# Patient Record
Sex: FEMALE | Race: WHITE | NOT HISPANIC OR LATINO | Employment: FULL TIME | URBAN - METROPOLITAN AREA
[De-identification: names, ages, dates, MRNs, and addresses within clinical notes are randomized per-mention and may not be internally consistent; named-entity substitution may affect disease eponyms.]

---

## 2017-02-12 ENCOUNTER — MYC REFILL (OUTPATIENT)
Dept: FAMILY MEDICINE | Facility: CLINIC | Age: 52
End: 2017-02-12

## 2017-02-12 ENCOUNTER — MYC REFILL (OUTPATIENT)
Dept: URGENT CARE | Facility: URGENT CARE | Age: 52
End: 2017-02-12

## 2017-02-12 DIAGNOSIS — F51.01 PRIMARY INSOMNIA: ICD-10-CM

## 2017-02-12 DIAGNOSIS — R45.89 PREDOMINANT DISTURBANCE OF EMOTIONS: ICD-10-CM

## 2017-02-13 ENCOUNTER — OFFICE VISIT (OUTPATIENT)
Dept: PODIATRY | Facility: CLINIC | Age: 52
End: 2017-02-13
Payer: COMMERCIAL

## 2017-02-13 VITALS — HEIGHT: 65 IN | WEIGHT: 154 LBS | BODY MASS INDEX: 25.66 KG/M2

## 2017-02-13 DIAGNOSIS — M72.2 PLANTAR FASCIITIS, LEFT: Primary | ICD-10-CM

## 2017-02-13 PROCEDURE — 20550 NJX 1 TENDON SHEATH/LIGAMENT: CPT | Mod: LT | Performed by: PODIATRIST

## 2017-02-13 NOTE — PATIENT INSTRUCTIONS
Initial musculoskeletal treatment recommendation:    1.  Stretch the calf muscles as instructed once an hour.  2.  Ice the injured area in the evening; 20 min on/off.  3.  Take antiinflammatory medication as directed.  4.  Massage the soft tissues around the injured area in the morning to loosen the tissue  5.  Wear supportive foot wear         If no improvement in symptoms within four to six weeks, return to clinic for reevaluation..    After the Injection     After the injection, strenuous and repetitive activity should be minimized for approximately 48 hours.   Ice should be applied to the injected area at least for the next 48 hours.   Apply ice to the injected area at least 3 - 4 times a day for 20 minutes each time for the next 48 hours. This can reduce the painful  flare  reaction that can follow an injection the next day. This reaction can cause the area that was injected to hurt more the next day just from the injection. This will resolve within a day if it does occur.     Use over-the-counter pain medications such as Tylenol to help with the pain if necessary.     After 48 hours, icing the area may be continued if you find it beneficial.     The lidocaine or marcaine (commonly called Novocain) is an anesthetic agent that is injected with the steroid will typically relieve your pain for a few hours following the injection. If the  Novocain  and steroid are injected near a nerve, you may experience local numbness or weakness from the nerve block until it wears off. After this wears off your pain may return until the steroid takes effect.   The steroid may be effective immediately after the injection. Do not be concerned if the injection is not effective in relieving your symptoms immediately. In some cases, it may take up to two weeks for the steroid to work.   If you are diabetic, the corticosteroid may cause your blood sugar to become elevated for several days following the injection. This response usually  lasts about 2-4 days before it returns to your normal level.   You should report any adverse reaction to you doctor. Call if there are any questions.

## 2017-02-13 NOTE — PROGRESS NOTES
PATIENT HISTORY:  Margot Kendrick is a 51 year old female who presents to clinic for a painful left foot .  The patient describes the pain as sharp stabbing.  The patient relates pain is located on the bottom of the left heel.  The patient relates the pain has been present for the past several weeks.  The patient relates pain with ambulation.  The patient has tried icing with little relief.       REVIEW OF SYSTEMS:  Constitutional, HEENT, cardiovascular, pulmonary, GI, , musculoskeletal, neuro, skin, endocrine and psych systems are negative, except as otherwise noted.     PAST MEDICAL HISTORY:   Past Medical History   Diagnosis Date     Arthritis      Esophageal reflux      GERD     Migraine, unspecified, without mention of intractable migraine without mention of status migrainosus      Mitral valve disorders      thought possible MVP, not documented     Myalgia and myositis, unspecified      Fibromyalgia        PAST SURGICAL HISTORY:   Past Surgical History   Procedure Laterality Date     Surgical history of -   ,,          Surgical history of -   1993     Excision (L) ganglion cyst     Surgical history of -   1993     Tubal ligation     Surgical history of -   1998     Septoplasty/sinus surgery     Surgical history of -        TVH     Surgical history of -   2004/ /15/2005     Colonoscopy     Surgical history of -   2001     Gastroscopy     Surgical history of -        Septoplasty     Surgical history of -        rt craig colectomy     Surgical history of -        Open distal clavicle resection with subacromial decompression     Surgical history of -   2008     rt wrist TFCC     Surgical history of -   2008     rt wrist removal of scar tissue     Surgical history of -   2009     Arthroscopic subacromial decompression with introduction of On-Q pain pump.      Ent surgery       Colonoscopy       Orthopedic surgery        Colonoscopy  6/11/2012     Procedure: COLONOSCOPY;  Colonoscopy;  Surgeon: Jun Reddy MD;  Location: [facility]     Hysterectomy, vaginal  2002     Abdomen surgery  2005     Right Sanjiv-Collectomy     Appendectomy  2005     With collectomy     Ent surgery  2000     Sinus     Orthopedic surgery  2006/2009/2010     Left shoulder/Right Shoulder/Right Wrist x 3     Esophagoscopy, gastroscopy, duodenoscopy (egd), combined N/A 10/8/2015     Procedure: COMBINED ESOPHAGOSCOPY, GASTROSCOPY, DUODENOSCOPY (EGD), BIOPSY SINGLE OR MULTIPLE;  Surgeon: Anson Sethi MD;  Location: [facility]     Laparoscopic cholecystectomy N/A 10/27/2015     Procedure: LAPAROSCOPIC CHOLECYSTECTOMY;  Surgeon: Anson Sethi MD;  Location: [facility]     Eye surgery  2015     Recurrent corneal erosion     Colonoscopy N/A 2/15/2016     Procedure: COLONOSCOPY;  Surgeon: Anson Sethi MD;  Location: [facility]        MEDICATIONS:   Current Outpatient Prescriptions:      temazepam (RESTORIL) 15 MG capsule, Take 1 capsule (15 mg) by mouth nightly as needed for sleep, Disp: 30 capsule, Rfl: 5     estradiol (ESTRACE VAGINAL) 0.1 MG/GM vaginal cream, Place 2 g vaginally twice a week, Disp: 42.5 g, Rfl: 3     aluminum chloride (DRYSOL) 20 % external solution, Apply topically At Bedtime To improve effect, cover area of application with plastic wrap,  hold in place with tight shirt, and wash area in morning. As sweating improves, decrease use to 1-2 times weekly., Disp: 35 mL, Rfl: 5     LORazepam (ATIVAN) 0.5 MG tablet, Take 1 tablet (0.5 mg) by mouth every 8 hours as needed for anxiety, Disp: 50 tablet, Rfl: 0     cyclobenzaprine (FLEXERIL) 10 MG tablet, Take 1 tablet (10 mg) by mouth nightly as needed for muscle spasms, Disp: 30 tablet, Rfl: 1     SUMAtriptan (IMITREX) 50 MG tablet, Take 1 tablet (50 mg) by mouth at onset of headache for migraine May repeat dose in 2 hours.  Do not exceed 200 mg in 24 hours, Disp: 18 tablet, Rfl: 3     naproxen sodium (ALEVE) 220  MG capsule, Take 220 mg by mouth 2 times daily (with meals), Disp: , Rfl:      traMADol (ULTRAM) 50 MG tablet, Take 1-2 at bedtime as needed for pain., Disp: 60 tablet, Rfl: 0     ADVIL 200 MG OR TABS, 2 tabs prn, Disp: , Rfl:      ALLERGIES:    Allergies   Allergen Reactions     Asa [Aspirin] Nausea     TINGLING  FINGERS, HEAD,         SOCIAL HISTORY:   Social History     Social History     Marital status:      Spouse name: N/A     Number of children: N/A     Years of education: N/A     Occupational History      [employer]     Social History Main Topics     Smoking status: Former Smoker     Packs/day: 0.50     Types: Cigarettes     Smokeless tobacco: Never Used      Comment: quit plan offered     Alcohol use No     Drug use: No     Sexual activity: Yes     Partners: Male     Birth control/ protection: Female Surgical      Comment: leeann 1998     Other Topics Concern     Parent/Sibling W/ Cabg, Mi Or Angioplasty Before 65f 55m? No     Social History Narrative        FAMILY HISTORY:   Family History   Problem Relation Age of Onset     C.A.D. Maternal Grandmother      Arthritis Maternal Grandmother      rheumatoid arthritis     Cardiovascular Maternal Grandfather      Cardiovascular Paternal Grandmother      Cardiovascular Paternal Grandfather      Respiratory Daughter      ASTHMA     Neurologic Disorder Father      FIBROMYALGIA     HEART DISEASE Father 68     C.A.D. Father 68     MI     Neurologic Disorder Brother      FIBROMYALGIA     Neurologic Disorder Sister      FIBROMYALGIA     Arthritis Maternal Aunt      psoriatic     Melanoma No family hx of      OSTEOPOROSIS Father      Thyroid Disease Mother      DIABETES Maternal Grandmother      DIABETES Other      Maternal Aunt     Coronary Artery Disease Maternal Grandfather      Coronary Artery Disease Other      Maternal Aunt     Hypertension Mother      Hypertension Other      Maternal Aunt     Hyperlipidemia Mother      Hyperlipidemia Other       Maternal Aunt     Anxiety Disorder Sister         EXAM:Vitals: There were no vitals taken for this visit.  BMI= There is no height or weight on file to calculate BMI.       General appearance: Patient is alert and fully cooperative with history & exam.  No sign of distress is noted during the visit.     Psychiatric: Affect is pleasant & appropriate.  Patient appears motivated to improve health.     Respiratory: Breathing is regular & unlabored while sitting.     HEENT: Hearing is intact to spoken word.  Speech is clear.  No gross evidence of visual impairment that would impact ambulation.     Dermatologic: Skin is intact to both lower extremities without significant lesions, rash or abrasion.  No paronychia or evidence of soft tissue infection is noted.     Vascular: DP & PT pulses are intact & regular bilaterally.  No significant edema or varicosities noted.  CFT and skin temperature is normal to both lower extremities.     Neurologic: Lower extremity sensation is intact to light touch.  No evidence of weakness or contracture in the lower extremities.  No evidence of neuropathy.     Musculoskeletal: Patient is ambulatory without assistive device or brace.  No gross ankle deformity noted.  No foot or ankle joint effusion is noted.  One notes pain on palpation on the plantar aspect of the left heel.  No surrounding erythema noted.       ASSESSMENT / PLAN:     ICD-10-CM    1. Plantar fasciitis, left M72.2        I have explained to Margot  about the conditions.  We discussed the nature of the condition as well as the treatment plan and expected length of recovery.  At this time, the patient will need a steroid injection.  The medial aspect of the left heel was swabbed with alcohol.  Next, a mixture of 5mg of Kenalog 10, and 2 cc of 1% lidocaine plain was injected around the medial tubercle of the calcaneal in and around the insertion of the plantar fascia to the left foot.  The patient tolerated the procedure well.  A  Band-Aid was applied to the injection site.  The patient will follow up in four weeks.        Disclaimer: This note consists of symbols derived from keyboarding, dictation and/or voice recognition software. As a result, there may be errors in the script that have gone undetected. Please consider this when interpreting information found in this chart.       SUZANNE Morris D.P.M., [email or web address].

## 2017-02-13 NOTE — MR AVS SNAPSHOT
After Visit Summary   2/13/2017    Margot Kendrick    MRN: [id number]           Patient Information     Date Of Birth          1965        Visit Information        Provider Department      2/13/2017 2:45 PM Panchito Morris DPM [facility]        Today's Diagnoses     Plantar fasciitis, left    -  1      Care Instructions    Initial musculoskeletal treatment recommendation:    1.  Stretch the calf muscles as instructed once an hour.  2.  Ice the injured area in the evening; 20 min on/off.  3.  Take antiinflammatory medication as directed.  4.  Massage the soft tissues around the injured area in the morning to loosen the tissue  5.  Wear supportive foot wear         If no improvement in symptoms within four to six weeks, return to clinic for reevaluation..    After the Injection     After the injection, strenuous and repetitive activity should be minimized for approximately 48 hours.   Ice should be applied to the injected area at least for the next 48 hours.   Apply ice to the injected area at least 3 - 4 times a day for 20 minutes each time for the next 48 hours. This can reduce the painful  flare  reaction that can follow an injection the next day. This reaction can cause the area that was injected to hurt more the next day just from the injection. This will resolve within a day if it does occur.     Use over-the-counter pain medications such as Tylenol to help with the pain if necessary.     After 48 hours, icing the area may be continued if you find it beneficial.     The lidocaine or marcaine (commonly called Novocain) is an anesthetic agent that is injected with the steroid will typically relieve your pain for a few hours following the injection. If the  Novocain  and steroid are injected near a nerve, you may experience local numbness or weakness from the nerve block until it wears off. After this wears off your pain may return until the  steroid takes effect.   The steroid may be effective immediately after the injection. Do not be concerned if the injection is not effective in relieving your symptoms immediately. In some cases, it may take up to two weeks for the steroid to work.   If you are diabetic, the corticosteroid may cause your blood sugar to become elevated for several days following the injection. This response usually lasts about 2-4 days before it returns to your normal level.   You should report any adverse reaction to you doctor. Call if there are any questions.           Follow-ups after your visit        Follow-up notes from your care team     Return in about 4 weeks (around 3/13/2017), or if symptoms worsen or fail to improve.      Who to contact     If you have questions or need follow up information about today's clinic visit or your schedule please contact [facility] directly at [phone].  Normal or non-critical lab and imaging results will be communicated to you by [employer], letter or phone within 4 business days after the clinic has received the results. If you do not hear from us within 7 days, please contact the clinic through [employer] or phone. If you have a critical or abnormal lab result, we will notify you by phone as soon as possible.  Submit refill requests through [employer] or call your pharmacy and they will forward the refill request to us. Please allow 3 business days for your refill to be completed.          Additional Information About Your Visit        [employer] Information     [employer] gives you secure access to your electronic health record. If you see a primary care provider, you can also send messages to your care team and make appointments. If you have questions, please call your primary care clinic.  If you do not have a primary care provider, please call [phone] and they will assist you.        Care EveryWhere ID     This is your Care EveryWhere ID. This could be used by  "other organizations to access your [facility] medical records  [id number]        Your Vitals Were     Height BMI (Body Mass Index)                1.651 m (5' 5\") 25.63 kg/m2           Blood Pressure from Last 3 Encounters:   12/12/16 103/70   11/28/16 128/78   07/14/16 119/67    Weight from Last 3 Encounters:   02/13/17 69.9 kg (154 lb)   12/12/16 69.9 kg (154 lb)   11/28/16 70 kg (154 lb 6.4 oz)              Today, you had the following     No orders found for display       Primary Care Provider Office Phone # Fax #    Lakeisha Haley [phone]       [facility] [address] [place] [id number]        Thank you!     Thank you for choosing [facility]  for your care. Our goal is always to provide you with excellent care. Hearing back from our patients is one way we can continue to improve our services. Please take a few minutes to complete the written survey that you may receive in the mail after your visit with us. Thank you!             Your Updated Medication List - Protect others around you: Learn how to safely use, store and throw away your medicines at www.disposemymeds.org.          This list is accurate as of: 2/13/17  3:42 PM.  Always use your most recent med list.                   Brand Name Dispense Instructions for use    ADVIL 200 MG tablet   Generic drug:  ibuprofen      2 tabs prn       ALEVE 220 MG capsule   Generic drug:  naproxen sodium      Take 220 mg by mouth 2 times daily (with meals)       aluminum chloride 20 % external solution    DRYSOL    35 mL    Apply topically At Bedtime To improve effect, cover area of application with plastic wrap,  hold in place with tight shirt, and wash area in morning. As sweating improves, decrease use to 1-2 times weekly.       cyclobenzaprine 10 MG tablet    FLEXERIL    30 tablet    Take 1 tablet (10 mg) by mouth nightly as needed for muscle spasms       estradiol 0.1 MG/GM cream    " ESTRACE VAGINAL    42.5 g    Place 2 g vaginally twice a week       LORazepam 0.5 MG tablet    ATIVAN    50 tablet    Take 1 tablet (0.5 mg) by mouth every 8 hours as needed for anxiety       SUMAtriptan 50 MG tablet    IMITREX    18 tablet    Take 1 tablet (50 mg) by mouth at onset of headache for migraine May repeat dose in 2 hours.  Do not exceed 200 mg in 24 hours       temazepam 15 MG capsule    RESTORIL    30 capsule    Take 1 capsule (15 mg) by mouth nightly as needed for sleep       traMADol 50 MG tablet    ULTRAM    60 tablet    Take 1-2 at bedtime as needed for pain.

## 2017-02-14 ENCOUNTER — MYC MEDICAL ADVICE (OUTPATIENT)
Dept: FAMILY MEDICINE | Facility: CLINIC | Age: 52
End: 2017-02-14

## 2017-02-14 NOTE — TELEPHONE ENCOUNTER
Message from [employer]:  Original authorizing provider: LIZBETH Quiñonez would like a refill of the following medications:  temazepam (RESTORIL) 15 MG capsule [Michael Katz PA-C]    Preferred pharmacy: [id number] [address] [facility]    Comment:      Medication renewals requested in this message routed to other providers:  LORazepam (ATIVAN) 0.5 MG tablet [Lakeisha Haley MD]

## 2017-02-14 NOTE — TELEPHONE ENCOUNTER
[facility]  Original authorizing provider: MD Margot Kellogg would like a refill of the following medications:  LORazepam (ATIVAN) 0.5 MG tablet [Lakeisha Haley MD]    Preferred pharmacy: [facility] #[id number] [facility] [address]    Comment:      Medication renewals requested in this message routed to other providers:  temazepam (RESTORIL) 15 MG capsule [FELIX QuiñonezC]

## 2017-02-14 NOTE — TELEPHONE ENCOUNTER
Message from [facility]:  Original authorizing provider: LIZBETH Quiñonez would like a refill of the following medications:  temazepam (RESTORIL) 15 MG capsule [Michael Katz PA-C]    Preferred pharmacy: [facility] #[id number] [address]    Comment:

## 2017-02-14 NOTE — TELEPHONE ENCOUNTER
Routing refill request to provider for review/approval because:  Drug not on the [facility] refill protocol     Thank you  Alison HEIN RN

## 2017-02-15 RX ORDER — LORAZEPAM 0.5 MG/1
0.5 TABLET ORAL EVERY 8 HOURS PRN
Qty: 50 TABLET | Refills: 0 | OUTPATIENT
Start: 2017-02-15

## 2017-02-15 RX ORDER — TEMAZEPAM 15 MG/1
15 CAPSULE ORAL
Qty: 30 CAPSULE | Refills: 5 | Status: SHIPPED | OUTPATIENT
Start: 2017-02-15 | End: 2017-09-01

## 2017-02-15 RX ORDER — TEMAZEPAM 15 MG/1
15 CAPSULE ORAL
Qty: 30 CAPSULE | Refills: 5 | OUTPATIENT
Start: 2017-02-15

## 2017-03-01 ENCOUNTER — MYC MEDICAL ADVICE (OUTPATIENT)
Dept: FAMILY MEDICINE | Facility: CLINIC | Age: 52
End: 2017-03-01

## 2017-03-01 DIAGNOSIS — R45.89 PREDOMINANT DISTURBANCE OF EMOTIONS: ICD-10-CM

## 2017-03-02 RX ORDER — LORAZEPAM 0.5 MG/1
0.5 TABLET ORAL EVERY 8 HOURS PRN
Qty: 30 TABLET | Refills: 0 | Status: SHIPPED | OUTPATIENT
Start: 2017-03-02 | End: 2017-09-01

## 2017-08-08 ENCOUNTER — OFFICE VISIT (OUTPATIENT)
Dept: FAMILY MEDICINE | Facility: CLINIC | Age: 52
End: 2017-08-08
Payer: COMMERCIAL

## 2017-08-08 VITALS
HEIGHT: 65 IN | BODY MASS INDEX: 26.16 KG/M2 | SYSTOLIC BLOOD PRESSURE: 110 MMHG | WEIGHT: 157 LBS | TEMPERATURE: 99.1 F | HEART RATE: 84 BPM | DIASTOLIC BLOOD PRESSURE: 64 MMHG

## 2017-08-08 DIAGNOSIS — J01.90 ACUTE SINUSITIS WITH SYMPTOMS > 10 DAYS: Primary | ICD-10-CM

## 2017-08-08 PROCEDURE — 99213 OFFICE O/P EST LOW 20 MIN: CPT | Performed by: NURSE PRACTITIONER

## 2017-08-08 RX ORDER — DOXYCYCLINE 100 MG/1
100 CAPSULE ORAL 2 TIMES DAILY
Qty: 14 CAPSULE | Refills: 0 | Status: SHIPPED | OUTPATIENT
Start: 2017-08-08 | End: 2017-08-15

## 2017-08-08 NOTE — PROGRESS NOTES
SUBJECTIVE:                                                    Margot Kendrick is a 51 year old female who presents to clinic today for the following health issues:      ENT Symptoms             Symptoms: cc Present Absent Comment   Fever/Chills   x    Fatigue  x     Muscle Aches   x    Eye Irritation  x  Right side    Sneezing   x    Nasal Chadd/Drg   x    Sinus Pressure/Pain x   Right side    Loss of smell   x    Dental pain  x  Right side    Sore Throat   x Poor taste in AM in throat    Swollen Glands   x    Ear Pain/Fullness  x  Right side    Cough   x    Wheeze   x    Chest Pain   x    Shortness of breath   x    Rash   x    Other  x  HA      Symptom duration:  6 days    Symptom severity:  moderate    Treatments tried:  ibu, musinex    Contacts:   has cold      Tried mucinex x 2    Problem list and histories reviewed & adjusted, as indicated.  Additional history: as documented    Patient Active Problem List   Diagnosis     Abdominal pain, generalized     Irritable bowel syndrome     Esophageal reflux     Sensorineural hearing loss     Tobacco abuse     Chiari malformation type I (H)     FAMILY HISTORY OF BLOOD DISORDER-NONSPEC     Migraine headache     Vitamin D deficiency     CARDIOVASCULAR SCREENING; LDL GOAL LESS THAN 160     Insomnia     Fibromyalgia     Past Surgical History:   Procedure Laterality Date     ABDOMEN SURGERY  2005    Right Sanjiv-Collectomy     APPENDECTOMY  2005    With collectomy     COLONOSCOPY       COLONOSCOPY  6/11/2012    Procedure: COLONOSCOPY;  Colonoscopy;  Surgeon: Jun Reddy MD;  Location: [facility]     COLONOSCOPY N/A 2/15/2016    Procedure: COLONOSCOPY;  Surgeon: Anson Sethi MD;  Location: [facility]     ENT SURGERY       ENT SURGERY  2000    Sinus     ESOPHAGOSCOPY, GASTROSCOPY, DUODENOSCOPY (EGD), COMBINED N/A 10/8/2015    Procedure: COMBINED ESOPHAGOSCOPY, GASTROSCOPY, DUODENOSCOPY (EGD), BIOPSY SINGLE OR MULTIPLE;  Surgeon: Anson Sethi MD;   Location: [facility]     EYE SURGERY  2015    Recurrent corneal erosion     HYSTERECTOMY, VAGINAL  2002     LAPAROSCOPIC CHOLECYSTECTOMY N/A 10/27/2015    Procedure: LAPAROSCOPIC CHOLECYSTECTOMY;  Surgeon: Anson Sethi MD;  Location: [facility]     ORTHOPEDIC SURGERY       ORTHOPEDIC SURGERY      Left shoulder/Right Shoulder/Right Wrist x 3     SURGICAL HISTORY OF -   ,,         SURGICAL HISTORY OF -   1993    Excision (L) ganglion cyst     SURGICAL HISTORY OF 1993    Tubal ligation     SURGICAL HISTORY OF -   1998    Septoplasty/sinus surgery     SURGICAL HISTORY OF -       TVH     SURGICAL HISTORY OF -   2004/ /15/2005    Colonoscopy     SURGICAL HISTORY OF -   2001    Gastroscopy     SURGICAL HISTORY OF -       Septoplasty     SURGICAL HISTORY OF -       rt craig colectomy     SURGICAL HISTORY OF -       Open distal clavicle resection with subacromial decompression     SURGICAL HISTORY OF -   2008    rt wrist TFCC     SURGICAL HISTORY OF -   2008    rt wrist removal of scar tissue     SURGICAL HISTORY OF -   2009    Arthroscopic subacromial decompression with introduction of On-Q pain pump.        Social History   Substance Use Topics     Smoking status: Former Smoker     Packs/day: 0.50     Types: Cigarettes     Smokeless tobacco: Never Used      Comment: quit plan offered     Alcohol use No     Family History   Problem Relation Age of Onset     C.A.D. Maternal Grandmother      Arthritis Maternal Grandmother      rheumatoid arthritis     DIABETES Maternal Grandmother      Cardiovascular Maternal Grandfather      Coronary Artery Disease Maternal Grandfather      Cardiovascular Paternal Grandmother      Cardiovascular Paternal Grandfather      Respiratory Daughter      ASTHMA     Neurologic Disorder Father      FIBROMYALGIA     HEART DISEASE Father 68     C.A.D. Father 68     MI     OSTEOPOROSIS Father      Neurologic  Disorder Brother      FIBROMYALGIA     Neurologic Disorder Sister      FIBROMYALGIA     Anxiety Disorder Sister      Thyroid Disease Mother      Hypertension Mother      Hyperlipidemia Mother      Arthritis Maternal Aunt      psoriatic     DIABETES Other      Maternal Aunt     Coronary Artery Disease Other      Maternal Aunt     Hypertension Other      Maternal Aunt     Hyperlipidemia Other      Maternal Aunt     Melanoma No family hx of          Current Outpatient Prescriptions   Medication Sig Dispense Refill     doxycycline Monohydrate 100 MG CAPS Take 1 capsule (100 mg) by mouth 2 times daily for 7 days 14 capsule 0     LORazepam (ATIVAN) 0.5 MG tablet Take 1 tablet (0.5 mg) by mouth every 8 hours as needed for anxiety Or panic 30 tablet 0     temazepam (RESTORIL) 15 MG capsule Take 1 capsule (15 mg) by mouth nightly as needed for sleep 30 capsule 5     estradiol (ESTRACE VAGINAL) 0.1 MG/GM vaginal cream Place 2 g vaginally twice a week (Patient taking differently: Place 2 g vaginally twice a week Pt doing this once weekly) 42.5 g 3     aluminum chloride (DRYSOL) 20 % external solution Apply topically At Bedtime To improve effect, cover area of application with plastic wrap,  hold in place with tight shirt, and wash area in morning. As sweating improves, decrease use to 1-2 times weekly. 35 mL 5     cyclobenzaprine (FLEXERIL) 10 MG tablet Take 1 tablet (10 mg) by mouth nightly as needed for muscle spasms 30 tablet 1     SUMAtriptan (IMITREX) 50 MG tablet Take 1 tablet (50 mg) by mouth at onset of headache for migraine May repeat dose in 2 hours.  Do not exceed 200 mg in 24 hours 18 tablet 3     naproxen sodium (ALEVE) 220 MG capsule Take 220 mg by mouth 2 times daily (with meals)       traMADol (ULTRAM) 50 MG tablet Take 1-2 at bedtime as needed for pain. 60 tablet 0     ADVIL 200 MG OR TABS 2 tabs prn       Allergies   Allergen Reactions     Asa [Aspirin] Nausea     TINGLING  FINGERS, HEAD,      Labs reviewed  "in EPIC      Reviewed and updated as needed this visit by clinical staff     Reviewed and updated as needed this visit by Provider       ROS:  Constitutional, HEENT, cardiovascular, pulmonary, gi and gu systems are negative, except as otherwise noted.      OBJECTIVE:   /64 (Cuff Size: Adult Regular)  Pulse 84  Temp 99.1  F (37.3  C) (Tympanic)  Ht 5' 5\" (1.651 m)  Wt 157 lb (71.2 kg)  BMI 26.13 kg/m2  Body mass index is 26.13 kg/(m^2).  GENERAL: healthy, alert and no distress  HENT: normal cephalic/atraumatic, both ears: clear effusion, nose and mouth without ulcers or lesions, nasal mucosa edematous , oropharynx clear, oral mucous membranes moist and sinuses: maxillary tenderness on right  NECK: no adenopathy  RESP: lungs clear to auscultation - no rales, rhonchi or wheezes  CV: regular rate and rhythm, normal S1 S2, no S3 or S4, no murmur, click or rub  ABDOMEN: soft, nontender, and bowel sounds normal  PSYCH: mentation appears normal, affect normal/bright    Diagnostic Test Results:  none     ASSESSMENT/PLAN:     1. Acute sinusitis with symptoms > 10 days  Try symptomatic care, if symptoms not improving in the next 3-5 days start the antibiotics.  - doxycycline Monohydrate 100 MG CAPS; Take 1 capsule (100 mg) by mouth 2 times daily for 7 days  Dispense: 14 capsule; Refill: 0    Home care instructions were reviewed with the patient. The risks, benefits and treatment options of prescribed medications or other treatments have been discussed with the patient. The patient verbalized their understanding and should call or follow up if no improvement or if they develop further problems.    Patient Instructions   Try the Sudafed for sinus symptoms    Start the doxycycline if symptoms not improving in the next 3-5 days    Follow up if symptoms do not improve or worsen.    Sinusitis (No Antibiotics)    The sinuses are air-filled spaces within the bones of the face. They connect to the inside of the " nose. Sinusitis is an inflammation of the tissue lining the sinus cavity. Sinus inflammation can occur during a cold. It can also be due to allergies to pollens and other particles in the air. It can cause symptoms such as sinus congestion, headache, sore throat, facial swelling and fullness. It may also cause a low-grade fever. No infection is present, and no antibiotic treatment is needed.  Home care    Drink plenty of water, hot tea, and other liquids. This may help thin mucus. It also may promote sinus drainage.    Heat may help soothe painful areas of the face. Use a towel soaked in hot water. Or,  the shower and direct the hot spray onto your face. Using a vaporizer along with a menthol rub at night may also help.     An expectorant containing guaifenesin may help thin the mucus and promote drainage from the sinuses.    Over-the-counter decongestants may be used unless a similar medicine was prescribed. Nasal sprays work the fastest. Use one that contains phenylephrine or oxymetazoline. First blow the nose gently. Then use the spray. Do not use these medicines more often than directed on the label or symptoms may get worse. You may also use tablets containing pseudoephedrine. Avoid products that combine ingredients, because side effects may be increased. Read labels. You can also ask the pharmacist for help. (NOTE: Persons with high blood pressure should not use decongestants. They can raise blood pressure.)    Over-the-counter antihistamines may help if allergies contributed to your sinusitis.      Use acetaminophen or ibuprofen to control pain, unless another pain medicine was prescribed. (If you have chronic liver or kidney disease or ever had a stomach ulcer, talk with your doctor before using these medicines. Aspirin should never be used in anyone under 18 years of age who is ill with a fever. It may cause severe liver damage.)    Use nasal rinses or irrigation as instructed by your health care  provider.    Don't smoke. This can worsen symptoms.  Follow-up care  Follow up with your healthcare provider or our staff if you are not improving within the next week.  When to seek medical advice  Call your healthcare provider if any of these occur:    Green or yellow discharge from the nose or into the throat    Facial pain or headache becoming more severe    Stiff neck    Unusual drowsiness or confusion    Swelling of the forehead or eyelids    Vision problems, including blurred or double vision    Fever of 100.4 F (38 C) or higher, or as directed by your healthcare provider    Seizure    Breathing problems    Symptoms not resolving within 10 days  Date Last Reviewed: 4/13/2015 2000-2017 [employer]. [address] [place] [address], [place] [address]. All rights reserved. This information is not intended as a substitute for professional medical care. Always follow your healthcare professional's instructions.            CONSTANTIN Self [facility]

## 2017-08-08 NOTE — MR AVS SNAPSHOT
After Visit Summary   8/8/2017    Margot Kendrick    MRN: [id number]           Patient Information     Date Of Birth          1965        Visit Information        Provider Department      8/8/2017 2:00 PM Acacia Salgado APRN [facility]        Today's Diagnoses     Acute sinusitis with symptoms > 10 days    -  1      Care Instructions    Try the Sudafed for sinus symptoms    Start the doxycycline if symptoms not improving in the next 3-5 days    Follow up if symptoms do not improve or worsen.    Sinusitis (No Antibiotics)    The sinuses are air-filled spaces within the bones of the face. They connect to the inside of the nose. Sinusitis is an inflammation of the tissue lining the sinus cavity. Sinus inflammation can occur during a cold. It can also be due to allergies to pollens and other particles in the air. It can cause symptoms such as sinus congestion, headache, sore throat, facial swelling and fullness. It may also cause a low-grade fever. No infection is present, and no antibiotic treatment is needed.  Home care    Drink plenty of water, hot tea, and other liquids. This may help thin mucus. It also may promote sinus drainage.    Heat may help soothe painful areas of the face. Use a towel soaked in hot water. Or,  the shower and direct the hot spray onto your face. Using a vaporizer along with a menthol rub at night may also help.     An expectorant containing guaifenesin may help thin the mucus and promote drainage from the sinuses.    Over-the-counter decongestants may be used unless a similar medicine was prescribed. Nasal sprays work the fastest. Use one that contains phenylephrine or oxymetazoline. First blow the nose gently. Then use the spray. Do not use these medicines more often than directed on the label or symptoms may get worse. You may also use tablets containing pseudoephedrine. Avoid products that combine ingredients, because  side effects may be increased. Read labels. You can also ask the pharmacist for help. (NOTE: Persons with high blood pressure should not use decongestants. They can raise blood pressure.)    Over-the-counter antihistamines may help if allergies contributed to your sinusitis.      Use acetaminophen or ibuprofen to control pain, unless another pain medicine was prescribed. (If you have chronic liver or kidney disease or ever had a stomach ulcer, talk with your doctor before using these medicines. Aspirin should never be used in anyone under 18 years of age who is ill with a fever. It may cause severe liver damage.)    Use nasal rinses or irrigation as instructed by your health care provider.    Don't smoke. This can worsen symptoms.  Follow-up care  Follow up with your healthcare provider or our staff if you are not improving within the next week.  When to seek medical advice  Call your healthcare provider if any of these occur:    Green or yellow discharge from the nose or into the throat    Facial pain or headache becoming more severe    Stiff neck    Unusual drowsiness or confusion    Swelling of the forehead or eyelids    Vision problems, including blurred or double vision    Fever of 100.4 F (38 C) or higher, or as directed by your healthcare provider    Seizure    Breathing problems    Symptoms not resolving within 10 days  Date Last Reviewed: 4/13/2015 2000-2017 [employer]. [address] [place] [address], [place] [address]. All rights reserved. This information is not intended as a substitute for professional medical care. Always follow your healthcare professional's instructions.                Follow-ups after your visit        Who to contact     If you have questions or need follow up information about today's clinic visit or your schedule please contact [facility] directly at [phone].  Normal or non-critical lab and imaging results will be communicated to you by Fish  "letter or phone within 4 business days after the clinic has received the results. If you do not hear from us within 7 days, please contact the clinic through [employer] or phone. If you have a critical or abnormal lab result, we will notify you by phone as soon as possible.  Submit refill requests through [employer] or call your pharmacy and they will forward the refill request to us. Please allow 3 business days for your refill to be completed.          Additional Information About Your Visit        [employer] Information     [employer] gives you secure access to your electronic health record. If you see a primary care provider, you can also send messages to your care team and make appointments. If you have questions, please call your primary care clinic.  If you do not have a primary care provider, please call [phone] and they will assist you.        Care EveryWhere ID     This is your Care EveryWhere ID. This could be used by other organizations to access your [facility] medical records  [id number]        Your Vitals Were     Pulse Temperature Height BMI (Body Mass Index)          84 99.1  F (37.3  C) (Tympanic) 5' 5\" (1.651 m) 26.13 kg/m2         Blood Pressure from Last 3 Encounters:   08/08/17 110/64   12/12/16 103/70   11/28/16 128/78    Weight from Last 3 Encounters:   08/08/17 157 lb (71.2 kg)   02/13/17 154 lb (69.9 kg)   12/12/16 154 lb (69.9 kg)              Today, you had the following     No orders found for display         Today's Medication Changes          These changes are accurate as of: 8/8/17  2:24 PM.  If you have any questions, ask your nurse or doctor.               Start taking these medicines.        Dose/Directions    doxycycline Monohydrate 100 MG Caps   Used for:  Acute sinusitis with symptoms > 10 days   Started by:  Acacia Salgado APRN CNP        Dose:  100 mg   Take 1 capsule (100 mg) by mouth 2 times daily for 7 days   Quantity:  14 capsule   Refills:  0         These medicines have " changed or have updated prescriptions.        Dose/Directions    estradiol 0.1 MG/GM cream   Commonly known as:  ESTRACE VAGINAL   This may have changed:  additional instructions   Used for:  Vaginal atrophy, Urethral hypermobility        Dose:  2 g   Place 2 g vaginally twice a week   Quantity:  42.5 g   Refills:  3            Where to get your medicines      Some of these will need a paper prescription and others can be bought over the counter.  Ask your nurse if you have questions.     Bring a paper prescription for each of these medications     doxycycline Monohydrate 100 MG Caps                Primary Care Provider Office Phone # Fax #    Lakeisha Haley [phone]       [facility] [id number] [facility] [address]        Equal Access to Services     MARIA LUISA MACIAS : Tahmina lo Soyasmeen, waaxda briaadaha, qaybta kaalmada siomara, bboby díaz. So [facility] [phone].    ATENCIÓN: Si habla español, tiene a middleton disposición servicios gratuitos de asistencia lingüística. Llame al [phone].    We comply with applicable federal civil rights laws and [place] laws. We do not discriminate on the basis of race, color, national origin, age, disability sex, sexual orientation or gender identity.            Thank you!     Thank you for choosing [facility]  for your care. Our goal is always to provide you with excellent care. Hearing back from our patients is one way we can continue to improve our services. Please take a few minutes to complete the written survey that you may receive in the mail after your visit with us. Thank you!             Your Updated Medication List - Protect others around you: Learn how to safely use, store and throw away your medicines at www.disposemymeds.org.          This list is accurate as of: 8/8/17  2:24 PM.  Always use your most recent med list.                   Brand Name Dispense  Instructions for use Diagnosis    ADVIL 200 MG tablet   Generic drug:  ibuprofen      2 tabs prn        ALEVE 220 MG capsule   Generic drug:  naproxen sodium      Take 220 mg by mouth 2 times daily (with meals)        aluminum chloride 20 % external solution    DRYSOL    35 mL    Apply topically At Bedtime To improve effect, cover area of application with plastic wrap,  hold in place with tight shirt, and wash area in morning. As sweating improves, decrease use to 1-2 times weekly.    Hyperhydrosis disorder       cyclobenzaprine 10 MG tablet    FLEXERIL    30 tablet    Take 1 tablet (10 mg) by mouth nightly as needed for muscle spasms    Episodic tension-type headache, not intractable       doxycycline Monohydrate 100 MG Caps     14 capsule    Take 1 capsule (100 mg) by mouth 2 times daily for 7 days    Acute sinusitis with symptoms > 10 days       estradiol 0.1 MG/GM cream    ESTRACE VAGINAL    42.5 g    Place 2 g vaginally twice a week    Vaginal atrophy, Urethral hypermobility       LORazepam 0.5 MG tablet    ATIVAN    30 tablet    Take 1 tablet (0.5 mg) by mouth every 8 hours as needed for anxiety Or panic    Predominant disturbance of emotions       SUMAtriptan 50 MG tablet    IMITREX    18 tablet    Take 1 tablet (50 mg) by mouth at onset of headache for migraine May repeat dose in 2 hours.  Do not exceed 200 mg in 24 hours    Intractable migraine with aura without status migrainosus       temazepam 15 MG capsule    RESTORIL    30 capsule    Take 1 capsule (15 mg) by mouth nightly as needed for sleep    Primary insomnia       traMADol 50 MG tablet    ULTRAM    60 tablet    Take 1-2 at bedtime as needed for pain.    Myalgia and myositis, unspecified

## 2017-08-08 NOTE — PATIENT INSTRUCTIONS
Try the Sudafed for sinus symptoms    Start the doxycycline if symptoms not improving in the next 3-5 days    Follow up if symptoms do not improve or worsen.    Sinusitis (No Antibiotics)    The sinuses are air-filled spaces within the bones of the face. They connect to the inside of the nose. Sinusitis is an inflammation of the tissue lining the sinus cavity. Sinus inflammation can occur during a cold. It can also be due to allergies to pollens and other particles in the air. It can cause symptoms such as sinus congestion, headache, sore throat, facial swelling and fullness. It may also cause a low-grade fever. No infection is present, and no antibiotic treatment is needed.  Home care    Drink plenty of water, hot tea, and other liquids. This may help thin mucus. It also may promote sinus drainage.    Heat may help soothe painful areas of the face. Use a towel soaked in hot water. Or,  the shower and direct the hot spray onto your face. Using a vaporizer along with a menthol rub at night may also help.     An expectorant containing guaifenesin may help thin the mucus and promote drainage from the sinuses.    Over-the-counter decongestants may be used unless a similar medicine was prescribed. Nasal sprays work the fastest. Use one that contains phenylephrine or oxymetazoline. First blow the nose gently. Then use the spray. Do not use these medicines more often than directed on the label or symptoms may get worse. You may also use tablets containing pseudoephedrine. Avoid products that combine ingredients, because side effects may be increased. Read labels. You can also ask the pharmacist for help. (NOTE: Persons with high blood pressure should not use decongestants. They can raise blood pressure.)    Over-the-counter antihistamines may help if allergies contributed to your sinusitis.      Use acetaminophen or ibuprofen to control pain, unless another pain medicine was prescribed. (If you have chronic liver  or kidney disease or ever had a stomach ulcer, talk with your doctor before using these medicines. Aspirin should never be used in anyone under 18 years of age who is ill with a fever. It may cause severe liver damage.)    Use nasal rinses or irrigation as instructed by your health care provider.    Don't smoke. This can worsen symptoms.  Follow-up care  Follow up with your healthcare provider or our staff if you are not improving within the next week.  When to seek medical advice  Call your healthcare provider if any of these occur:    Green or yellow discharge from the nose or into the throat    Facial pain or headache becoming more severe    Stiff neck    Unusual drowsiness or confusion    Swelling of the forehead or eyelids    Vision problems, including blurred or double vision    Fever of 100.4 F (38 C) or higher, or as directed by your healthcare provider    Seizure    Breathing problems    Symptoms not resolving within 10 days  Date Last Reviewed: 4/13/2015 2000-2017 [employer]. [address] [place] [address], [place] [address]. All rights reserved. This information is not intended as a substitute for professional medical care. Always follow your healthcare professional's instructions.

## 2017-09-01 ENCOUNTER — MYC REFILL (OUTPATIENT)
Dept: FAMILY MEDICINE | Facility: CLINIC | Age: 52
End: 2017-09-01

## 2017-09-01 DIAGNOSIS — F51.01 PRIMARY INSOMNIA: ICD-10-CM

## 2017-09-01 DIAGNOSIS — R45.89 PREDOMINANT DISTURBANCE OF EMOTIONS: ICD-10-CM

## 2017-09-01 RX ORDER — TEMAZEPAM 15 MG/1
15 CAPSULE ORAL
Qty: 30 CAPSULE | Refills: 5 | Status: SHIPPED | OUTPATIENT
Start: 2017-09-01 | End: 2018-03-01

## 2017-09-01 RX ORDER — LORAZEPAM 0.5 MG/1
0.5 TABLET ORAL DAILY
Qty: 30 TABLET | Refills: 0 | COMMUNITY
Start: 2017-09-01 | End: 2018-03-01

## 2017-09-01 RX ORDER — LORAZEPAM 0.5 MG/1
0.5 TABLET ORAL DAILY
Qty: 30 TABLET | Refills: 0 | Status: SHIPPED | OUTPATIENT
Start: 2017-09-01 | End: 2017-09-01

## 2017-09-01 RX ORDER — TEMAZEPAM 15 MG/1
15 CAPSULE ORAL
Qty: 30 CAPSULE | Refills: 5 | Status: CANCELLED | OUTPATIENT
Start: 2017-09-01

## 2017-09-01 NOTE — TELEPHONE ENCOUNTER
Pt called, states she accidentally placed refill for lorazepam instead of Temazepam. States she doesn't needs Lorazepam. Refill cancelled at pharmacy. Pt would like

## 2017-09-01 NOTE — TELEPHONE ENCOUNTER
LORazepam (ATIVAN) 0.5 MG tablet      Last Written Prescription Date:  3/2/2017  Last Fill Quantity: 30,   # refills: 0  Last Office Visit with [facility], [facility] or [facility] prescribing provider: 8/8/2017  Future Office visit:       Routing refill request to provider for review/approval because:  Drug not on the [facility], [facility] or [facility] refill protocol or controlled substance

## 2017-09-01 NOTE — TELEPHONE ENCOUNTER
Message from Avere Systemshart:  Original authorizing provider: MD Margot Kellogg would like a refill of the following medications:  temazepam (RESTORIL) 15 MG capsule [Lakeisha Haley MD]    Preferred pharmacy: [id number] [place] [id number] [place]    Comment:  I submitted for the wrong prescription yesterday. Whoops - Sorry! I do not need the Lorazepam, but I do need the Temazepam. Thanks!

## 2017-09-06 ENCOUNTER — MYC MEDICAL ADVICE (OUTPATIENT)
Dept: FAMILY MEDICINE | Facility: CLINIC | Age: 52
End: 2017-09-06

## 2017-09-07 ENCOUNTER — APPOINTMENT (OUTPATIENT)
Dept: GENERAL RADIOLOGY | Facility: CLINIC | Age: 52
End: 2017-09-07
Attending: FAMILY MEDICINE
Payer: COMMERCIAL

## 2017-09-07 ENCOUNTER — HOSPITAL ENCOUNTER (EMERGENCY)
Facility: CLINIC | Age: 52
Discharge: HOME | End: 2017-09-07
Attending: FAMILY MEDICINE | Admitting: FAMILY MEDICINE
Payer: COMMERCIAL

## 2017-09-07 VITALS
RESPIRATION RATE: 14 BRPM | TEMPERATURE: 98 F | SYSTOLIC BLOOD PRESSURE: 113 MMHG | DIASTOLIC BLOOD PRESSURE: 54 MMHG | OXYGEN SATURATION: 97 %

## 2017-09-07 DIAGNOSIS — S29.019A THORACIC MYOFASCIAL STRAIN, INITIAL ENCOUNTER: ICD-10-CM

## 2017-09-07 PROCEDURE — 99284 EMERGENCY DEPT VISIT MOD MDM: CPT | Performed by: FAMILY MEDICINE

## 2017-09-07 PROCEDURE — 99283 EMERGENCY DEPT VISIT LOW MDM: CPT

## 2017-09-07 PROCEDURE — 72072 X-RAY EXAM THORAC SPINE 3VWS: CPT
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
## 2017-09-07 NOTE — ED AVS SNAPSHOT
[facility] Emergency Department    [address] [address]    Phone:  [phone]    Fax:  [phone]                                       Margot Kendrick   MRN: [id number]    Department:  [facility] Emergency Department   Date of Visit:  9/7/2017           After Visit Summary Signature Page     I have received my discharge instructions, and my questions have been answered. I have discussed any challenges I see with this plan with the nurse or doctor.    ..........................................................................................................................................  Patient/Patient Representative Signature      ..........................................................................................................................................  Patient Representative Print Name and Relationship to Patient    ..................................................               ................................................  Date                                            Time    ..........................................................................................................................................  Reviewed by Signature/Title    ...................................................              ..............................................  Date                                                            Time
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
## 2017-09-07 NOTE — ED AVS SNAPSHOT
[facility] Emergency Department    [address] [address]    Phone:  [phone]    Fax:  [phone]                                       Margot Kendrick   MRN: [id number]    Department:  [facility] Emergency Department   Date of Visit:  9/7/2017           Patient Information     Date Of Birth          1965        Your diagnoses for this visit were:     Thoracic myofascial strain, initial encounter        You were seen by Amador Carter MD.        Discharge Instructions       Return to the Emergency Room if the following occurs:     Worsened pain, fever >101, worsened breathing, vomiting/dehydration, or for any concern at anytime.    Or, follow-up with the following provider as we discussed:     Return to your primary doctor as needed, or if not improved over the next 7 days.    Medications discussed:    Ibuprofen 600 mg every six hours for pain (7 days duration).  Tylenol 1000 mg every six hours for pain (7 days duration).  Therefore, you can alternate these every three hours and do it safely.    If you received pain-relieving or sedating medication during your time in the ER, avoid alcohol, driving automobiles, or working with machinery.  Also, a responsible adult must stay with you.      If you had X-rays or labs done we will attempt to contact you if there is a change needed in your care.      Call the Nurse Advice Line at [phone] or [phone] for any concern at anytime.      24 Hour Appointment Hotline       To make an appointment at any [facility], call [phone] ([phone]). If you don't have a family doctor or clinic, we will help you find one. [facility] clinics are conveniently located to serve the needs of you and your family.             Review of your medicines      Our records show that you are taking the medicines listed below. If these are incorrect, please call your family doctor or clinic.        Dose / Directions Last  dose taken    ADVIL 200 MG tablet   Generic drug:  ibuprofen        2 tablets by mouth as needed   Refills:  0        ALEVE 220 MG capsule   Dose:  220 mg   Generic drug:  naproxen sodium        Take 220 mg by mouth 2 times daily (with meals)   Refills:  0        aluminum chloride 20 % external solution   Commonly known as:  DRYSOL   Quantity:  35 mL        Apply topically At Bedtime To improve effect, cover area of application with plastic wrap,  hold in place with tight shirt, and wash area in morning. As sweating improves, decrease use to 1-2 times weekly.   Refills:  5        cyclobenzaprine 10 MG tablet   Commonly known as:  FLEXERIL   Dose:  10 mg   Quantity:  30 tablet        Take 1 tablet (10 mg) by mouth nightly as needed for muscle spasms   Refills:  1        LORazepam 0.5 MG tablet   Commonly known as:  ATIVAN   Dose:  0.5 mg   Quantity:  30 tablet        Take 1 tablet (0.5 mg) by mouth daily For panic or for flying THIS SCRIPT CANCELLED PT DID NOT NEED   Refills:  0        MOBIC PO   Dose:  15 mg        Take 15 mg by mouth every 3 days   Refills:  0        SUMAtriptan 50 MG tablet   Commonly known as:  IMITREX   Dose:  50 mg   Quantity:  18 tablet        Take 1 tablet (50 mg) by mouth at onset of headache for migraine May repeat dose in 2 hours.  Do not exceed 200 mg in 24 hours   Refills:  3        temazepam 15 MG capsule   Commonly known as:  RESTORIL   Dose:  15 mg   Quantity:  30 capsule        Take 1 capsule (15 mg) by mouth nightly as needed for sleep   Refills:  5        traMADol 50 MG tablet   Commonly known as:  ULTRAM   Quantity:  60 tablet        Take 1-2 at bedtime as needed for pain.   Refills:  0                Procedures and tests performed during your visit     XR Thoracic Spine 3 Views      Orders Needing Specimen Collection     None      Pending Results     Date and Time Order Name Status Description    9/7/2017 1858 XR Thoracic Spine 3 Views Preliminary             Pending Culture  Results     No orders found from 9/5/2017 to 9/8/2017.            Pending Results Instructions     If you had any lab results that were not finalized at the time of your Discharge, you can call the ED Lab Result RN at [phone]. You will be contacted by this team for any positive Lab results or changes in treatment. The nurses are available 7 days a week from 10A to 6:30P.  You can leave a message 24 hours per day and they will return your call.        Test Results From Your Hospital Stay        9/7/2017  8:41 PM      Narrative     THORACIC SPINE THREE VIEWS  9/7/2017 8:35 PM     COMPARISON: None    HISTORY: Pain        Impression     IMPRESSION: There is normal alignment of the thoracic vertebrae.  Vertebral body heights of the thoracic spine are normal. There is  degenerative endplate spurring at the T7-T8 and T8-T9 levels. There is  no evidence for fracture of the thoracic spine.                Thank you for choosing [facility]       Thank you for choosing [facility] for your care. Our goal is always to provide you with excellent care. Hearing back from our patients is one way we can continue to improve our services. Please take a few minutes to complete the written survey that you may receive in the mail after you visit with us. Thank you!        [facility] Information     [employer] gives you secure access to your electronic health record. If you see a primary care provider, you can also send messages to your care team and make appointments. If you have questions, please call your primary care clinic.  If you do not have a primary care provider, please call [phone] and they will assist you.        Care EveryWhere ID     This is your Care EveryWhere ID. This could be used by other organizations to access your [facility] medical records  [id number]        Equal Access to Services     MARIA LUISA MACIAS : Tahmina Young, jamaal skelton, bobby rodriguez.  So [facility] [phone].    ATENCIÓN: Si habla español, tiene a middleton disposición servicios gratuitos de asistencia lingüística. Llame al [phone].    We comply with applicable federal civil rights laws and [place] laws. We do not discriminate on the basis of race, color, national origin, age, disability sex, sexual orientation or gender identity.            After Visit Summary       This is your record. Keep this with you and show to your community pharmacist(s) and doctor(s) at your next visit.

## 2017-09-08 NOTE — DISCHARGE INSTRUCTIONS
Return to the Emergency Room if the following occurs:     Worsened pain, fever >101, worsened breathing, vomiting/dehydration, or for any concern at anytime.    Or, follow-up with the following provider as we discussed:     Return to your primary doctor as needed, or if not improved over the next 7 days.    Medications discussed:    Ibuprofen 600 mg every six hours for pain (7 days duration).  Tylenol 1000 mg every six hours for pain (7 days duration).  Therefore, you can alternate these every three hours and do it safely.    If you received pain-relieving or sedating medication during your time in the ER, avoid alcohol, driving automobiles, or working with machinery.  Also, a responsible adult must stay with you.      If you had X-rays or labs done we will attempt to contact you if there is a change needed in your care.      Call the Nurse Advice Line at [phone] or [phone] for any concern at anytime.

## 2017-09-13 ENCOUNTER — MYC MEDICAL ADVICE (OUTPATIENT)
Dept: FAMILY MEDICINE | Facility: CLINIC | Age: 52
End: 2017-09-13

## 2017-09-13 DIAGNOSIS — M79.7 FIBROMYALGIA: Primary | ICD-10-CM

## 2017-09-15 RX ORDER — MELOXICAM 7.5 MG/1
7.5 TABLET ORAL DAILY
Qty: 30 TABLET | Refills: 5 | Status: SHIPPED | OUTPATIENT
Start: 2017-09-15 | End: 2018-04-06

## 2018-03-01 ENCOUNTER — MYC REFILL (OUTPATIENT)
Dept: FAMILY MEDICINE | Facility: CLINIC | Age: 53
End: 2018-03-01

## 2018-03-01 ENCOUNTER — TRANSFERRED RECORDS (OUTPATIENT)
Dept: HEALTH INFORMATION MANAGEMENT | Facility: CLINIC | Age: 53
End: 2018-03-01

## 2018-03-01 DIAGNOSIS — F51.01 PRIMARY INSOMNIA: ICD-10-CM

## 2018-03-01 DIAGNOSIS — R45.89 PREDOMINANT DISTURBANCE OF EMOTIONS: ICD-10-CM

## 2018-03-02 RX ORDER — LORAZEPAM 0.5 MG/1
0.5 TABLET ORAL DAILY
Qty: 30 TABLET | Refills: 0 | Status: SHIPPED | OUTPATIENT
Start: 2018-03-02 | End: 2018-04-06

## 2018-03-02 RX ORDER — TEMAZEPAM 15 MG/1
15 CAPSULE ORAL
Qty: 30 CAPSULE | Refills: 0 | Status: SHIPPED | OUTPATIENT
Start: 2018-03-02 | End: 2018-04-06

## 2018-03-02 NOTE — TELEPHONE ENCOUNTER
Message from Fish:  Shreya Freire RN Fri Mar 2, 2018 8:13 AM        ----- Message -----   From: Margot Kendrick   Sent: 3/1/2018 3:26 PM   To: Kellee Haley Care Team  Subject: Medication Renewal Request     Original authorizing provider: MD Margot Kellogg would like a refill of the following medications:  temazepam (RESTORIL) 15 MG capsule [Lakeisha Haley MD]  LORazepam (ATIVAN) 0.5 MG tablet [Lakeisha Haley MD]    Preferred pharmacy: [id number] [address]    Comment:  I will need both temazepam and the ativan refilled this time around. I only get the Ativan once a year in the beginning of the year. Thanks! Margot [place]

## 2018-03-06 ENCOUNTER — TRANSFERRED RECORDS (OUTPATIENT)
Dept: HEALTH INFORMATION MANAGEMENT | Facility: CLINIC | Age: 53
End: 2018-03-06

## 2018-04-06 ENCOUNTER — OFFICE VISIT (OUTPATIENT)
Dept: FAMILY MEDICINE | Facility: CLINIC | Age: 53
End: 2018-04-06
Payer: COMMERCIAL

## 2018-04-06 VITALS
DIASTOLIC BLOOD PRESSURE: 70 MMHG | BODY MASS INDEX: 26.82 KG/M2 | SYSTOLIC BLOOD PRESSURE: 114 MMHG | HEART RATE: 72 BPM | TEMPERATURE: 99.1 F | HEIGHT: 65 IN | WEIGHT: 161 LBS

## 2018-04-06 DIAGNOSIS — G44.219 EPISODIC TENSION-TYPE HEADACHE, NOT INTRACTABLE: Primary | ICD-10-CM

## 2018-04-06 DIAGNOSIS — M79.7 FIBROMYALGIA: ICD-10-CM

## 2018-04-06 DIAGNOSIS — F51.01 PRIMARY INSOMNIA: ICD-10-CM

## 2018-04-06 DIAGNOSIS — M79.89 RIGHT LEG SWELLING: ICD-10-CM

## 2018-04-06 DIAGNOSIS — G43.119 INTRACTABLE MIGRAINE WITH AURA WITHOUT STATUS MIGRAINOSUS: ICD-10-CM

## 2018-04-06 LAB
ALBUMIN SERPL-MCNC: 4.1 G/DL (ref 3.4–5)
ALP SERPL-CCNC: 42 U/L (ref 40–150)
ALT SERPL W P-5'-P-CCNC: 25 U/L (ref 0–50)
ANION GAP SERPL CALCULATED.3IONS-SCNC: 5 MMOL/L (ref 3–14)
AST SERPL W P-5'-P-CCNC: 18 U/L (ref 0–45)
BASOPHILS # BLD AUTO: 0 10E9/L (ref 0–0.2)
BASOPHILS NFR BLD AUTO: 0.6 %
BILIRUB DIRECT SERPL-MCNC: 0.1 MG/DL (ref 0–0.2)
BILIRUB SERPL-MCNC: 0.9 MG/DL (ref 0.2–1.3)
BUN SERPL-MCNC: 19 MG/DL (ref 7–30)
CALCIUM SERPL-MCNC: 9.3 MG/DL (ref 8.5–10.1)
CHLORIDE SERPL-SCNC: 104 MMOL/L (ref 94–109)
CO2 SERPL-SCNC: 27 MMOL/L (ref 20–32)
CREAT SERPL-MCNC: 0.76 MG/DL (ref 0.52–1.04)
DIFFERENTIAL METHOD BLD: NORMAL
EOSINOPHIL # BLD AUTO: 0.1 10E9/L (ref 0–0.7)
EOSINOPHIL NFR BLD AUTO: 1.5 %
ERYTHROCYTE [DISTWIDTH] IN BLOOD BY AUTOMATED COUNT: 12.7 % (ref 10–15)
GFR SERPL CREATININE-BSD FRML MDRD: 81 ML/MIN/1.7M2
GLUCOSE SERPL-MCNC: 89 MG/DL (ref 70–99)
HCT VFR BLD AUTO: 42.1 % (ref 35–47)
HGB BLD-MCNC: 13.8 G/DL (ref 11.7–15.7)
LYMPHOCYTES # BLD AUTO: 1.7 10E9/L (ref 0.8–5.3)
LYMPHOCYTES NFR BLD AUTO: 24 %
MCH RBC QN AUTO: 30.4 PG (ref 26.5–33)
MCHC RBC AUTO-ENTMCNC: 32.8 G/DL (ref 31.5–36.5)
MCV RBC AUTO: 93 FL (ref 78–100)
MONOCYTES # BLD AUTO: 0.7 10E9/L (ref 0–1.3)
MONOCYTES NFR BLD AUTO: 9.2 %
NEUTROPHILS # BLD AUTO: 4.6 10E9/L (ref 1.6–8.3)
NEUTROPHILS NFR BLD AUTO: 64.7 %
PLATELET # BLD AUTO: 254 10E9/L (ref 150–450)
POTASSIUM SERPL-SCNC: 4.2 MMOL/L (ref 3.4–5.3)
PROT SERPL-MCNC: 7.6 G/DL (ref 6.8–8.8)
RBC # BLD AUTO: 4.54 10E12/L (ref 3.8–5.2)
SODIUM SERPL-SCNC: 136 MMOL/L (ref 133–144)
TSH SERPL DL<=0.005 MIU/L-ACNC: 1 MU/L (ref 0.4–4)
WBC # BLD AUTO: 7.2 10E9/L (ref 4–11)

## 2018-04-06 PROCEDURE — 85025 COMPLETE CBC W/AUTO DIFF WBC: CPT | Performed by: FAMILY MEDICINE

## 2018-04-06 PROCEDURE — 99214 OFFICE O/P EST MOD 30 MIN: CPT | Performed by: FAMILY MEDICINE

## 2018-04-06 PROCEDURE — 80076 HEPATIC FUNCTION PANEL: CPT | Performed by: FAMILY MEDICINE

## 2018-04-06 PROCEDURE — 84443 ASSAY THYROID STIM HORMONE: CPT | Performed by: FAMILY MEDICINE

## 2018-04-06 PROCEDURE — 80048 BASIC METABOLIC PNL TOTAL CA: CPT | Performed by: FAMILY MEDICINE

## 2018-04-06 PROCEDURE — 36415 COLL VENOUS BLD VENIPUNCTURE: CPT | Performed by: FAMILY MEDICINE

## 2018-04-06 RX ORDER — TEMAZEPAM 15 MG/1
15 CAPSULE ORAL
Qty: 30 CAPSULE | Refills: 5 | Status: SHIPPED | OUTPATIENT
Start: 2018-04-06 | End: 2018-11-08

## 2018-04-06 RX ORDER — SUMATRIPTAN 50 MG/1
50 TABLET, FILM COATED ORAL
Qty: 18 TABLET | Refills: 3 | Status: SHIPPED | OUTPATIENT
Start: 2018-04-06 | End: 2019-03-25

## 2018-04-06 RX ORDER — TRAMADOL HYDROCHLORIDE 50 MG/1
TABLET ORAL
Qty: 60 TABLET | Refills: 0 | Status: SHIPPED | OUTPATIENT
Start: 2018-04-06 | End: 2022-06-10

## 2018-04-06 RX ORDER — MELOXICAM 7.5 MG/1
7.5 TABLET ORAL DAILY
Qty: 30 TABLET | Refills: 5 | Status: SHIPPED | OUTPATIENT
Start: 2018-04-06 | End: 2019-02-27

## 2018-04-06 RX ORDER — CYCLOBENZAPRINE HCL 10 MG
10 TABLET ORAL
Qty: 30 TABLET | Refills: 11 | Status: SHIPPED | OUTPATIENT
Start: 2018-04-06 | End: 2019-03-25

## 2018-04-06 RX ORDER — LORAZEPAM 0.5 MG/1
0.5 TABLET ORAL DAILY
Qty: 30 TABLET | Refills: 0 | Status: SHIPPED | OUTPATIENT
Start: 2018-04-06 | End: 2019-02-27

## 2018-04-06 NOTE — MR AVS SNAPSHOT
After Visit Summary   4/6/2018    Margot Kendrick    MRN: [id number]           Patient Information     Date Of Birth          1965        Visit Information        Provider Department      4/6/2018 9:20 AM Lakeisha Haley MD [facility]        Today's Diagnoses     Visit for screening mammogram    -  1    Episodic tension-type headache, not intractable        ANXIETY ACUTE STRESS REACTION        Fibromyalgia        Primary insomnia        Intractable migraine with aura without status migrainosus          Care Instructions    [facility] Mammo Schedule  [place] ~ [phone]  One Day Weekly- Alternating Days    [place] ~ [phone]  Every other Monday or Wednesday   & one Saturday morning a month    [place] ~ [phone]  Every Other Monday Morning    [place] ~ [phone]  Every Other Monday Afternoon    [place] ~ [phone]  Every Monday morning  Every Tuesday afternoon  Wed, Thurs, Friday morning & afternoon        Take Zantac 150 mg in the am with mobic if stomach is bothering you    meds refilled     Labs today           Follow-ups after your visit        Who to contact     If you have questions or need follow up information about today's clinic visit or your schedule please contact [facility] directly at [phone].  Normal or non-critical lab and imaging results will be communicated to you by MyChart, letter or phone within 4 business days after the clinic has received the results. If you do not hear from us within 7 days, please contact the clinic through MyChart or phone. If you have a critical or abnormal lab result, we will notify you by phone as soon as possible.  Submit refill requests through [email or web address] or call your pharmacy and they will forward the refill request to us. Please allow 3 business days for your refill to be completed.          Additional Information About Your Visit        MyChart Information      "[facility] gives you secure access to your electronic health record. If you see a primary care provider, you can also send messages to your care team and make appointments. If you have questions, please call your primary care clinic.  If you do not have a primary care provider, please call [phone] and they will assist you.        Care EveryWhere ID     This is your Care EveryWhere ID. This could be used by other organizations to access your [facility] medical records  [id number]        Your Vitals Were     Pulse Temperature Height BMI (Body Mass Index)          72 99.1  F (37.3  C) (Tympanic) 5' 5\" (1.651 m) 26.79 kg/m2         Blood Pressure from Last 3 Encounters:   04/06/18 114/70   09/07/17 113/54   08/08/17 110/64    Weight from Last 3 Encounters:   04/06/18 161 lb (73 kg)   08/08/17 157 lb (71.2 kg)   02/13/17 154 lb (69.9 kg)              We Performed the Following     Basic metabolic panel     CBC with platelets differential     Hepatic panel     TSH with free T4 reflex          Today's Medication Changes          These changes are accurate as of 4/6/18  9:24 AM.  If you have any questions, ask your nurse or doctor.               These medicines have changed or have updated prescriptions.        Dose/Directions    traMADol 50 MG tablet   Commonly known as:  ULTRAM   This may have changed:    - how much to take  - how to take this  - when to take this  - reasons to take this  - additional instructions   Used for:  Fibromyalgia        Take 1-2 at bedtime as needed for pain.   Quantity:  60 tablet   Refills:  0            Where to get your medicines      These medications were sent to [id number] [address]  [address] [address]    Hours:  Closed 10-16-08 business to [facility] Phone:  [phone]     cyclobenzaprine 10 MG tablet    meloxicam 7.5 MG tablet    SUMAtriptan 50 MG tablet         Some of these will need a paper prescription and others " can be bought over the counter.  Ask your nurse if you have questions.     Bring a paper prescription for each of these medications     LORazepam 0.5 MG tablet    temazepam 15 MG capsule    traMADol 50 MG tablet                Primary Care Provider Office Phone # Fax #    Lakeisha Haley [phone] [address] [place] [address]        Equal Access to Services     [facility] : Hadii aad ku hadasho Soomaali, waaxda luqadaha, qaybta kaalmada adeegyada, waxay idiin hayaan adeeg kharash lacassin ah. So [facility] [phone].    ATENCIÓN: Si habla español, tiene a middleton disposición servicios gratuitos de asistencia lingüística. Llame al [phone].    We comply with applicable federal civil rights laws and [place] laws. We do not discriminate on the basis of race, color, national origin, age, disability, sex, sexual orientation, or gender identity.            Thank you!     Thank you for choosing [facility]  for your care. Our goal is always to provide you with excellent care. Hearing back from our patients is one way we can continue to improve our services. Please take a few minutes to complete the written survey that you may receive in the mail after your visit with us. Thank you!             Your Updated Medication List - Protect others around you: Learn how to safely use, store and throw away your medicines at www.disposemymeds.org.          This list is accurate as of 4/6/18  9:24 AM.  Always use your most recent med list.                   Brand Name Dispense Instructions for use Diagnosis    ADVIL 200 MG tablet   Generic drug:  ibuprofen      2 tablets by mouth as needed        ALEVE 220 MG capsule   Generic drug:  naproxen sodium      Take 220 mg by mouth 2 times daily (with meals)        aluminum chloride 20 % external solution    DRYSOL    35 mL    Apply topically At Bedtime To improve effect, cover area of application with plastic wrap,  hold in place with  tight shirt, and wash area in morning. As sweating improves, decrease use to 1-2 times weekly.    Hyperhydrosis disorder       cyclobenzaprine 10 MG tablet    FLEXERIL    30 tablet    Take 1 tablet (10 mg) by mouth nightly as needed for muscle spasms    Episodic tension-type headache, not intractable       LORazepam 0.5 MG tablet    ATIVAN    30 tablet    Take 1 tablet (0.5 mg) by mouth daily For panic or for flying    Predominant disturbance of emotions       meloxicam 7.5 MG tablet    MOBIC    30 tablet    Take 1 tablet (7.5 mg) by mouth daily    Fibromyalgia       SUMAtriptan 50 MG tablet    IMITREX    18 tablet    Take 1 tablet (50 mg) by mouth at onset of headache for migraine May repeat dose in 2 hours.  Do not exceed 200 mg in 24 hours    Intractable migraine with aura without status migrainosus       temazepam 15 MG capsule    RESTORIL    30 capsule    Take 1 capsule (15 mg) by mouth nightly as needed for sleep    Primary insomnia       traMADol 50 MG tablet    ULTRAM    60 tablet    Take 1-2 at bedtime as needed for pain.    Fibromyalgia

## 2018-04-06 NOTE — PROGRESS NOTES
SUBJECTIVE:   Margot Kendrick is a 52 year old female who presents to clinic today for the following health issues:      Fibromyalgia Follow-Up       Type / Location of Pain: between shoulder blades  Analgesia/pain control:       Recent changes:  same      Overall control: Tolerable with discomfort  Activity level/function:      Daily activities:  Can do most things most days, with some rest     Work:  Pain does not limit any  work  Adverse effects:  No  Adherance    Taking medication as directed?  Yes    Participating in other treatments: no -   Risk Factors:    Sleep:  Good    Mood/anxiety:  controlled    Recent family or social stressors:  none noted    Other aggravating factors: none  No flowsheet data found.  No flowsheet data found.  Encounter-Level CSA:     There are no encounter-level csa.          Amount of exercise or physical activity: 4-5 days/week for an average of 15-30 minutes    Problems taking medications regularly: No    Medication side effects: none    Diet: regular (no restrictions)        Musculoskeletal problem/pain      Duration: 2 years    Description  Location: right lower leg pain   But now has some swelling in the leg and calf is tight feeling     Intensity:  mild, pressure, not actual pain    Accompanying signs and symptoms: swelling    History  Previous similar problem: YES  Previous evaluation:  none    Precipitating or alleviating factors:  Trauma or overuse: no   Aggravating factors include: gets worse as day goes on    Therapies tried and outcome: stretching, exercises, Ibuprofen and NSAID - meloxicam.  Helps some, but still having issues        Migraine Follow-Up    Headaches symptoms:  Stable     Frequency: occasionally      Duration of headaches: hours    Able to do normal daily activities/work with migraines: Yes    Rescue/Relief medication:sumatriptan (Imitrex)              Effectiveness: moderate relief    Preventative medication: None    Neurologic complications:  "No new stroke-like symptoms, loss of vision or speech, numbness or weakness    In the past 4 weeks, how often have you gone to Urgent Care or the emergency room because of your headaches?  0              Reviewed and updated as needed this visit by clinical staff  Tobacco  Allergies  Meds  Problems  Med Hx  Surg Hx  Fam Hx  Soc Hx        Reviewed and updated as needed this visit by Provider  Allergies  Meds  Problems         ROS:  Constitutional, HEENT, cardiovascular, pulmonary, gi and gu systems are negative, except as otherwise noted.    OBJECTIVE:                                                    /70 (BP Location: Right arm, Patient Position: Chair, Cuff Size: Adult Large)  Pulse 72  Temp 99.1  F (37.3  C) (Tympanic)  Ht 5' 5\" (1.651 m)  Wt 161 lb (73 kg)  BMI 26.79 kg/m2  Body mass index is 26.79 kg/(m^2).  GENERAL APPEARANCE: healthy, alert and no distress  RESP: lungs clear to auscultation - no rales, rhonchi or wheezes  CV: regular rates and rhythm, normal S1 S2, no S3 or S4 and no murmur, click or rub  MS: extremities normal- no gross deformities noted and mild right LE calf tenderness and swelling mild     PSYCH: mentation appears normal and affect normal/bright         ASSESSMENT/PLAN:                                                    1. Episodic tension-type headache, not intractable  Stable no change in treatment plan.   - cyclobenzaprine (FLEXERIL) 10 MG tablet; Take 1 tablet (10 mg) by mouth nightly as needed for muscle spasms  Dispense: 30 tablet; Refill: 11    2. Fibromyalgia  Stable no change in treatment plan.   - meloxicam (MOBIC) 7.5 MG tablet; Take 1 tablet (7.5 mg) by mouth daily  Dispense: 30 tablet; Refill: 5  - traMADol (ULTRAM) 50 MG tablet; Take 1-2 at bedtime as needed for pain.  Dispense: 60 tablet; Refill: 0  - Hepatic panel  - Basic metabolic panel  - CBC with platelets differential  - TSH with free T4 reflex    3. Primary insomnia  Stable no change in treatment " plan.   - temazepam (RESTORIL) 15 MG capsule; Take 1 capsule (15 mg) by mouth nightly as needed for sleep  Dispense: 30 capsule; Refill: 5    4. Intractable migraine with aura without status migrainosus  Stable no change in treatment plan.   - SUMAtriptan (IMITREX) 50 MG tablet; Take 1 tablet (50 mg) by mouth at onset of headache for migraine May repeat dose in 2 hours.  Do not exceed 200 mg in 24 hours  Dispense: 18 tablet; Refill: 3    5. Right leg swelling  Very unlikely but want to rule out DVT that may be chronic   -  Lower Extremity Venous Duplex Right; Future      Patient Instructions   [facility] Schedule  [facility] ~ [phone]  One Day Weekly- Alternating Days    [place] ~ [phone]  Every other Monday or Wednesday   & one Saturday morning a month    [place] ~ [phone]  Every Other Monday Morning    [place] ~ [phone]  Every Other Monday Afternoon    [place] ~ [phone]  Every Monday morning  Every Tuesday afternoon  Wed, Thurs, Friday morning & afternoon        Take Zantac 150 mg in the am with mobic if stomach is bothering you    meds refilled     Labs today       Lakeisha Haley MD  [facility]   Problem list and histories reviewed & adjusted, as indicated.  Additional history: as documented

## 2018-04-06 NOTE — PATIENT INSTRUCTIONS
[facility] Schedule  [facility] ~ [phone]  One Day Weekly- Alternating Days    [place] ~ [phone]  Every other Monday or Wednesday   & one Saturday morning a month    [place] ~ [phone]  Every Other Monday Morning    [place] ~ [phone]  Every Other Monday Afternoon    [place] ~ [phone]  Every Monday morning  Every Tuesday afternoon  Wed, Thurs, Friday morning & afternoon        Take Zantac 150 mg in the am with mobic if stomach is bothering you    meds refilled     Labs today

## 2018-04-06 NOTE — NURSING NOTE
"Chief Complaint   Patient presents with     Pain     Leg Pain       Initial /70 (BP Location: Right arm, Patient Position: Chair, Cuff Size: Adult Large)  Pulse 72  Temp 99.1  F (37.3  C) (Tympanic)  Ht 5' 5\" (1.651 m)  Wt 161 lb (73 kg)  BMI 26.79 kg/m2 Estimated body mass index is 26.79 kg/(m^2) as calculated from the following:    Height as of this encounter: 5' 5\" (1.651 m).    Weight as of this encounter: 161 lb (73 kg).      Health Maintenance that is potentially due pending provider review:  Mammogram    Gave pt phone number/pended order to schedule mammo and/or colonoscopy(or FIT)    Is there anyone who you would like to be able to receive your results? No  If yes have patient fill out ALLEGRA    "

## 2018-04-09 ENCOUNTER — HOSPITAL ENCOUNTER (OUTPATIENT)
Dept: ULTRASOUND IMAGING | Facility: CLINIC | Age: 53
Discharge: HOME | End: 2018-04-09
Attending: FAMILY MEDICINE | Admitting: FAMILY MEDICINE
Payer: COMMERCIAL

## 2018-04-09 DIAGNOSIS — M79.89 RIGHT LEG SWELLING: ICD-10-CM

## 2018-04-09 PROCEDURE — 93971 EXTREMITY STUDY: CPT | Mod: RT

## 2018-05-23 ENCOUNTER — OFFICE VISIT (OUTPATIENT)
Dept: FAMILY MEDICINE | Facility: CLINIC | Age: 53
End: 2018-05-23
Payer: COMMERCIAL

## 2018-05-23 VITALS
SYSTOLIC BLOOD PRESSURE: 126 MMHG | HEIGHT: 65 IN | TEMPERATURE: 99.4 F | DIASTOLIC BLOOD PRESSURE: 58 MMHG | WEIGHT: 160 LBS | RESPIRATION RATE: 14 BRPM | BODY MASS INDEX: 26.66 KG/M2 | HEART RATE: 108 BPM

## 2018-05-23 DIAGNOSIS — B34.9 VIRAL SYNDROME: Primary | ICD-10-CM

## 2018-05-23 DIAGNOSIS — R09.81 CONGESTION OF PARANASAL SINUS: ICD-10-CM

## 2018-05-23 DIAGNOSIS — R05.9 COUGH: ICD-10-CM

## 2018-05-23 PROCEDURE — 99213 OFFICE O/P EST LOW 20 MIN: CPT | Performed by: NURSE PRACTITIONER

## 2018-05-23 RX ORDER — BENZONATATE 200 MG/1
200 CAPSULE ORAL 3 TIMES DAILY PRN
Qty: 30 CAPSULE | Refills: 0 | Status: SHIPPED | OUTPATIENT
Start: 2018-05-23 | End: 2019-03-25

## 2018-05-23 RX ORDER — AMOXICILLIN 875 MG
875 TABLET ORAL 2 TIMES DAILY
Qty: 20 TABLET | Refills: 0 | Status: SHIPPED | OUTPATIENT
Start: 2018-05-23 | End: 2018-06-02

## 2018-05-23 NOTE — PROGRESS NOTES
SUBJECTIVE:   Margot Kendrick is a 52 year old female who presents to clinic today for the following health issues:      URI      Duration: 9 days     Description (location/character/radiation): URI    Intensity:  moderate    Accompanying signs and symptoms: cough, sore throat, sneezing, gets winded easily, no fever, no chills/sweats, both ears crack when she swallows, no runny nose, sinus pressure, headaches, one day of upset stomach and diarrhea     History (similar episodes/previous evaluation): None    Precipitating or alleviating factors: None    Therapies tried and outcome: severe cold and congestion otc medicine           Problem list and histories reviewed & adjusted, as indicated.  Additional history: as documented    Patient Active Problem List   Diagnosis     Abdominal pain, generalized     Irritable bowel syndrome     Esophageal reflux     Sensorineural hearing loss     Tobacco abuse     Chiari malformation type I (H)     FAMILY HISTORY OF BLOOD DISORDER-NONSPEC     Migraine headache     Vitamin D deficiency     CARDIOVASCULAR SCREENING; LDL GOAL LESS THAN 160     Insomnia     Fibromyalgia     Past Surgical History:   Procedure Laterality Date     ABDOMEN SURGERY  2005    Right Sanjiv-Collectomy     APPENDECTOMY  2005    With collectomy     COLONOSCOPY       COLONOSCOPY  6/11/2012    Procedure: COLONOSCOPY;  Colonoscopy;  Surgeon: Jun Reddy MD;  Location: [facility]     COLONOSCOPY N/A 2/15/2016    Procedure: COLONOSCOPY;  Surgeon: Anson Sethi MD;  Location: [facility]     ENT SURGERY       ENT SURGERY  2000    Sinus     ESOPHAGOSCOPY, GASTROSCOPY, DUODENOSCOPY (EGD), COMBINED N/A 10/8/2015    Procedure: COMBINED ESOPHAGOSCOPY, GASTROSCOPY, DUODENOSCOPY (EGD), BIOPSY SINGLE OR MULTIPLE;  Surgeon: Anson Sethi MD;  Location: [facility]     EYE SURGERY  2015    Recurrent corneal erosion     HYSTERECTOMY, VAGINAL  2002     LAPAROSCOPIC CHOLECYSTECTOMY N/A 10/27/2015    Procedure: LAPAROSCOPIC  CHOLECYSTECTOMY;  Surgeon: Anson Sethi MD;  Location: [facility]     ORTHOPEDIC SURGERY       ORTHOPEDIC SURGERY  /    Left shoulder/Right Shoulder/Right Wrist x 3     SURGICAL HISTORY OF -   ,,         SURGICAL HISTORY OF -   1993    Excision (L) ganglion cyst     SURGICAL HISTORY OF 1993    Tubal ligation     SURGICAL HISTORY OF -   1998    Septoplasty/sinus surgery     SURGICAL HISTORY OF -       TVH     SURGICAL HISTORY OF -   2004/ /15/2005    Colonoscopy     SURGICAL HISTORY OF -   2001    Gastroscopy     SURGICAL HISTORY OF -       Septoplasty     SURGICAL HISTORY OF -       rt craig colectomy     SURGICAL HISTORY OF -       Open distal clavicle resection with subacromial decompression     SURGICAL HISTORY OF -   2008    rt wrist TFCC     SURGICAL HISTORY OF -   2008    rt wrist removal of scar tissue     SURGICAL HISTORY OF -   2009    Arthroscopic subacromial decompression with introduction of On-Q pain pump.        Social History   Substance Use Topics     Smoking status: Former Smoker     Packs/day: 0.50     Types: Cigarettes     Smokeless tobacco: Never Used      Comment: quit plan offered     Alcohol use No     Family History   Problem Relation Age of Onset     C.A.D. Maternal Grandmother      Arthritis Maternal Grandmother      rheumatoid arthritis     DIABETES Maternal Grandmother      Cardiovascular Maternal Grandfather      Coronary Artery Disease Maternal Grandfather      Cardiovascular Paternal Grandmother      Cardiovascular Paternal Grandfather      Respiratory Daughter      ASTHMA     Neurologic Disorder Father      FIBROMYALGIA     HEART DISEASE Father 68     C.A.D. Father 68     MI     OSTEOPOROSIS Father      Neurologic Disorder Brother      FIBROMYALGIA     Neurologic Disorder Sister      FIBROMYALGIA     Anxiety Disorder Sister      Thyroid Disease Mother      Hypertension Mother       Hyperlipidemia Mother      Arthritis Maternal Aunt      psoriatic     DIABETES Other      Maternal Aunt     Coronary Artery Disease Other      Maternal Aunt     Hypertension Other      Maternal Aunt     Hyperlipidemia Other      Maternal Aunt     Melanoma No family hx of          Current Outpatient Prescriptions   Medication Sig Dispense Refill     ADVIL 200 MG OR TABS 2 tablets by mouth as needed       aluminum chloride (DRYSOL) 20 % external solution Apply topically At Bedtime To improve effect, cover area of application with plastic wrap,  hold in place with tight shirt, and wash area in morning. As sweating improves, decrease use to 1-2 times weekly. 35 mL 5     amoxicillin (AMOXIL) 875 MG tablet Take 1 tablet (875 mg) by mouth 2 times daily for 10 days 20 tablet 0     benzonatate (TESSALON) 200 MG capsule Take 1 capsule (200 mg) by mouth 3 times daily as needed 30 capsule 0     cyclobenzaprine (FLEXERIL) 10 MG tablet Take 1 tablet (10 mg) by mouth nightly as needed for muscle spasms (Patient not taking: Reported on 5/23/2018) 30 tablet 11     LORazepam (ATIVAN) 0.5 MG tablet Take 1 tablet (0.5 mg) by mouth daily For panic or for flying 30 tablet 0     meloxicam (MOBIC) 7.5 MG tablet Take 1 tablet (7.5 mg) by mouth daily 30 tablet 5     naproxen sodium (ALEVE) 220 MG capsule Take 220 mg by mouth 2 times daily (with meals)       SUMAtriptan (IMITREX) 50 MG tablet Take 1 tablet (50 mg) by mouth at onset of headache for migraine May repeat dose in 2 hours.  Do not exceed 200 mg in 24 hours 18 tablet 3     temazepam (RESTORIL) 15 MG capsule Take 1 capsule (15 mg) by mouth nightly as needed for sleep 30 capsule 5     traMADol (ULTRAM) 50 MG tablet Take 1-2 at bedtime as needed for pain. 60 tablet 0     Allergies   Allergen Reactions     Asa [Aspirin] Nausea     TINGLING  FINGERS, HEAD,      Labs reviewed in EPIC    Reviewed and updated as needed this visit by clinical staff  Tobacco  Allergies  Meds  Problems   "Med Hx  Surg Hx  Fam Hx  Soc Hx        Reviewed and updated as needed this visit by Provider  Allergies  Meds  Problems         ROS:  Constitutional, HEENT, cardiovascular, pulmonary, GI, , musculoskeletal, neuro, skin, endocrine and psych systems are negative, except as otherwise noted.    OBJECTIVE:     /58  Pulse 108  Temp 99.4  F (37.4  C) (Tympanic)  Resp 14  Ht 5' 5\" (1.651 m)  Wt 160 lb (72.6 kg)  BMI 26.63 kg/m2  Body mass index is 26.63 kg/(m^2).   GENERAL: healthy, alert and no distress, nontoxic in appearance  EYES: Eyes grossly normal to inspection, PERRL and conjunctivae and sclerae normal  HENT: ear canals and TM's normal, nose and mouth without ulcers or lesions  NECK: no adenopathy, supple with full ROM  RESP: lungs clear to auscultation - no rales, rhonchi or wheezes  CV: regular rate and rhythm, normal S1 S2, no S3 or S4, no murmur, click or rub, no peripheral edema   ABDOMEN: soft, nontender, no hepatosplenomegaly, no masses and bowel sounds normal  MS: no gross musculoskeletal defects noted, no edema  No rash    Diagnostic Test Results:  No results found for this or any previous visit (from the past 24 hour(s)).    ASSESSMENT/PLAN:     Problem List Items Addressed This Visit     None      Visit Diagnoses     Viral syndrome    -  Primary    Relevant Medications    amoxicillin (AMOXIL) 875 MG tablet    Congestion of paranasal sinus        Cough        Relevant Medications    benzonatate (TESSALON) 200 MG capsule               Patient Instructions   Increase rest and fluids. Tylenol and/or Ibuprofen for comfort. Cool mist vaporizer. If your symptoms worsen or do not resolve follow up with your primary care provider in 1 week and sooner if needed. Hold your antibiotic Rx for 3-4 days and take only if your symptoms of sinus infection worsen.    Mucinex 600 mg 12 hour formula for ear, head and chest congestion.  It can also thin post nasal drip which can cause a cough and sore " "throat.         Indications for emergent return to emergency department discussed with patient, who verbalized good understanding and agreement.  Patient understands the limitations of today's evaluation.           Viral Syndrome (Adult)  A viral illness may cause a number of symptoms. The symptoms depend on the part of the body that the virus affects. If it settles in your nose, throat, and lungs, it may cause cough, sore throat, congestion, and sometimes headache. If it settles in your stomach and intestinal tract, it may cause vomiting and diarrhea. Sometimes it causes vague symptoms like \"aching all over,\" feeling tired, loss of appetite, or fever.  A viral illness usually lasts 1 to 2 weeks, but sometimes it lasts longer. In some cases, a more serious infection can look like a viral syndrome in the first few days of the illness. You may need another exam and additional tests to know the difference. Watch for the warning signs listed below.  Home care  Follow these guidelines for taking care of yourself at home:    If symptoms are severe, rest at home for the first 2 to 3 days.    Stay away from cigarette smoke - both your smoke and the smoke from others.    You may use over-the-counter acetaminophen or ibuprofen for fever, muscle aching, and headache, unless another medicine was prescribed for this. If you have chronic liver or kidney disease or ever had a stomach ulcer or GI bleeding, talk with your doctor before using these medicines. No one who is younger than 18 and ill with a fever should take aspirin. It may cause severe disease or death.    Your appetite may be poor, so a light diet is fine. Avoid dehydration by drinking 8 to 12 8-ounce glasses of fluids each day. This may include water; orange juice; lemonade; apple, grape, and cranberry juice; clear fruit drinks; electrolyte replacement and sports drinks; and decaffeinated teas and coffee. If you have been diagnosed with a kidney disease, ask your " doctor how much and what types of fluids you should drink to prevent dehydration. If you have kidney disease, drinking too much fluid can cause it build up in the your body and be dangerous to your health.    Over-the-counter remedies won't shorten the length of the illness but may be helpful for cough, sore throat; and nasal and sinus congestion. Don't use decongestants if you have high blood pressure.  Follow-up care  Follow up with your healthcare provider if you do not improve over the next week.  Call 911  Call 911 if any of the following occur:    Convulsion    Feeling weak, dizzy, or like you are going to faint    Chest pain, shortness of breath, wheezing, or difficulty breathing  When to seek medical advice  Call your healthcare provider right away if any of these occur:    Cough with lots of colored sputum (mucus) or blood in your sputum    Chest pain, shortness of breath, wheezing, or difficulty breathing    Severe headache; face, neck, or ear pain    Severe, constant pain in the lower right side of your belly (abdominal)    Continued vomiting (can t keep liquids down)    Frequent diarrhea (more than 5 times a day); blood (red or black color) or mucus in diarrhea    Feeling weak, dizzy, or like you are going to faint    Extreme thirst    Fever of 100.4 F (38 C) or higher, or as directed by your healthcare provider  Date Last Reviewed: 9/25/2015 2000-2017 [employer]. [address] [place] [address]. All rights reserved. This information is not intended as a substitute for professional medical care. Always follow your healthcare professional's instructions.            CONSTANTIN Smith [facility]

## 2018-05-23 NOTE — PATIENT INSTRUCTIONS
"Increase rest and fluids. Tylenol and/or Ibuprofen for comfort. Cool mist vaporizer. If your symptoms worsen or do not resolve follow up with your primary care provider in 1 week and sooner if needed. Hold your antibiotic Rx for 3-4 days and take only if your symptoms of sinus infection worsen.    Mucinex 600 mg 12 hour formula for ear, head and chest congestion.  It can also thin post nasal drip which can cause a cough and sore throat.         Indications for emergent return to emergency department discussed with patient, who verbalized good understanding and agreement.  Patient understands the limitations of today's evaluation.           Viral Syndrome (Adult)  A viral illness may cause a number of symptoms. The symptoms depend on the part of the body that the virus affects. If it settles in your nose, throat, and lungs, it may cause cough, sore throat, congestion, and sometimes headache. If it settles in your stomach and intestinal tract, it may cause vomiting and diarrhea. Sometimes it causes vague symptoms like \"aching all over,\" feeling tired, loss of appetite, or fever.  A viral illness usually lasts 1 to 2 weeks, but sometimes it lasts longer. In some cases, a more serious infection can look like a viral syndrome in the first few days of the illness. You may need another exam and additional tests to know the difference. Watch for the warning signs listed below.  Home care  Follow these guidelines for taking care of yourself at home:    If symptoms are severe, rest at home for the first 2 to 3 days.    Stay away from cigarette smoke - both your smoke and the smoke from others.    You may use over-the-counter acetaminophen or ibuprofen for fever, muscle aching, and headache, unless another medicine was prescribed for this. If you have chronic liver or kidney disease or ever had a stomach ulcer or GI bleeding, talk with your doctor before using these medicines. No one who is younger than 18 and ill with a fever " should take aspirin. It may cause severe disease or death.    Your appetite may be poor, so a light diet is fine. Avoid dehydration by drinking 8 to 12 8-ounce glasses of fluids each day. This may include water; orange juice; lemonade; apple, grape, and cranberry juice; clear fruit drinks; electrolyte replacement and sports drinks; and decaffeinated teas and coffee. If you have been diagnosed with a kidney disease, ask your doctor how much and what types of fluids you should drink to prevent dehydration. If you have kidney disease, drinking too much fluid can cause it build up in the your body and be dangerous to your health.    Over-the-counter remedies won't shorten the length of the illness but may be helpful for cough, sore throat; and nasal and sinus congestion. Don't use decongestants if you have high blood pressure.  Follow-up care  Follow up with your healthcare provider if you do not improve over the next week.  Call 911  Call 911 if any of the following occur:    Convulsion    Feeling weak, dizzy, or like you are going to faint    Chest pain, shortness of breath, wheezing, or difficulty breathing  When to seek medical advice  Call your healthcare provider right away if any of these occur:    Cough with lots of colored sputum (mucus) or blood in your sputum    Chest pain, shortness of breath, wheezing, or difficulty breathing    Severe headache; face, neck, or ear pain    Severe, constant pain in the lower right side of your belly (abdominal)    Continued vomiting (can t keep liquids down)    Frequent diarrhea (more than 5 times a day); blood (red or black color) or mucus in diarrhea    Feeling weak, dizzy, or like you are going to faint    Extreme thirst    Fever of 100.4 F (38 C) or higher, or as directed by your healthcare provider  Date Last Reviewed: 9/25/2015 2000-2017 [employer]. [address] [place] [address], [place] [address]. All rights reserved. This information is not intended as a  substitute for professional medical care. Always follow your healthcare professional's instructions.

## 2018-05-23 NOTE — MR AVS SNAPSHOT
"              After Visit Summary   5/23/2018    Margot Kendrick    MRN: [id number]           Patient Information     Date Of Birth          1965        Visit Information        Provider Department      5/23/2018 12:00 PM Michaelle Sanchez APRN [facility]        Today's Diagnoses     Viral syndrome    -  1    Congestion of paranasal sinus        Cough          Care Instructions    Increase rest and fluids. Tylenol and/or Ibuprofen for comfort. Cool mist vaporizer. If your symptoms worsen or do not resolve follow up with your primary care provider in 1 week and sooner if needed. Hold your antibiotic Rx for 3-4 days and take only if your symptoms of sinus infection worsen.    Mucinex 600 mg 12 hour formula for ear, head and chest congestion.  It can also thin post nasal drip which can cause a cough and sore throat.         Indications for emergent return to emergency department discussed with patient, who verbalized good understanding and agreement.  Patient understands the limitations of today's evaluation.           Viral Syndrome (Adult)  A viral illness may cause a number of symptoms. The symptoms depend on the part of the body that the virus affects. If it settles in your nose, throat, and lungs, it may cause cough, sore throat, congestion, and sometimes headache. If it settles in your stomach and intestinal tract, it may cause vomiting and diarrhea. Sometimes it causes vague symptoms like \"aching all over,\" feeling tired, loss of appetite, or fever.  A viral illness usually lasts 1 to 2 weeks, but sometimes it lasts longer. In some cases, a more serious infection can look like a viral syndrome in the first few days of the illness. You may need another exam and additional tests to know the difference. Watch for the warning signs listed below.  Home care  Follow these guidelines for taking care of yourself at home:    If symptoms are severe, rest at home for the first 2 " to 3 days.    Stay away from cigarette smoke - both your smoke and the smoke from others.    You may use over-the-counter acetaminophen or ibuprofen for fever, muscle aching, and headache, unless another medicine was prescribed for this. If you have chronic liver or kidney disease or ever had a stomach ulcer or GI bleeding, talk with your doctor before using these medicines. No one who is younger than 18 and ill with a fever should take aspirin. It may cause severe disease or death.    Your appetite may be poor, so a light diet is fine. Avoid dehydration by drinking 8 to 12 8-ounce glasses of fluids each day. This may include water; orange juice; lemonade; apple, grape, and cranberry juice; clear fruit drinks; electrolyte replacement and sports drinks; and decaffeinated teas and coffee. If you have been diagnosed with a kidney disease, ask your doctor how much and what types of fluids you should drink to prevent dehydration. If you have kidney disease, drinking too much fluid can cause it build up in the your body and be dangerous to your health.    Over-the-counter remedies won't shorten the length of the illness but may be helpful for cough, sore throat; and nasal and sinus congestion. Don't use decongestants if you have high blood pressure.  Follow-up care  Follow up with your healthcare provider if you do not improve over the next week.  Call 911  Call 911 if any of the following occur:    Convulsion    Feeling weak, dizzy, or like you are going to faint    Chest pain, shortness of breath, wheezing, or difficulty breathing  When to seek medical advice  Call your healthcare provider right away if any of these occur:    Cough with lots of colored sputum (mucus) or blood in your sputum    Chest pain, shortness of breath, wheezing, or difficulty breathing    Severe headache; face, neck, or ear pain    Severe, constant pain in the lower right side of your belly (abdominal)    Continued vomiting (can t keep liquids  down)    Frequent diarrhea (more than 5 times a day); blood (red or black color) or mucus in diarrhea    Feeling weak, dizzy, or like you are going to faint    Extreme thirst    Fever of 100.4 F (38 C) or higher, or as directed by your healthcare provider  Date Last Reviewed: 9/25/2015 2000-2017 [employer]. [address] [place] [address]. All rights reserved. This information is not intended as a substitute for professional medical care. Always follow your healthcare professional's instructions.                Follow-ups after your visit        Follow-up notes from your care team     See patient instructions section of the AVS Return if symptoms worsen or fail to improve, for Follow up with your primary care provider.      Who to contact     If you have questions or need follow up information about today's clinic visit or your schedule please contact [facility] directly at [phone].  Normal or non-critical lab and imaging results will be communicated to you by DocuSpeakhart, letter or phone within 4 business days after the clinic has received the results. If you do not hear from us within 7 days, please contact the clinic through DocuSpeakhart or phone. If you have a critical or abnormal lab result, we will notify you by phone as soon as possible.  Submit refill requests through Wallix or call your pharmacy and they will forward the refill request to us. Please allow 3 business days for your refill to be completed.          Additional Information About Your Visit        [employer]akh[employer] Information     [employer] gives you secure access to your electronic health record. If you see a primary care provider, you can also send messages to your care team and make appointments. If you have questions, please call your primary care clinic.  If you do not have a primary care provider, please call [phone] and they will assist you.        Care EveryWhere ID     This is your Care EveryWhere ID.  "This could be used by other organizations to access your [place] medical records  [id number]        Your Vitals Were     Pulse Temperature Respirations Height BMI (Body Mass Index)       108 99.4  F (37.4  C) (Tympanic) 14 5' 5\" (1.651 m) 26.63 kg/m2        Blood Pressure from Last 3 Encounters:   05/23/18 126/58   04/06/18 114/70   09/07/17 113/54    Weight from Last 3 Encounters:   05/23/18 160 lb (72.6 kg)   04/06/18 161 lb (73 kg)   08/08/17 157 lb (71.2 kg)              Today, you had the following     No orders found for display         Today's Medication Changes          These changes are accurate as of 5/23/18 12:33 PM.  If you have any questions, ask your nurse or doctor.               Start taking these medicines.        Dose/Directions    amoxicillin 875 MG tablet   Commonly known as:  AMOXIL   Used for:  Viral syndrome   Started by:  Michaelle Sanchez APRN CNP        Dose:  875 mg   Take 1 tablet (875 mg) by mouth 2 times daily for 10 days   Quantity:  20 tablet   Refills:  0       benzonatate 200 MG capsule   Commonly known as:  TESSALON   Used for:  Cough   Started by:  Michaelle Sanchez APRN CNP        Dose:  200 mg   Take 1 capsule (200 mg) by mouth 3 times daily as needed   Quantity:  30 capsule   Refills:  0            Where to get your medicines      These medications were sent to [id number] [address] [address]    Hours:  Closed 10-16-08 business to [facility] Phone:  [phone]     benzonatate 200 MG capsule         Some of these will need a paper prescription and others can be bought over the counter.  Ask your nurse if you have questions.     Bring a paper prescription for each of these medications     amoxicillin 875 MG tablet                Primary Care Provider Office Phone # Fax #    Lakeisha Haley [phone] [address] [facility] [address]        Equal Access to " Services     [facility]: Hadii aad ku hadameliabrando Gelyyasmeen, waaxda luqadaha, qaybta kaalmada siomara, bobby fair . So [facility] [phone].    ATENCIÓN: Si habla charo, tiene a middleton disposición servicios gratuitos de asistencia lingüística. Llame al [phone].    We comply with applicable federal civil rights laws and [place] laws. We do not discriminate on the basis of race, color, national origin, age, disability, sex, sexual orientation, or gender identity.            Thank you!     Thank you for choosing [facility]  for your care. Our goal is always to provide you with excellent care. Hearing back from our patients is one way we can continue to improve our services. Please take a few minutes to complete the written survey that you may receive in the mail after your visit with us. Thank you!             Your Updated Medication List - Protect others around you: Learn how to safely use, store and throw away your medicines at www.disposemymeds.org.          This list is accurate as of 5/23/18 12:33 PM.  Always use your most recent med list.                   Brand Name Dispense Instructions for use Diagnosis    ADVIL 200 MG tablet   Generic drug:  ibuprofen      2 tablets by mouth as needed        ALEVE 220 MG capsule   Generic drug:  naproxen sodium      Take 220 mg by mouth 2 times daily (with meals)        aluminum chloride 20 % external solution    DRYSOL    35 mL    Apply topically At Bedtime To improve effect, cover area of application with plastic wrap,  hold in place with tight shirt, and wash area in morning. As sweating improves, decrease use to 1-2 times weekly.    Hyperhydrosis disorder       amoxicillin 875 MG tablet    AMOXIL    20 tablet    Take 1 tablet (875 mg) by mouth 2 times daily for 10 days    Viral syndrome       benzonatate 200 MG capsule    TESSALON    30 capsule    Take 1 capsule (200 mg) by mouth 3 times daily as needed    Cough        cyclobenzaprine 10 MG tablet    FLEXERIL    30 tablet    Take 1 tablet (10 mg) by mouth nightly as needed for muscle spasms    Episodic tension-type headache, not intractable       LORazepam 0.5 MG tablet    ATIVAN    30 tablet    Take 1 tablet (0.5 mg) by mouth daily For panic or for flying        meloxicam 7.5 MG tablet    MOBIC    30 tablet    Take 1 tablet (7.5 mg) by mouth daily    Fibromyalgia       SUMAtriptan 50 MG tablet    IMITREX    18 tablet    Take 1 tablet (50 mg) by mouth at onset of headache for migraine May repeat dose in 2 hours.  Do not exceed 200 mg in 24 hours    Intractable migraine with aura without status migrainosus       temazepam 15 MG capsule    RESTORIL    30 capsule    Take 1 capsule (15 mg) by mouth nightly as needed for sleep    Primary insomnia       traMADol 50 MG tablet    ULTRAM    60 tablet    Take 1-2 at bedtime as needed for pain.    Fibromyalgia

## 2018-05-23 NOTE — NURSING NOTE
"Chief Complaint   Patient presents with     URI       Initial /58  Pulse 108  Temp 99.4  F (37.4  C) (Tympanic)  Resp 14  Ht 5' 5\" (1.651 m)  Wt 160 lb (72.6 kg)  BMI 26.63 kg/m2 Estimated body mass index is 26.63 kg/(m^2) as calculated from the following:    Height as of this encounter: 5' 5\" (1.651 m).    Weight as of this encounter: 160 lb (72.6 kg).      Health Maintenance that is potentially due pending provider review:  NONE    n/a    Is there anyone who you would like to be able to receive your results? No  If yes have patient fill out ALLEGRA      "

## 2018-06-12 ENCOUNTER — MYC MEDICAL ADVICE (OUTPATIENT)
Dept: FAMILY MEDICINE | Facility: CLINIC | Age: 53
End: 2018-06-12

## 2018-07-09 ENCOUNTER — HOSPITAL ENCOUNTER (OUTPATIENT)
Dept: MRI IMAGING | Facility: CLINIC | Age: 53
End: 2018-07-09
Attending: ORTHOPAEDIC SURGERY
Payer: COMMERCIAL

## 2018-07-09 ENCOUNTER — HOSPITAL ENCOUNTER (OUTPATIENT)
Dept: MRI IMAGING | Facility: CLINIC | Age: 53
Discharge: HOME | End: 2018-07-09
Attending: ORTHOPAEDIC SURGERY | Admitting: ORTHOPAEDIC SURGERY
Payer: COMMERCIAL

## 2018-07-09 DIAGNOSIS — M25.561 RIGHT KNEE PAIN, UNSPECIFIED CHRONICITY: ICD-10-CM

## 2018-07-09 PROCEDURE — 73718 MRI LOWER EXTREMITY W/O DYE: CPT | Mod: RT

## 2018-07-09 PROCEDURE — 73721 MRI JNT OF LWR EXTRE W/O DYE: CPT | Mod: RT

## 2018-08-02 ENCOUNTER — HOSPITAL ENCOUNTER (OUTPATIENT)
Dept: NUCLEAR MEDICINE | Facility: CLINIC | Age: 53
Setting detail: NUCLEAR MEDICINE
Discharge: HOME | End: 2018-08-02
Attending: ORTHOPAEDIC SURGERY | Admitting: ORTHOPAEDIC SURGERY
Payer: COMMERCIAL

## 2018-08-02 ENCOUNTER — HOSPITAL ENCOUNTER (OUTPATIENT)
Dept: NUCLEAR MEDICINE | Facility: CLINIC | Age: 53
Setting detail: NUCLEAR MEDICINE
End: 2018-08-02
Attending: ORTHOPAEDIC SURGERY
Payer: COMMERCIAL

## 2018-08-02 DIAGNOSIS — M25.561 RIGHT KNEE PAIN: ICD-10-CM

## 2018-08-02 PROCEDURE — 78315 BONE IMAGING 3 PHASE: CPT

## 2018-08-02 PROCEDURE — A9561 TC99M OXIDRONATE: HCPCS | Performed by: ORTHOPAEDIC SURGERY

## 2018-08-02 PROCEDURE — 34300033 ZZH RX 343: Performed by: ORTHOPAEDIC SURGERY

## 2018-08-02 RX ADMIN — Medication 25.3 MILLICURIE: at 07:33

## 2018-11-08 DIAGNOSIS — F51.01 PRIMARY INSOMNIA: ICD-10-CM

## 2018-11-08 RX ORDER — TEMAZEPAM 15 MG/1
CAPSULE ORAL
Qty: 30 CAPSULE | Refills: 5 | Status: SHIPPED | OUTPATIENT
Start: 2018-11-08 | End: 2019-02-27

## 2018-11-08 NOTE — TELEPHONE ENCOUNTER
temazepam (RESTORIL) 15 MG capsule      Last Written Prescription Date:  4/6/18  Last Fill Quantity: 30,   # refills: 5  Last Office Visit: 5/23/18  Future Office visit:       Routing refill request to provider for review/approval because:  Drug not on the [facility], [facility] or [facility] refill protocol or controlled substance

## 2018-12-01 ENCOUNTER — MYC MEDICAL ADVICE (OUTPATIENT)
Dept: FAMILY MEDICINE | Facility: CLINIC | Age: 53
End: 2018-12-01

## 2018-12-05 ENCOUNTER — HOSPITAL ENCOUNTER (EMERGENCY)
Facility: CLINIC | Age: 53
Discharge: HOME | End: 2018-12-05
Attending: NURSE PRACTITIONER | Admitting: NURSE PRACTITIONER
Payer: COMMERCIAL

## 2018-12-05 VITALS
DIASTOLIC BLOOD PRESSURE: 77 MMHG | OXYGEN SATURATION: 98 % | RESPIRATION RATE: 16 BRPM | SYSTOLIC BLOOD PRESSURE: 123 MMHG | TEMPERATURE: 99 F

## 2018-12-05 DIAGNOSIS — J02.9 VIRAL PHARYNGITIS: Primary | ICD-10-CM

## 2018-12-05 LAB
INTERNAL QC OK POCT: YES
S PYO AG THROAT QL IA.RAPID: NEGATIVE

## 2018-12-05 PROCEDURE — G0463 HOSPITAL OUTPT CLINIC VISIT: HCPCS

## 2018-12-05 PROCEDURE — 87880 STREP A ASSAY W/OPTIC: CPT | Performed by: NURSE PRACTITIONER

## 2018-12-05 PROCEDURE — 87081 CULTURE SCREEN ONLY: CPT | Performed by: NURSE PRACTITIONER

## 2018-12-05 PROCEDURE — 99213 OFFICE O/P EST LOW 20 MIN: CPT | Performed by: NURSE PRACTITIONER

## 2018-12-05 NOTE — ED AVS SNAPSHOT
[facility]    [address] [facility] [address]    Phone:  [phone]    Fax:  [phone]                                       Margot Kendrick   MRN: [id number]    Department:  [facility]   Date of Visit:  12/5/2018           Patient Information     Date Of Birth          1965        Your diagnoses for this visit were:     Viral pharyngitis        You were seen by Rosa Vaca APRN CNP.      Follow-up Information     Follow up with Lakeisha Haley MD In 1 week.    Specialty:  Family Practice    Why:  If symptoms worsen, As needed    Contact information:    [address] [place] [address]  [phone]          Discharge Instructions         Self-Care for Sore Throats    Sore throats happen for many reasons, such as colds, allergies, and infections caused by viruses or bacteria. In any case, your throat becomes red and sore. Your goal for self-care is to reduce your discomfort while giving your throat a chance to heal.  Moisten and soothe your throat  Tips include the following:    Try a sip of water first thing after waking up.    Keep your throat moist by drinking 6 or more glasses of clear liquids every day.    Run a cool-air humidifier in your room overnight.    Avoid cigarette smoke.     Suck on throat lozenges, cough drops, hard candy, ice chips, or frozen fruit-juice bars. Use the sugar-free versions if your diet or medical condition requires them.  Gargle to ease irritation  Gargling every hour or 2 can ease irritation. Try gargling with 1 of these solutions:    1/4 teaspoon of salt in 1/2 cup of warm water    An over-the-counter anesthetic gargle  Use medicine for more relief  Over-the-counter medicine can reduce sore throat symptoms. Ask your pharmacist if you have questions about which medicine to use:    Ease pain with anesthetic sprays. Aspirin or an aspirin substitute also helps. Remember, never give  aspirin to anyone 18 or younger, or if you are already taking blood thinners.     For sore throats caused by allergies, try antihistamines to block the allergic reaction.    Remember: unless a sore throat is caused by a bacterial infection, antibiotics won t help you.  Prevent future sore throats  Prevention tips include the following:    Stop smoking or reduce contact with secondhand smoke. Smoke irritates the tender throat lining.    Limit contact with pets and with allergy-causing substances, such as pollen and mold.    When you re around someone with a sore throat or cold, wash your hands often to keep viruses or bacteria from spreading.    Don t strain your vocal cords.  Call your healthcare provider  Contact your healthcare provider if you have:    A temperature over 101 F (38.3 C)    White spots on the throat    Great difficulty swallowing    Trouble breathing    A skin rash    Recent exposure to someone else with strep bacteria    Severe hoarseness and swollen glands in the neck or jaw   Date Last Reviewed: 8/1/2016 2000-2018 [employer]. [address] [place] [address]. All rights reserved. This information is not intended as a substitute for professional medical care. Always follow your healthcare professional's instructions.          24 Hour Appointment Hotline       To make an appointment at any [facility], call [phone] ([phone]). If you don't have a family doctor or clinic, we will help you find one. [facility] clinics are conveniently located to serve the needs of you and your family.             Review of your medicines      Our records show that you are taking the medicines listed below. If these are incorrect, please call your family doctor or clinic.        Dose / Directions Last dose taken    ADVIL 200 MG tablet   Generic drug:  ibuprofen        2 tablets by mouth as needed   Refills:  0        ALEVE 220 MG capsule   Dose:  220 mg   Generic drug:   naproxen sodium        Take 220 mg by mouth 2 times daily (with meals)   Refills:  0        aluminum chloride 20 % external solution   Commonly known as:  DRYSOL   Quantity:  35 mL        Apply topically At Bedtime To improve effect, cover area of application with plastic wrap,  hold in place with tight shirt, and wash area in morning. As sweating improves, decrease use to 1-2 times weekly.   Refills:  5        benzonatate 200 MG capsule   Commonly known as:  TESSALON   Dose:  200 mg   Quantity:  30 capsule        Take 1 capsule (200 mg) by mouth 3 times daily as needed   Refills:  0        cyclobenzaprine 10 MG tablet   Commonly known as:  FLEXERIL   Dose:  10 mg   Quantity:  30 tablet        Take 1 tablet (10 mg) by mouth nightly as needed for muscle spasms   Refills:  11        LORazepam 0.5 MG tablet   Commonly known as:  ATIVAN   Dose:  0.5 mg   Quantity:  30 tablet        Take 1 tablet (0.5 mg) by mouth daily For panic or for flying   Refills:  0        meloxicam 7.5 MG tablet   Commonly known as:  MOBIC   Dose:  7.5 mg   Quantity:  30 tablet        Take 1 tablet (7.5 mg) by mouth daily   Refills:  5        SUMAtriptan 50 MG tablet   Commonly known as:  IMITREX   Dose:  50 mg   Quantity:  18 tablet        Take 1 tablet (50 mg) by mouth at onset of headache for migraine May repeat dose in 2 hours.  Do not exceed 200 mg in 24 hours   Refills:  3        temazepam 15 MG capsule   Commonly known as:  RESTORIL   Quantity:  30 capsule        TAKE ONE CAPSULE BY MOUTH NIGHTLY AT BEDTIME AS NEEDED FOR SLEEP   Refills:  5        traMADol 50 MG tablet   Commonly known as:  ULTRAM   Quantity:  60 tablet        Take 1-2 at bedtime as needed for pain.   Refills:  0                Procedures and tests performed during your visit     Rapid strep group A screen POCT      Orders Needing Specimen Collection     Ordered          12/05/18 1540  Beta strep group A r/o culture - ROUTINE, Prio: Routine, Status: Sent     Scheduled  Task Status   12/05/18 1541 [facility] CC Reminder: Open   Order Class:  PCU Collect                  Pending Results     No orders found from 12/3/2018 to 12/6/2018.            Pending Culture Results     No orders found from 12/3/2018 to 12/6/2018.            Pending Results Instructions     If you had any lab results that were not finalized at the time of your Discharge, you can call the ED Lab Result RN at [phone]. You will be contacted by this team for any positive Lab results or changes in treatment. The nurses are available 7 days a week from 10A to 6:30P.  You can leave a message 24 hours per day and they will return your call.        Test Results From Your Hospital Stay        12/5/2018  3:41 PM      Component Results     Component Value Ref Range & Units Status    Rapid Strep A Screen negative neg Final    Internal QC OK Yes  Final                Thank you for choosing [facility]       Thank you for choosing [facility] for your care. Our goal is always to provide you with excellent care. Hearing back from our patients is one way we can continue to improve our services. Please take a few minutes to complete the written survey that you may receive in the mail after you visit with us. Thank you!        [employer] Information     GreenPocket gives you secure access to your electronic health record. If you see a primary care provider, you can also send messages to your care team and make appointments. If you have questions, please call your primary care clinic.  If you do not have a primary care provider, please call [phone] and they will assist you.        Care EveryWhere ID     This is your Care EveryWhere ID. This could be used by other organizations to access your [facility] medical records  [id number]        Equal Access to Services     MARIA LUISA MACIAS : Tahmina Young, jamaal skelton, bobby rodriguez. So [facility] [phone].    ATENCIÓN: Si  habla charo, tiene a middleton disposición servicios gratuitos de asistencia lingüística. Llame al [phone].    We comply with applicable federal civil rights laws and [place] laws. We do not discriminate on the basis of race, color, national origin, age, disability, sex, sexual orientation, or gender identity.            After Visit Summary       This is your record. Keep this with you and show to your community pharmacist(s) and doctor(s) at your next visit.

## 2018-12-05 NOTE — DISCHARGE INSTRUCTIONS
Self-Care for Sore Throats    Sore throats happen for many reasons, such as colds, allergies, and infections caused by viruses or bacteria. In any case, your throat becomes red and sore. Your goal for self-care is to reduce your discomfort while giving your throat a chance to heal.  Moisten and soothe your throat  Tips include the following:    Try a sip of water first thing after waking up.    Keep your throat moist by drinking 6 or more glasses of clear liquids every day.    Run a cool-air humidifier in your room overnight.    Avoid cigarette smoke.     Suck on throat lozenges, cough drops, hard candy, ice chips, or frozen fruit-juice bars. Use the sugar-free versions if your diet or medical condition requires them.  Gargle to ease irritation  Gargling every hour or 2 can ease irritation. Try gargling with 1 of these solutions:    1/4 teaspoon of salt in 1/2 cup of warm water    An over-the-counter anesthetic gargle  Use medicine for more relief  Over-the-counter medicine can reduce sore throat symptoms. Ask your pharmacist if you have questions about which medicine to use:    Ease pain with anesthetic sprays. Aspirin or an aspirin substitute also helps. Remember, never give aspirin to anyone 18 or younger, or if you are already taking blood thinners.     For sore throats caused by allergies, try antihistamines to block the allergic reaction.    Remember: unless a sore throat is caused by a bacterial infection, antibiotics won t help you.  Prevent future sore throats  Prevention tips include the following:    Stop smoking or reduce contact with secondhand smoke. Smoke irritates the tender throat lining.    Limit contact with pets and with allergy-causing substances, such as pollen and mold.    When you re around someone with a sore throat or cold, wash your hands often to keep viruses or bacteria from spreading.    Don t strain your vocal cords.  Call your healthcare provider  Contact your healthcare provider if  you have:    A temperature over 101 F (38.3 C)    White spots on the throat    Great difficulty swallowing    Trouble breathing    A skin rash    Recent exposure to someone else with strep bacteria    Severe hoarseness and swollen glands in the neck or jaw   Date Last Reviewed: 8/1/2016 2000-2018 [employer]. [address] [place] [address]. All rights reserved. This information is not intended as a substitute for professional medical care. Always follow your healthcare professional's instructions.

## 2018-12-05 NOTE — ED PROVIDER NOTES
History     Chief Complaint   Patient presents with     Pharyngitis     HPI  SUBJECTIVE: Margot Kendrick  is here today because of:Sore Throat  The patient has had symptoms of cough, sore throat, headache and fatigue.   Onset of symptoms was 1 day ago. Course of illness is worsening.  Patient admits to exposure to illness at home with son diagnosed with strep 5 days ago.   Patient denies fever, earache, nasal congestion/runny nose, vomiting, diarrhea, sinus pain, chest congestion, wheezing, myalgias and chest pain, abdominal pain, dysuria  Treatment measures tried include aleve.  Patient is not a smoker but an e cig user    Problem List:    Patient Active Problem List    Diagnosis Date Noted     Fibromyalgia 08/02/2013     Priority: High     Insomnia 04/25/2011     Priority: Medium     CARDIOVASCULAR SCREENING; LDL GOAL LESS THAN 160 10/31/2010     Priority: Medium     Vitamin D deficiency 03/25/2010     Priority: Medium     Migraine headache 12/16/2009     Priority: Medium     (Problem list name updated by automated process. Provider to review and confirm.)       FAMILY HISTORY OF BLOOD DISORDER-NONSPEC 02/11/2009     Priority: Medium     pernicious anemia       Chiari malformation type I (H) 12/08/2008     Priority: Medium     Evalutated by neurology and neurosurgery, and patient has declined surgery.       Tobacco abuse 08/12/2008     Priority: Medium     Esophageal reflux 11/30/2005     Priority: Medium     GERD       Sensorineural hearing loss 11/30/2005     Priority: Medium     Problem list name updated by automated process. Provider to review       Abdominal pain, generalized 02/15/2005     Priority: Medium     Irritable bowel syndrome 02/15/2005     Priority: Medium        Past Medical History:    Past Medical History:   Diagnosis Date     Arthritis      Esophageal reflux      Migraine, unspecified, without mention of intractable migraine without mention of status migrainosus      Mitral valve  disorders(424.0)      Myalgia and myositis, unspecified        Past Surgical History:    Past Surgical History:   Procedure Laterality Date     ABDOMEN SURGERY  2005    Right Sanjiv-Collectomy     APPENDECTOMY  2005    With collectomy     COLONOSCOPY       COLONOSCOPY  2012    Procedure: COLONOSCOPY;  Colonoscopy;  Surgeon: Jun Reddy MD;  Location: [facility]     COLONOSCOPY N/A 2/15/2016    Procedure: COLONOSCOPY;  Surgeon: Anson Sethi MD;  Location: [facility]     ENT SURGERY       ENT SURGERY      Sinus     ESOPHAGOSCOPY, GASTROSCOPY, DUODENOSCOPY (EGD), COMBINED N/A 10/8/2015    Procedure: COMBINED ESOPHAGOSCOPY, GASTROSCOPY, DUODENOSCOPY (EGD), BIOPSY SINGLE OR MULTIPLE;  Surgeon: Anson Sethi MD;  Location: [facility]     EYE SURGERY      Recurrent corneal erosion     HYSTERECTOMY, VAGINAL  2002     LAPAROSCOPIC CHOLECYSTECTOMY N/A 10/27/2015    Procedure: LAPAROSCOPIC CHOLECYSTECTOMY;  Surgeon: Anson Sethi MD;  Location: [facility]     ORTHOPEDIC SURGERY       ORTHOPEDIC SURGERY      Left shoulder/Right Shoulder/Right Wrist x 3     SURGICAL HISTORY OF -   ,,         SURGICAL HISTORY OF -   1993    Excision (L) ganglion cyst     SURGICAL HISTORY OF -   1993    Tubal ligation     SURGICAL HISTORY OF -   1998    Septoplasty/sinus surgery     SURGICAL HISTORY OF -       TVH     SURGICAL HISTORY OF -   2004/ /15/2005    Colonoscopy     SURGICAL HISTORY OF -   2001    Gastroscopy     SURGICAL HISTORY OF -       Septoplasty     SURGICAL HISTORY OF -       rt sanjiv colectomy     SURGICAL HISTORY OF -       Open distal clavicle resection with subacromial decompression     SURGICAL HISTORY OF -   2008    rt wrist TFCC     SURGICAL HISTORY OF -   2008    rt wrist removal of scar tissue     SURGICAL HISTORY OF -   2009    Arthroscopic subacromial decompression with introduction of On-Q pain pump.        Family  History:    Family History   Problem Relation Age of Onset     C.A.D. Maternal Grandmother      Arthritis Maternal Grandmother      rheumatoid arthritis     Diabetes Maternal Grandmother      Cardiovascular Maternal Grandfather      Coronary Artery Disease Maternal Grandfather      Cardiovascular Paternal Grandmother      Cardiovascular Paternal Grandfather      Respiratory Daughter      ASTHMA     Neurologic Disorder Father      FIBROMYALGIA     Heart Disease Father 68     C.A.D. Father 68     MI     Osteoporosis Father      Neurologic Disorder Brother      FIBROMYALGIA     Neurologic Disorder Sister      FIBROMYALGIA     Anxiety Disorder Sister      Thyroid Disease Mother      Hypertension Mother      Hyperlipidemia Mother      Arthritis Maternal Aunt      psoriatic     Diabetes Other      Maternal Aunt     Coronary Artery Disease Other      Maternal Aunt     Hypertension Other      Maternal Aunt     Hyperlipidemia Other      Maternal Aunt     Melanoma No family hx of        Social History:  Marital Status:   [2]  Social History   Substance Use Topics     Smoking status: Former Smoker     Packs/day: 0.50     Types: Cigarettes     Smokeless tobacco: Never Used      Comment: quit plan offered     Alcohol use No        Medications:      ADVIL 200 MG OR TABS   aluminum chloride (DRYSOL) 20 % external solution   benzonatate (TESSALON) 200 MG capsule   cyclobenzaprine (FLEXERIL) 10 MG tablet   LORazepam (ATIVAN) 0.5 MG tablet   meloxicam (MOBIC) 7.5 MG tablet   naproxen sodium (ALEVE) 220 MG capsule   SUMAtriptan (IMITREX) 50 MG tablet   temazepam (RESTORIL) 15 MG capsule   traMADol (ULTRAM) 50 MG tablet         Review of Systems  As mentioned above in the history present illness. All other systems were reviewed and are negative.    Physical Exam   BP: 123/77  Heart Rate: 97  Temp: 99  F (37.2  C)  Resp: 16  SpO2: 98 %      Physical Exam  GENERAL: alert, no acute distress and no apparent distress  EYES:  Right  conjunctiva is not injected and without discharge.  Left conjunctiva is not injected and without discharge.  EARS: Right TM is gray, hearing aides and scarred.  Left TM is normal: no effusions, no erythema, and normal landmarks, gray, hearing aides and scarred.  NOSE: Nasal mucosa is discharge clear and inflamed.  Sinus not tender.  THROAT: red/erythematous posterior pharynx and exudate absent, uvula midline.  NECK: supple with shotty nodes  CARDIAC:NORMAL - regular rate and rhythm without murmur.  RESP: Normal - CTA without rales, rhonchi, or wheezing.  SKIN: normal    ED Course     ED Course     Procedures      Results for orders placed or performed during the hospital encounter of 12/05/18 (from the past 24 hour(s))   Rapid strep group A screen POCT   Result Value Ref Range    Rapid Strep A Screen negative neg    Internal QC OK Yes        Medications - No data to display    Assessments & Plan (with Medical Decision Making)     I have reviewed the nursing notes.    I have reviewed the findings, diagnosis, plan and need for follow up with the patient.  Medical Decision Making:  CXR is not indicated.  Rapid Strep test is indicated.     Assessment:  1) Viral pharyngitis.    PLAN:    Use aleve, Cepacol lozenges, increase fluids and rest.   Follow up with any questions or problems      Discharge Medication List as of 12/5/2018  3:56 PM          Final diagnoses:   Viral pharyngitis       12/5/2018   [facility] EMERGENCY DEPARTMENT     Rosa Vaca APRN CNP  12/05/18 [id number]       Rosa Vaca APRN CNP  12/05/18 [id number]

## 2018-12-05 NOTE — ED AVS SNAPSHOT
[facility]    [address] [facility] [address]    Phone:  [phone]    Fax:  [phone]                                       Margot Kendrick   MRN: [id number]    Department:  [facility]   Date of Visit:  12/5/2018           After Visit Summary Signature Page     I have received my discharge instructions, and my questions have been answered. I have discussed any challenges I see with this plan with the nurse or doctor.    ..........................................................................................................................................  Patient/Patient Representative Signature      ..........................................................................................................................................  Patient Representative Print Name and Relationship to Patient    ..................................................               ................................................  Date                                   Time    ..........................................................................................................................................  Reviewed by Signature/Title    ...................................................              ..............................................  Date                                               Time          22EPIC Rev 08/18

## 2018-12-07 LAB
BACTERIA SPEC CULT: NORMAL
Lab: NORMAL
SPECIMEN SOURCE: NORMAL

## 2018-12-12 ENCOUNTER — TELEPHONE (OUTPATIENT)
Dept: FAMILY MEDICINE | Facility: CLINIC | Age: 53
End: 2018-12-12

## 2018-12-12 ENCOUNTER — OFFICE VISIT (OUTPATIENT)
Dept: FAMILY MEDICINE | Facility: CLINIC | Age: 53
End: 2018-12-12
Payer: COMMERCIAL

## 2018-12-12 VITALS
HEART RATE: 86 BPM | HEIGHT: 65 IN | DIASTOLIC BLOOD PRESSURE: 64 MMHG | BODY MASS INDEX: 26.92 KG/M2 | WEIGHT: 161.6 LBS | OXYGEN SATURATION: 98 % | TEMPERATURE: 99.1 F | SYSTOLIC BLOOD PRESSURE: 124 MMHG | RESPIRATION RATE: 16 BRPM

## 2018-12-12 DIAGNOSIS — R09.82 POST-NASAL DRAINAGE: ICD-10-CM

## 2018-12-12 DIAGNOSIS — J03.90 TONSILLITIS: Primary | ICD-10-CM

## 2018-12-12 PROCEDURE — 99213 OFFICE O/P EST LOW 20 MIN: CPT | Performed by: NURSE PRACTITIONER

## 2018-12-12 RX ORDER — AMOXICILLIN 500 MG/1
500 CAPSULE ORAL 2 TIMES DAILY
Qty: 20 CAPSULE | Refills: 0 | Status: SHIPPED | OUTPATIENT
Start: 2018-12-12 | End: 2019-03-25

## 2018-12-12 RX ORDER — FLUTICASONE PROPIONATE 50 MCG
1 SPRAY, SUSPENSION (ML) NASAL 2 TIMES DAILY
Qty: 1 BOTTLE | Refills: 1 | Status: SHIPPED | OUTPATIENT
Start: 2018-12-12 | End: 2019-05-18

## 2018-12-12 ASSESSMENT — MIFFLIN-ST. JEOR: SCORE: 1348.89

## 2018-12-12 NOTE — PATIENT INSTRUCTIONS
Patient Education     Pharyngitis (Sore Throat), Report Pending    Pharyngitis (sore throat) is often due to a virus. It can also be caused by streptococcus (strep), bacteria. This is often called strep throat. Both viral and strep infections can cause throat pain that is worse when swallowing, aching all over, headache, and fever. Both types of infections are contagious. They may be spread by coughing, kissing, or touching others after touching your mouth or nose.  A test has been done to find out if you or your child have strep throat. Call this facility or your healthcare provider if you were not given your test results. If the test is positive for strep infection, you will need to take antibiotic medicines. A prescription can be called into your pharmacy at that time. If the test is negative, you probably have a viral pharyngitis. This does not need to be treated with antibiotics. Until you receive the results of the strep test, you should stay home from work. If your child is being tested, he or she should stay home from school.  Home care    Rest at home. Drink plenty of fluids so you won't get dehydrated.    If the test is positive for strep, you or your child should not go to work or school for the first 2 days of taking the antibiotics. After this time, you or your child will not be contagious. You or your child can then return to work or school when feeling better.     Use the antibiotic medicine for the full 10 days. Do not stop the medicine even if you or your child feel better. This is very important to make sure the infection is fully treated. It is also important to prevent medicine-resistant germs from growing. If you or your child were given an antibiotic shot, no more antibiotics are needed.    Use throat lozenges or numbing throat sprays to help reduce pain. Gargling with warm salt water will also help reduce throat pain. Dissolve 1/2 teaspoon of salt in 1 glass of warm water. Children can sip  on juice or a popsicle. Children 5 years and older can also suck on a lollipop or hard candy.    Don't eat salty or spicy foods or give them to your child. These can irritate the throat.  Other medicine for a child: You can give your child acetaminophen for fever, fussiness, or discomfort. In babies over 6 months of age, you may use ibuprofen instead of acetaminophen. If your child has chronic liver or kidney disease or ever had a stomach ulcer or GI bleeding, talk with your child s healthcare provider before giving these medicines. Aspirin should never be used by any child under 18 years of age who has a fever. It may cause severe liver damage.  Other medicine for an adult: You may use acetaminophen or ibuprofen to control pain or fever, unless another medicine was prescribed for this. If you have chronic liver or kidney disease or ever had a stomach ulcer or GI bleeding, talk with your healthcare provider before using these medicines.  Follow-up care  Follow up with your healthcare provider or our staff if you or your child don't get better over the next week.  When to seek medical advice  Call your healthcare provider right away if any of these occur:    Fever as directed by your healthcare provider. For children, seek care if:  ? Your child is of any age and has repeated fevers above 104 F (40 C).  ? Your child is younger than 2 years of age and has a fever of 100.4 F (38 C) for more than 1 day.  ? Your child is 2 years old or older and has a fever of 100.4 F (38 C) for more than 3 days.    New or worsening ear pain, sinus pain, or headache    Painful lumps in the back of neck    Stiff neck    Lymph nodes are getting larger     Can t swallow liquids, a lot of drooling, or can t open mouth wide due to throat pain    Signs of dehydration, such as very dark urine or no urine, sunken eyes, dizziness    Trouble breathing or noisy breathing    Muffled voice    New rash    Other symptoms getting worse  Prevention  Here  are steps you can take to help prevent an infection:    Keep good hand washing habits.    Don t have close contact with people who have sore throats, colds, or other upper respiratory infections.    Don t smoke, and stay away from secondhand smoke.    Stay up to date with of your vaccines.  Date Last Reviewed: 11/1/2017 2000-2018 [employer]. [address] [place] [address]. All rights reserved. This information is not intended as a substitute for professional medical care. Always follow your healthcare professional's instructions.           Patient Education     Earache, No Infection (Adult)  Earaches can happen without an infection. This occurs when air and fluid build up behind the eardrum causing a feeling of fullness and discomfort and reduced hearing. This is called otitis media with effusion (OME) or serous otitis media. It means there is fluid in the middle ear. It is not the same as acute otitis media, which is typically from infection.  OME can happen when you have a cold if congestion blocks the passage that drains the middle ear. This passage is called the eustachian tube. OME may also occur with nasal allergies or after a bacterial middle ear infection.    The pain or discomfort may come and go. You may hear clicking or popping sounds when you chew or swallow. You may feel that your balance is off. Or you may hear ringing in the ear.  It often takes from several weeks up to 3 months for the fluid to clear on its own. Oral pain relievers and ear drops help if there is pain. Decongestants and antihistamines sometimes help. Antibiotics don't help since there is no infection. Your doctor may prescribe a nasal spray to help reduce swelling in the nose and eustachian tube. This can allow the ear to drain.  If your OME doesn't improve after 3 months, surgery may be used to drain the fluid and insert a small tube in the eardrum to allow continued drainage.  Because the middle ear  fluid can become infected, it is important to watch for signs of an ear infection which may develop later. These signs include increased ear pain, fever, or drainage from the ear.  Home care  The following guidelines will help you care for yourself at home:    You may use over-the-counter medicine as directed to control pain, unless another medicine was prescribed. If you have chronic liver or kidney disease or ever had a stomach ulcer or GI bleeding, talk with your doctor before using these medicines. Aspirin should never be used in anyone under 18 years of age who is ill with a fever. It may cause severe liver damage.    You may use over-the-counter decongestants such as phenylephrine or pseudoephedrine. But they are not always helpful. Don't use nasal spray decongestants more than 3 days. Longer use can make congestion worse. Prescription nasal sprays from your doctor don't typically have those restrictions.    Antihistamines may help if you are also having allergy symptoms.    You may use medicines such as guaifenesin to thin mucus and promote drainage.  Follow-up care  Follow up with your healthcare provider or as advised if you are not feeling better after 3 days.  When to seek medical advice  Call your healthcare provider right away if any of the following occur:    Your ear pain gets worse or does not start to improve     Fever of 100.4 F (38 C) or higher, or as directed by your healthcare provider    Fluid or blood draining from the ear    Headache or sinus pain    Stiff neck    Unusual drowsiness or confusion  Date Last Reviewed: 10/1/2016    2290-5330 [employer]. [address] [place] [address], [place] [id number]. All rights reserved. This information is not intended as a substitute for professional medical care. Always follow your healthcare professional's instructions.

## 2018-12-12 NOTE — TELEPHONE ENCOUNTER
Pt called. States she has a sore throat and swollen glands for 10 days. States she also has cough and runny nose. Post nasal drip. Voice course. Appointment made for today. Niya Dawson RN

## 2018-12-12 NOTE — PROGRESS NOTES
SUBJECTIVE:   Margot Kendrick is a 53 year old female who presents to clinic today for the following health issues:      ENT Symptoms             Symptoms: cc Present Absent Comment   Fever/Chills   x    Fatigue  x     Muscle Aches  x  Initially, lasting 2 days   Eye Irritation   x    Sneezing  x     Nasal Chadd/Drg  x  Drainage - post nasal   Sinus Pressure/Pain   x    Loss of smell   x    Dental pain   x    Sore Throat x x  Voice hoarse, food getting stuck in throat, severe pain with swallowing on both sides of throat    Swollen Glands  x     Ear Pain/Fullness  x  Left ear feels like there is fluid in it    Cough x x  Nonproductive at this time, was in the beginning.   Wheeze   x    Chest Pain  x  Tightness, mild   Shortness of breath  x  Has improved    Rash   x    Other         Symptom duration:  10 days    Symptom severity:  cold-mild, throat and swollen glands-more moderate to severe   Treatments tried:  mucinex 12 hour 2x a day, aleve, airborne, zycam, decongestant   Contacts:  son had strep 2 weeks ago, had RST done a couple weeks ago that was negative. Symptoms started around the time son started treatment for strep.       Problem list and histories reviewed & adjusted, as indicated.  Additional history: as documented    Patient Active Problem List   Diagnosis     Abdominal pain, generalized     Irritable bowel syndrome     Esophageal reflux     Sensorineural hearing loss     Tobacco abuse     Chiari malformation type I (H)     FAMILY HISTORY OF BLOOD DISORDER-NONSPEC     Migraine headache     Vitamin D deficiency     CARDIOVASCULAR SCREENING; LDL GOAL LESS THAN 160     Insomnia     Fibromyalgia     Past Surgical History:   Procedure Laterality Date     ABDOMEN SURGERY  2005    Right Sanjiv-Collectomy     APPENDECTOMY  2005    With collectomy     COLONOSCOPY       COLONOSCOPY  6/11/2012    Procedure: COLONOSCOPY;  Colonoscopy;  Surgeon: Jun Reddy MD;  Location: [facility]     COLONOSCOPY  N/A 2/15/2016    Procedure: COLONOSCOPY;  Surgeon: Anson Sethi MD;  Location: [facility]     ENT SURGERY       ENT SURGERY      Sinus     ESOPHAGOSCOPY, GASTROSCOPY, DUODENOSCOPY (EGD), COMBINED N/A 10/8/2015    Procedure: COMBINED ESOPHAGOSCOPY, GASTROSCOPY, DUODENOSCOPY (EGD), BIOPSY SINGLE OR MULTIPLE;  Surgeon: Anson Sethi MD;  Location: [facility]     EYE SURGERY      Recurrent corneal erosion     HYSTERECTOMY, VAGINAL  2002     LAPAROSCOPIC CHOLECYSTECTOMY N/A 10/27/2015    Procedure: LAPAROSCOPIC CHOLECYSTECTOMY;  Surgeon: Anson Sethi MD;  Location: [facility]     ORTHOPEDIC SURGERY       ORTHOPEDIC SURGERY      Left shoulder/Right Shoulder/Right Wrist x 3     SURGICAL HISTORY OF -   ,,         SURGICAL HISTORY OF -   1993    Excision (L) ganglion cyst     SURGICAL HISTORY OF -   1993    Tubal ligation     SURGICAL HISTORY OF -   1998    Septoplasty/sinus surgery     SURGICAL HISTORY OF -       TVH     SURGICAL HISTORY OF -   2004/ /15/2005    Colonoscopy     SURGICAL HISTORY OF -   2001    Gastroscopy     SURGICAL HISTORY OF -       Septoplasty     SURGICAL HISTORY OF -       rt craig colectomy     SURGICAL HISTORY OF -       Open distal clavicle resection with subacromial decompression     SURGICAL HISTORY OF -   2008    rt wrist TFCC     SURGICAL HISTORY OF -   2008    rt wrist removal of scar tissue     SURGICAL HISTORY OF -   2009    Arthroscopic subacromial decompression with introduction of On-Q pain pump.        Social History     Tobacco Use     Smoking status: Former Smoker     Packs/day: 0.50     Types: Cigarettes     Smokeless tobacco: Never Used     Tobacco comment: quit plan offered   Substance Use Topics     Alcohol use: No     Alcohol/week: 0.0 oz     Family History   Problem Relation Age of Onset     C.A.D. Maternal Grandmother      Arthritis Maternal Grandmother         rheumatoid arthritis      "Diabetes Maternal Grandmother      Cardiovascular Maternal Grandfather      Coronary Artery Disease Maternal Grandfather      Cardiovascular Paternal Grandmother      Cardiovascular Paternal Grandfather      Respiratory Daughter         ASTHMA     Neurologic Disorder Father         FIBROMYALGIA     Heart Disease Father 68     C.A.D. Father 68        MI     Osteoporosis Father      Neurologic Disorder Brother         FIBROMYALGIA     Neurologic Disorder Sister         FIBROMYALGIA     Anxiety Disorder Sister      Thyroid Disease Mother      Hypertension Mother      Hyperlipidemia Mother      Arthritis Maternal Aunt         psoriatic     Diabetes Other         Maternal Aunt     Coronary Artery Disease Other         Maternal Aunt     Hypertension Other         Maternal Aunt     Hyperlipidemia Other         Maternal Aunt     Melanoma No family hx of            Reviewed and updated as needed this visit by clinical staff       Reviewed and updated as needed this visit by Provider         ROS:  Constitutional, HEENT, cardiovascular, pulmonary, gi and gu systems are negative, except as otherwise noted.    OBJECTIVE:     /64 (BP Location: Right arm, Patient Position: Sitting, Cuff Size: Adult Regular)   Pulse 86   Temp 99.1  F (37.3  C) (Tympanic)   Resp 16   Ht 1.651 m (5' 5\")   Wt 73.3 kg (161 lb 9.6 oz)   SpO2 98%   BMI 26.89 kg/m    Body mass index is 26.89 kg/m .  GENERAL: healthy, alert and no distress  EYES: Eyes grossly normal to inspection, PERRL and conjunctivae and sclerae normal  HENT: normal cephalic/atraumatic, both ears: clear effusion, nose and mouth without ulcers or lesions, oropharynx clear, oral mucous membranes moist, tonsillar erythema and sinuses: not tender  NECK: bilateral anterior cervical adenopathy and no asymmetry, masses, or scars  RESP: lungs clear to auscultation - no rales, rhonchi or wheezes  CV: regular rates and rhythm, normal S1 S2, no S3 or S4 and no murmur, click or " rub  SKIN: no suspicious lesions or rashes  NEURO: Normal strength and tone, mentation intact and speech normal    Diagnostic Test Results:  none     ASSESSMENT/PLAN:       ICD-10-CM    1. Tonsillitis J03.90 amoxicillin (AMOXIL) 500 MG capsule   2. Post-nasal drainage R09.82 fluticasone (FLONASE) 50 MCG/ACT nasal spray       FUTURE APPOINTMENTS:       - Follow up in 1 week for persistent symptoms, sooner for new or worsening symptoms.     Patient Instructions     Patient Education     Pharyngitis (Sore Throat), Report Pending    Pharyngitis (sore throat) is often due to a virus. It can also be caused by streptococcus (strep), bacteria. This is often called strep throat. Both viral and strep infections can cause throat pain that is worse when swallowing, aching all over, headache, and fever. Both types of infections are contagious. They may be spread by coughing, kissing, or touching others after touching your mouth or nose.  A test has been done to find out if you or your child have strep throat. Call this facility or your healthcare provider if you were not given your test results. If the test is positive for strep infection, you will need to take antibiotic medicines. A prescription can be called into your pharmacy at that time. If the test is negative, you probably have a viral pharyngitis. This does not need to be treated with antibiotics. Until you receive the results of the strep test, you should stay home from work. If your child is being tested, he or she should stay home from school.  Home care    Rest at home. Drink plenty of fluids so you won't get dehydrated.    If the test is positive for strep, you or your child should not go to work or school for the first 2 days of taking the antibiotics. After this time, you or your child will not be contagious. You or your child can then return to work or school when feeling better.     Use the antibiotic medicine for the full 10 days. Do not stop the medicine even  if you or your child feel better. This is very important to make sure the infection is fully treated. It is also important to prevent medicine-resistant germs from growing. If you or your child were given an antibiotic shot, no more antibiotics are needed.    Use throat lozenges or numbing throat sprays to help reduce pain. Gargling with warm salt water will also help reduce throat pain. Dissolve 1/2 teaspoon of salt in 1 glass of warm water. Children can sip on juice or a popsicle. Children 5 years and older can also suck on a lollipop or hard candy.    Don't eat salty or spicy foods or give them to your child. These can irritate the throat.  Other medicine for a child: You can give your child acetaminophen for fever, fussiness, or discomfort. In babies over 6 months of age, you may use ibuprofen instead of acetaminophen. If your child has chronic liver or kidney disease or ever had a stomach ulcer or GI bleeding, talk with your child s healthcare provider before giving these medicines. Aspirin should never be used by any child under 18 years of age who has a fever. It may cause severe liver damage.  Other medicine for an adult: You may use acetaminophen or ibuprofen to control pain or fever, unless another medicine was prescribed for this. If you have chronic liver or kidney disease or ever had a stomach ulcer or GI bleeding, talk with your healthcare provider before using these medicines.  Follow-up care  Follow up with your healthcare provider or our staff if you or your child don't get better over the next week.  When to seek medical advice  Call your healthcare provider right away if any of these occur:    Fever as directed by your healthcare provider. For children, seek care if:  ? Your child is of any age and has repeated fevers above 104 F (40 C).  ? Your child is younger than 2 years of age and has a fever of 100.4 F (38 C) for more than 1 day.  ? Your child is 2 years old or older and has a fever of  100.4 F (38 C) for more than 3 days.    New or worsening ear pain, sinus pain, or headache    Painful lumps in the back of neck    Stiff neck    Lymph nodes are getting larger     Can t swallow liquids, a lot of drooling, or can t open mouth wide due to throat pain    Signs of dehydration, such as very dark urine or no urine, sunken eyes, dizziness    Trouble breathing or noisy breathing    Muffled voice    New rash    Other symptoms getting worse  Prevention  Here are steps you can take to help prevent an infection:    Keep good hand washing habits.    Don t have close contact with people who have sore throats, colds, or other upper respiratory infections.    Don t smoke, and stay away from secondhand smoke.    Stay up to date with of your vaccines.  Date Last Reviewed: 11/1/2017 2000-2018 [employer]. [address] [place] [address]. All rights reserved. This information is not intended as a substitute for professional medical care. Always follow your healthcare professional's instructions.           Patient Education     Earache, No Infection (Adult)  Earaches can happen without an infection. This occurs when air and fluid build up behind the eardrum causing a feeling of fullness and discomfort and reduced hearing. This is called otitis media with effusion (OME) or serous otitis media. It means there is fluid in the middle ear. It is not the same as acute otitis media, which is typically from infection.  OME can happen when you have a cold if congestion blocks the passage that drains the middle ear. This passage is called the eustachian tube. OME may also occur with nasal allergies or after a bacterial middle ear infection.    The pain or discomfort may come and go. You may hear clicking or popping sounds when you chew or swallow. You may feel that your balance is off. Or you may hear ringing in the ear.  It often takes from several weeks up to 3 months for the fluid to clear on its  own. Oral pain relievers and ear drops help if there is pain. Decongestants and antihistamines sometimes help. Antibiotics don't help since there is no infection. Your doctor may prescribe a nasal spray to help reduce swelling in the nose and eustachian tube. This can allow the ear to drain.  If your OME doesn't improve after 3 months, surgery may be used to drain the fluid and insert a small tube in the eardrum to allow continued drainage.  Because the middle ear fluid can become infected, it is important to watch for signs of an ear infection which may develop later. These signs include increased ear pain, fever, or drainage from the ear.  Home care  The following guidelines will help you care for yourself at home:    You may use over-the-counter medicine as directed to control pain, unless another medicine was prescribed. If you have chronic liver or kidney disease or ever had a stomach ulcer or GI bleeding, talk with your doctor before using these medicines. Aspirin should never be used in anyone under 18 years of age who is ill with a fever. It may cause severe liver damage.    You may use over-the-counter decongestants such as phenylephrine or pseudoephedrine. But they are not always helpful. Don't use nasal spray decongestants more than 3 days. Longer use can make congestion worse. Prescription nasal sprays from your doctor don't typically have those restrictions.    Antihistamines may help if you are also having allergy symptoms.    You may use medicines such as guaifenesin to thin mucus and promote drainage.  Follow-up care  Follow up with your healthcare provider or as advised if you are not feeling better after 3 days.  When to seek medical advice  Call your healthcare provider right away if any of the following occur:    Your ear pain gets worse or does not start to improve     Fever of 100.4 F (38 C) or higher, or as directed by your healthcare provider    Fluid or blood draining from the  ear    Headache or sinus pain    Stiff neck    Unusual drowsiness or confusion  Date Last Reviewed: 10/1/2016    7576-0924 [employer]. [address] [place] [address], [place] [address]. All rights reserved. This information is not intended as a substitute for professional medical care. Always follow your healthcare professional's instructions.               [employer]

## 2019-02-27 ENCOUNTER — MYC MEDICAL ADVICE (OUTPATIENT)
Dept: FAMILY MEDICINE | Facility: CLINIC | Age: 54
End: 2019-02-27

## 2019-02-27 ENCOUNTER — MYC REFILL (OUTPATIENT)
Dept: FAMILY MEDICINE | Facility: CLINIC | Age: 54
End: 2019-02-27

## 2019-02-27 DIAGNOSIS — M79.7 FIBROMYALGIA: ICD-10-CM

## 2019-02-27 DIAGNOSIS — F51.01 PRIMARY INSOMNIA: ICD-10-CM

## 2019-02-27 DIAGNOSIS — F41.9 ANXIETY: Primary | ICD-10-CM

## 2019-02-28 NOTE — TELEPHONE ENCOUNTER
"Requested Prescriptions   Pending Prescriptions Disp Refills     LORazepam (ATIVAN) 0.5 MG tablet 30 tablet 0     Sig: Take 1 tablet (0.5 mg) by mouth daily For panic or for flying    There is no refill protocol information for this order        meloxicam (MOBIC) 7.5 MG tablet 30 tablet 5     Sig: Take 1 tablet (7.5 mg) by mouth daily    NSAID Medications Passed - 2/28/2019  7:42 AM       Passed - Blood pressure under 140/90 in past 12 months    BP Readings from Last 3 Encounters:   12/12/18 124/64   12/05/18 123/77   05/23/18 126/58                Passed - Normal ALT on file in past 12 months    Recent Labs   Lab Test 04/06/18  0928   ALT 25            Passed - Normal AST on file in past 12 months    Recent Labs   Lab Test 04/06/18  0928   AST 18            Passed - Recent (12 mo) or future (30 days) visit within the authorizing provider's specialty    Patient had office visit in the last 12 months or has a visit in the next 30 days with authorizing provider or within the authorizing provider's specialty.  See \"Patient Info\" tab in inbasket, or \"Choose Columns\" in Meds & Orders section of the refill encounter.             Passed - Patient is age 6-64 years       Passed - Normal CBC on file in past 12 months    Recent Labs   Lab Test 04/06/18  0928   WBC 7.2   RBC 4.54   HGB 13.8   HCT 42.1                   Passed - Medication is active on med list       Passed - No active pregnancy on record       Passed - Normal serum creatinine on file in past 12 months    Recent Labs   Lab Test 04/06/18  0928   CR 0.76            Passed - No positive pregnancy test in past 12 months        temazepam (RESTORIL) 15 MG capsule 30 capsule 5    There is no refill protocol information for this order          "

## 2019-03-01 RX ORDER — LORAZEPAM 0.5 MG/1
0.5 TABLET ORAL DAILY
Qty: 30 TABLET | Refills: 0 | Status: SHIPPED | OUTPATIENT
Start: 2019-03-01 | End: 2020-02-28

## 2019-03-01 RX ORDER — MELOXICAM 7.5 MG/1
7.5 TABLET ORAL DAILY
Qty: 30 TABLET | Refills: 5 | Status: SHIPPED | OUTPATIENT
Start: 2019-03-01 | End: 2020-02-28

## 2019-03-01 RX ORDER — TEMAZEPAM 15 MG/1
15 CAPSULE ORAL
Qty: 30 CAPSULE | Refills: 5 | Status: SHIPPED | OUTPATIENT
Start: 2019-03-01 | End: 2019-09-09

## 2019-03-25 ENCOUNTER — OFFICE VISIT (OUTPATIENT)
Dept: FAMILY MEDICINE | Facility: CLINIC | Age: 54
End: 2019-03-25
Payer: COMMERCIAL

## 2019-03-25 VITALS
HEIGHT: 65 IN | SYSTOLIC BLOOD PRESSURE: 126 MMHG | DIASTOLIC BLOOD PRESSURE: 84 MMHG | HEART RATE: 93 BPM | OXYGEN SATURATION: 98 % | TEMPERATURE: 99.4 F | RESPIRATION RATE: 14 BRPM | BODY MASS INDEX: 27.49 KG/M2 | WEIGHT: 165 LBS

## 2019-03-25 DIAGNOSIS — M79.7 FIBROMYALGIA: Primary | ICD-10-CM

## 2019-03-25 DIAGNOSIS — L57.0 ACTINIC KERATOSIS: ICD-10-CM

## 2019-03-25 DIAGNOSIS — G44.219 EPISODIC TENSION-TYPE HEADACHE, NOT INTRACTABLE: ICD-10-CM

## 2019-03-25 DIAGNOSIS — G43.119 INTRACTABLE MIGRAINE WITH AURA WITHOUT STATUS MIGRAINOSUS: ICD-10-CM

## 2019-03-25 DIAGNOSIS — Z23 ENCOUNTER FOR IMMUNIZATION: ICD-10-CM

## 2019-03-25 LAB
ANION GAP SERPL CALCULATED.3IONS-SCNC: 4 MMOL/L (ref 3–14)
BUN SERPL-MCNC: 10 MG/DL (ref 7–30)
CALCIUM SERPL-MCNC: 8.9 MG/DL (ref 8.5–10.1)
CHLORIDE SERPL-SCNC: 107 MMOL/L (ref 94–109)
CO2 SERPL-SCNC: 26 MMOL/L (ref 20–32)
CREAT SERPL-MCNC: 0.65 MG/DL (ref 0.52–1.04)
GFR SERPL CREATININE-BSD FRML MDRD: >90 ML/MIN/{1.73_M2}
GLUCOSE SERPL-MCNC: 100 MG/DL (ref 70–99)
POTASSIUM SERPL-SCNC: 3.9 MMOL/L (ref 3.4–5.3)
SODIUM SERPL-SCNC: 137 MMOL/L (ref 133–144)

## 2019-03-25 PROCEDURE — 99214 OFFICE O/P EST MOD 30 MIN: CPT | Mod: 25 | Performed by: FAMILY MEDICINE

## 2019-03-25 PROCEDURE — 36415 COLL VENOUS BLD VENIPUNCTURE: CPT | Performed by: FAMILY MEDICINE

## 2019-03-25 PROCEDURE — 17000 DESTRUCT PREMALG LESION: CPT | Performed by: FAMILY MEDICINE

## 2019-03-25 PROCEDURE — 90471 IMMUNIZATION ADMIN: CPT | Performed by: FAMILY MEDICINE

## 2019-03-25 PROCEDURE — 80048 BASIC METABOLIC PNL TOTAL CA: CPT | Performed by: FAMILY MEDICINE

## 2019-03-25 PROCEDURE — 90714 TD VACC NO PRESV 7 YRS+ IM: CPT | Performed by: FAMILY MEDICINE

## 2019-03-25 RX ORDER — CYCLOBENZAPRINE HCL 10 MG
10 TABLET ORAL
Qty: 30 TABLET | Refills: 11 | Status: SHIPPED | OUTPATIENT
Start: 2019-03-25 | End: 2020-02-28

## 2019-03-25 RX ORDER — SUMATRIPTAN 50 MG/1
50 TABLET, FILM COATED ORAL
Qty: 18 TABLET | Refills: 3 | Status: SHIPPED | OUTPATIENT
Start: 2019-03-25 | End: 2020-02-28

## 2019-03-25 ASSESSMENT — MIFFLIN-ST. JEOR: SCORE: 1364.32

## 2019-03-25 NOTE — NURSING NOTE

## 2019-03-25 NOTE — PATIENT INSTRUCTIONS
[facility] Schedule    [facility] ~ [phone]  One Day weekly- Alternating Days    [place] ~ [phone]  Every other Monday or Wednesday   & one Saturday morning a month    [place] ~ [phone]  Every Other Monday Afternoon    ~ [place] ~   Every other Monday Morning    [place] ~ [phone]  Every Monday morning  Every Tuesday afternoon  Wed, Thurs, Friday morning & afternoon  Updated 06/18/18        Lab today     meds updated     Lesion on the nose frozen if this recurs would recommend dermatology

## 2019-03-25 NOTE — NURSING NOTE
"Chief Complaint   Patient presents with     Pain     Lesion       Initial /84 (BP Location: Right arm, Patient Position: Chair, Cuff Size: Adult Large)   Pulse 93   Temp 99.4  F (37.4  C) (Tympanic)   Resp 14   Ht 1.651 m (5' 5\")   Wt 74.8 kg (165 lb)   SpO2 98%   BMI 27.46 kg/m   Estimated body mass index is 27.46 kg/m  as calculated from the following:    Height as of this encounter: 1.651 m (5' 5\").    Weight as of this encounter: 74.8 kg (165 lb).    Patient presents to the clinic using No DME    Health Maintenance that is potentially due pending provider review:  NONE    n/a    Is there anyone who you would like to be able to receive your results? No  If yes have patient fill out ALLEGRA    "

## 2019-03-25 NOTE — PROGRESS NOTES
SUBJECTIVE:   Margot Kendrick is a 53 year old female who presents to clinic today for the following health issues:      Chronic Pain Follow-Up       Type / Location of Pain: fibromyalgia  Analgesia/pain control:       Recent changes:  same      Overall control: Comfortably manageable  Activity level/function:      Daily activities:  Able to do all daily activities    Work:  Able to work part time without limitations and Pain does not limit any  work  Adverse effects:  No  Adherance    Taking medication as directed?  Yes    Participating in other treatments: no -   Risk Factors:    Sleep:  Fair    Mood/anxiety:  controlled    Recent family or social stressors:  conflict between family members    Other aggravating factors: none  No flowsheet data found.  No flowsheet data found.  Encounter-Level CSA:    There are no encounter-level csa.     Patient-Level CSA:    There are no patient-level csa.         Amount of exercise or physical activity: None    Problems taking medications regularly: No    Medication side effects: headaches from mobic?    Diet: regular (no restrictions)        Lesion      Duration: a couple years    Description (location/character/radiation): right thigh, nose    Intensity:  none    Accompanying signs and symptoms: none    History (similar episodes/previous evaluation): None    Precipitating or alleviating factors: None    Therapies tried and outcome: None       Problem list and histories reviewed & adjusted, as indicated.  Additional history: as documented    Labs reviewed in EPIC    Reviewed and updated as needed this visit by clinical staff  Tobacco  Allergies  Meds  Problems  Med Hx  Surg Hx  Fam Hx  Soc Hx        Reviewed and updated as needed this visit by Provider  Tobacco  Allergies  Meds  Problems  Med Hx  Surg Hx  Fam Hx         ROS:  Constitutional, HEENT, cardiovascular, pulmonary, gi and gu systems are negative, except as otherwise noted.    OBJECTIVE:       "                                              /84 (BP Location: Right arm, Patient Position: Chair, Cuff Size: Adult Large)   Pulse 93   Temp 99.4  F (37.4  C) (Tympanic)   Resp 14   Ht 1.651 m (5' 5\")   Wt 74.8 kg (165 lb)   SpO2 98%   BMI 27.46 kg/m     Body mass index is 27.46 kg/m .  GENERAL APPEARANCE: healthy, alert and no distress  RESP: lungs clear to auscultation - no rales, rhonchi or wheezes  CV: regular rates and rhythm, normal S1 S2, no S3 or S4 and no murmur, click or rub  MS: extremities normal- no gross deformities noted  SKIN: flat 7mm light brown mole right anterior thigh   Rough raised lesion left side of nose c/w AK    Treated with cryotherapy in freezed thaw x2      PSYCH: mentation appears normal and affect normal/bright         ASSESSMENT/PLAN:                                                    1. Fibromyalgia  Stable no change in treatment plan.   - cyclobenzaprine (FLEXERIL) 10 MG tablet; Take 1 tablet (10 mg) by mouth nightly as needed for muscle spasms  Dispense: 30 tablet; Refill: 11  - Basic metabolic panel    2. Intractable migraine with aura without status migrainosus  Stable no change in treatment plan.   - SUMAtriptan (IMITREX) 50 MG tablet; Take 1 tablet (50 mg) by mouth at onset of headache for migraine May repeat dose in 2 hours.  Do not exceed 200 mg in 24 hours  Dispense: 18 tablet; Refill: 3    3. Episodic tension-type headache, not intractable  Stable no change in treatment plan.   - cyclobenzaprine (FLEXERIL) 10 MG tablet; Take 1 tablet (10 mg) by mouth nightly as needed for muscle spasms  Dispense: 30 tablet; Refill: 11    4. Actinic keratosis  If recurrs to derm   - DESTRUCT BENIGN LESION, UP TO 14    5. Encounter for immunization    - TD PRESERV FREE, IM (7+ YRS)  - ADMIN 1st VACCINE      Patient Instructions   [facility] Schedule    [facility] ~ [phone]  One Day weekly- Alternating Days    [place] ~ [phone]  Every other Monday or " Wednesday   & one Saturday morning a month    [place] ~ [phone]  Every Other Monday Afternoon    ~ [place] ~   Every other Monday Morning    [place] ~ [phone]  Every Monday morning  Every Tuesday afternoon  Wed, Thurs, Friday morning & afternoon  Updated 06/18/18        Lab today     meds updated     Lesion on the nose frozen if this recurs would recommend dermatology         Risks, benefits, side effects and rationale for treatment plan fully discussed with the patient and understanding expressed.   Lakeisha Haley MD  [facility]

## 2019-05-18 ENCOUNTER — NURSE TRIAGE (OUTPATIENT)
Dept: NURSING | Facility: CLINIC | Age: 54
End: 2019-05-18

## 2019-05-18 ENCOUNTER — OFFICE VISIT (OUTPATIENT)
Dept: URGENT CARE | Facility: URGENT CARE | Age: 54
End: 2019-05-18
Payer: COMMERCIAL

## 2019-05-18 VITALS
SYSTOLIC BLOOD PRESSURE: 122 MMHG | TEMPERATURE: 98.7 F | DIASTOLIC BLOOD PRESSURE: 60 MMHG | HEART RATE: 75 BPM | RESPIRATION RATE: 16 BRPM | WEIGHT: 163 LBS | BODY MASS INDEX: 27.12 KG/M2 | OXYGEN SATURATION: 97 %

## 2019-05-18 DIAGNOSIS — R04.0 EPISTAXIS: ICD-10-CM

## 2019-05-18 DIAGNOSIS — Z91.09 ENVIRONMENTAL ALLERGIES: ICD-10-CM

## 2019-05-18 DIAGNOSIS — J01.90 ACUTE SINUSITIS, RECURRENCE NOT SPECIFIED, UNSPECIFIED LOCATION: Primary | ICD-10-CM

## 2019-05-18 DIAGNOSIS — R09.82 POST-NASAL DRAINAGE: ICD-10-CM

## 2019-05-18 PROCEDURE — 99213 OFFICE O/P EST LOW 20 MIN: CPT | Performed by: FAMILY MEDICINE

## 2019-05-18 RX ORDER — AMOXICILLIN 500 MG/1
500 CAPSULE ORAL 3 TIMES DAILY
Qty: 30 CAPSULE | Refills: 0 | Status: SHIPPED | OUTPATIENT
Start: 2019-05-18 | End: 2020-02-28

## 2019-05-18 RX ORDER — CETIRIZINE HYDROCHLORIDE 10 MG/1
10 TABLET ORAL DAILY
Qty: 30 TABLET | Refills: 3 | Status: SHIPPED | OUTPATIENT
Start: 2019-05-18 | End: 2020-02-28

## 2019-05-18 RX ORDER — FLUTICASONE PROPIONATE 50 MCG
1 SPRAY, SUSPENSION (ML) NASAL 2 TIMES DAILY
Qty: 16 G | Refills: 3 | Status: SHIPPED | OUTPATIENT
Start: 2019-05-18 | End: 2021-03-04

## 2019-05-18 NOTE — TELEPHONE ENCOUNTER
Margot has a chest cold for over one week and lost voice and now is back.  Today sinus on left side of face is draining.  Margot is requesting medication over the phone.  FNA advised to be seen at [facility] and Margot agreed.

## 2019-05-18 NOTE — PROGRESS NOTES
Margot Kendrick is a 53 year old female who comes in today for about 10 days of symptoms    Had chest cold for a week    Cough, lost voice    Last night sinus got real bad    Feels like may be allergies    Sick about every other week since early March    After going to public places         No fever/ chills    No history of environmental allergies    No asthma     Strep in Dec    Just started the flonase    Has not tried any allergy pills    Some nosebleeds off and on recently also    Physical Exam   Constitutional: She is oriented to person, place, and time. She appears well-developed and well-nourished.   HENT:   Head: Normocephalic and atraumatic.   Right Ear: Tympanic membrane, external ear and ear canal normal.   Left Ear: Tympanic membrane, external ear and ear canal normal.   Mouth/Throat: Oropharynx is clear and moist.   Nasal mucosa quite swollen, moderately red bilaterally.  minimally sore over sinuses.  nontender submandib area     Eyes: Conjunctivae are normal.   Neck: Neck supple.   Cardiovascular: Normal rate, regular rhythm and normal heart sounds.   Pulmonary/Chest: Effort normal and breath sounds normal. No respiratory distress. She exhibits no tenderness.   Abdominal: Soft. There is no tenderness.   No back or costovertebral angle tenderness     Neurological: She is alert and oriented to person, place, and time.       ASSESSMENT / PLAN:  (J01.90) Acute sinusitis, recurrence not specified, unspecified location  (primary encounter diagnosis)  Comment: prescription to hold for now.  This is likely allergic and may be viral also.  However, just have printed prescription on hand in case symptoms worsen/ don't resolve. This could save her a visit in near future.   Plan: amoxicillin (AMOXIL) 500 MG capsule             (R09.82) Post-nasal drainage  Comment: advised using the flonase daily for at least next 1-2 weeks, could use longer if desired.   Plan: fluticasone (FLONASE) 50  MCG/ACT nasal spray             (Z91.09) Environmental allergies  Comment:  Take daily antihistamine  Plan: cetirizine (ZYRTEC) 10 MG tablet             (R04.0) Epistaxis  Comment: use over the counter nasal saline as needed   Plan: follow up prn     Follow up as needed based on symptoms    Be seen promptly if symptoms acutely worsen     I reviewed the patient's medications, allergies, medical history, family history, and social history.    Jose Rodriguez MD

## 2019-05-18 NOTE — PATIENT INSTRUCTIONS
Take the flonase for at least 1-2 weeks    Also take the cetirizine daily    Hold prescription for antibiotics/ fill if symptoms worsen/ don't resolve    Stay well hydrated    Nasal saline spray    Follow up as needed based on symptoms

## 2019-06-07 ENCOUNTER — MYC MEDICAL ADVICE (OUTPATIENT)
Dept: FAMILY MEDICINE | Facility: CLINIC | Age: 54
End: 2019-06-07

## 2019-06-07 DIAGNOSIS — M25.531 RIGHT WRIST PAIN: Primary | ICD-10-CM

## 2019-08-20 ENCOUNTER — TRANSFERRED RECORDS (OUTPATIENT)
Dept: HEALTH INFORMATION MANAGEMENT | Facility: CLINIC | Age: 54
End: 2019-08-20

## 2019-09-09 ENCOUNTER — MYC REFILL (OUTPATIENT)
Dept: FAMILY MEDICINE | Facility: CLINIC | Age: 54
End: 2019-09-09

## 2019-09-09 DIAGNOSIS — F51.01 PRIMARY INSOMNIA: ICD-10-CM

## 2019-09-09 RX ORDER — TEMAZEPAM 15 MG/1
15 CAPSULE ORAL
Qty: 30 CAPSULE | Refills: 5 | Status: SHIPPED | OUTPATIENT
Start: 2019-09-09 | End: 2020-03-16

## 2019-09-09 NOTE — TELEPHONE ENCOUNTER
RX [place] ([place]) reviewed:  reviewed- no concerns    [place] profile:  [email or web address]

## 2019-09-17 ENCOUNTER — TRANSFERRED RECORDS (OUTPATIENT)
Dept: HEALTH INFORMATION MANAGEMENT | Facility: CLINIC | Age: 54
End: 2019-09-17

## 2019-09-28 ENCOUNTER — HEALTH MAINTENANCE LETTER (OUTPATIENT)
Age: 54
End: 2019-09-28

## 2019-10-04 ENCOUNTER — TRANSFERRED RECORDS (OUTPATIENT)
Dept: HEALTH INFORMATION MANAGEMENT | Facility: CLINIC | Age: 54
End: 2019-10-04

## 2019-10-22 ENCOUNTER — TELEPHONE (OUTPATIENT)
Dept: FAMILY MEDICINE | Facility: CLINIC | Age: 54
End: 2019-10-22

## 2019-10-22 NOTE — TELEPHONE ENCOUNTER
Panel Management Review      Patient has the following on her problem list: None      Composite cancer screening  Chart review shows that this patient is due/due soon for the following Mammogram  Summary:    Patient is due/failing the following:   MAMMOGRAM    Action needed:   Mammogram due    Type of outreach:    Phone, spoke to patient.  and gave her the phone number to schedule a mammogram    Questions for provider review:    None                                                                                                                                    Felipa Montemayor CMA

## 2019-12-06 ENCOUNTER — TRANSFERRED RECORDS (OUTPATIENT)
Dept: HEALTH INFORMATION MANAGEMENT | Facility: CLINIC | Age: 54
End: 2019-12-06

## 2020-02-28 ENCOUNTER — OFFICE VISIT (OUTPATIENT)
Dept: FAMILY MEDICINE | Facility: CLINIC | Age: 55
End: 2020-02-28
Payer: COMMERCIAL

## 2020-02-28 VITALS
TEMPERATURE: 99.4 F | SYSTOLIC BLOOD PRESSURE: 128 MMHG | RESPIRATION RATE: 15 BRPM | DIASTOLIC BLOOD PRESSURE: 84 MMHG | HEART RATE: 94 BPM | OXYGEN SATURATION: 99 % | WEIGHT: 167.8 LBS | HEIGHT: 65 IN | BODY MASS INDEX: 27.96 KG/M2

## 2020-02-28 DIAGNOSIS — G44.219 EPISODIC TENSION-TYPE HEADACHE, NOT INTRACTABLE: ICD-10-CM

## 2020-02-28 DIAGNOSIS — F41.9 ANXIETY: ICD-10-CM

## 2020-02-28 DIAGNOSIS — M79.7 FIBROMYALGIA: ICD-10-CM

## 2020-02-28 DIAGNOSIS — G43.119 INTRACTABLE MIGRAINE WITH AURA WITHOUT STATUS MIGRAINOSUS: ICD-10-CM

## 2020-02-28 LAB
ALBUMIN SERPL-MCNC: 3.8 G/DL (ref 3.4–5)
ALP SERPL-CCNC: 44 U/L (ref 40–150)
ALT SERPL W P-5'-P-CCNC: 41 U/L (ref 0–50)
ANION GAP SERPL CALCULATED.3IONS-SCNC: 5 MMOL/L (ref 3–14)
AST SERPL W P-5'-P-CCNC: 23 U/L (ref 0–45)
BILIRUB SERPL-MCNC: 0.4 MG/DL (ref 0.2–1.3)
BUN SERPL-MCNC: 11 MG/DL (ref 7–30)
CALCIUM SERPL-MCNC: 9.2 MG/DL (ref 8.5–10.1)
CHLORIDE SERPL-SCNC: 106 MMOL/L (ref 94–109)
CO2 SERPL-SCNC: 27 MMOL/L (ref 20–32)
CREAT SERPL-MCNC: 0.73 MG/DL (ref 0.52–1.04)
CRP SERPL-MCNC: <2.9 MG/L (ref 0–8)
ERYTHROCYTE [SEDIMENTATION RATE] IN BLOOD BY WESTERGREN METHOD: 6 MM/H (ref 0–30)
FSH SERPL-ACNC: 14.4 IU/L
GFR SERPL CREATININE-BSD FRML MDRD: >90 ML/MIN/{1.73_M2}
GLUCOSE SERPL-MCNC: 106 MG/DL (ref 70–99)
POTASSIUM SERPL-SCNC: 4.2 MMOL/L (ref 3.4–5.3)
PROT SERPL-MCNC: 7.5 G/DL (ref 6.8–8.8)
SODIUM SERPL-SCNC: 138 MMOL/L (ref 133–144)
TSH SERPL DL<=0.005 MIU/L-ACNC: 1.61 MU/L (ref 0.4–4)

## 2020-02-28 PROCEDURE — 86140 C-REACTIVE PROTEIN: CPT | Performed by: FAMILY MEDICINE

## 2020-02-28 PROCEDURE — 36415 COLL VENOUS BLD VENIPUNCTURE: CPT | Performed by: FAMILY MEDICINE

## 2020-02-28 PROCEDURE — 84443 ASSAY THYROID STIM HORMONE: CPT | Performed by: FAMILY MEDICINE

## 2020-02-28 PROCEDURE — 85652 RBC SED RATE AUTOMATED: CPT | Performed by: FAMILY MEDICINE

## 2020-02-28 PROCEDURE — 83001 ASSAY OF GONADOTROPIN (FSH): CPT | Performed by: FAMILY MEDICINE

## 2020-02-28 PROCEDURE — 80053 COMPREHEN METABOLIC PANEL: CPT | Performed by: FAMILY MEDICINE

## 2020-02-28 PROCEDURE — 99214 OFFICE O/P EST MOD 30 MIN: CPT | Performed by: FAMILY MEDICINE

## 2020-02-28 RX ORDER — LORAZEPAM 0.5 MG/1
0.5 TABLET ORAL DAILY
Qty: 30 TABLET | Refills: 0 | Status: SHIPPED | OUTPATIENT
Start: 2020-02-28 | End: 2021-05-12

## 2020-02-28 RX ORDER — SUMATRIPTAN 50 MG/1
50 TABLET, FILM COATED ORAL
Qty: 18 TABLET | Refills: 3 | Status: SHIPPED | OUTPATIENT
Start: 2020-02-28 | End: 2021-05-12

## 2020-02-28 RX ORDER — TOPIRAMATE 25 MG/1
TABLET, FILM COATED ORAL
Qty: 208 TABLET | Refills: 0 | Status: SHIPPED | OUTPATIENT
Start: 2020-02-28 | End: 2021-05-12

## 2020-02-28 RX ORDER — CYCLOBENZAPRINE HCL 10 MG
10 TABLET ORAL
Qty: 30 TABLET | Refills: 11 | Status: SHIPPED | OUTPATIENT
Start: 2020-02-28 | End: 2021-05-12

## 2020-02-28 RX ORDER — MELOXICAM 7.5 MG/1
7.5 TABLET ORAL DAILY
Qty: 30 TABLET | Refills: 5 | Status: SHIPPED | OUTPATIENT
Start: 2020-02-28 | End: 2021-05-12

## 2020-02-28 ASSESSMENT — MIFFLIN-ST. JEOR: SCORE: 1372.02

## 2020-02-28 NOTE — PATIENT INSTRUCTIONS
Schedule a mammogram  [facility] Mammo Schedule    [facility] ~ [phone]  One Day weekly- Alternating Days    [place] ~ [phone]  Every other Monday or Wednesday   & one Saturday morning a month    [place] ~ [phone]  Every Other Monday Afternoon    ~ [place] ~   Every other Monday Morning    [place] ~ [phone]  Every Monday morning  Every Tuesday afternoon  Wed, Thurs, Friday morning & afternoon  Updated 06/18/18          Topomax start as directed for pain     Labs today     Try taking the imitrex more aggressively two tabs at onset of migraine or repeat dose  After 2 hours if headache not gone

## 2020-02-28 NOTE — NURSING NOTE
"Chief Complaint   Patient presents with     Headache     Pain       Initial /84 (BP Location: Right arm, Patient Position: Chair, Cuff Size: Adult Large)   Pulse 94   Temp 99.4  F (37.4  C) (Tympanic)   Resp 15   Ht 1.651 m (5' 5\")   Wt 76.1 kg (167 lb 12.8 oz)   SpO2 99%   BMI 27.92 kg/m   Estimated body mass index is 27.92 kg/m  as calculated from the following:    Height as of this encounter: 1.651 m (5' 5\").    Weight as of this encounter: 76.1 kg (167 lb 12.8 oz).    Patient presents to the clinic using No DME    Health Maintenance that is potentially due pending provider review:  Mammogram    Gave pt phone number/pended order to schedule mammo and/or colonoscopy(or FIT)    Is there anyone who you would like to be able to receive your results? No  If yes have patient fill out ALLEGRA    "

## 2020-02-28 NOTE — PROGRESS NOTES
Subjective     Margot Kendrick is a 54 year old female who presents to clinic today for the following health issues:    HPI   Migraine     Since your last clinic visit, how have your headaches changed?  Worsened    How often are you getting headaches or migraines? Daily for a week     Are you able to do normal daily activities when you have a migraine? No    Are you taking rescue/relief medications? (Select all that apply) ibuprofen (Advil, Motrin), naproxyn (Aleve) and sumatriptan (Imitrex)    How helpful is your rescue/relief medication?  I get some relief    Are you taking any medications to prevent migraines? (Select all that apply)  No    In the past 4 weeks, how often have you gone to urgent care or the emergency room because of your headaches?  0    Chronic Pain Follow-Up    Where in your body do you have pain? fibromyalgia  How has your pain affected your ability to work? Can work part time with limitations  Which of these pain treatments have you tried since your last clinic visit?None  How well are you sleeping? Good  How has your mood been since your last visit? Much worse  Have you had a significant life event? No  Other aggravating factors: none  Taking medication as directed? Yes    No flowsheet data found.  No flowsheet data found.  No flowsheet data found.  Encounter-Level CSA:    There are no encounter-level csa.     Patient-Level CSA:    There are no patient-level csa.         Anxiety Follow-Up    How are you doing with your anxiety since your last visit? No change    Are you having other symptoms that might be associated with anxiety? No    Have you had a significant life event? No     Are you feeling depressed? No    Do you have any concerns with your use of alcohol or other drugs? No    Social History     Tobacco Use     Smoking status: Former Smoker     Packs/day: 0.50     Types: Cigarettes     Smokeless tobacco: Never Used     Tobacco comment: quit plan offered   Substance Use  "Topics     Alcohol use: No     Alcohol/week: 0.0 standard drinks     Drug use: No     No flowsheet data found.  No flowsheet data found.              Reviewed and updated as needed this visit by Provider  Tobacco  Allergies  Meds  Problems  Med Hx  Surg Hx  Fam Hx         Review of Systems   ROS COMP: Constitutional, HEENT, cardiovascular, pulmonary, gi and gu systems are negative, except as otherwise noted.      Objective    /84 (BP Location: Right arm, Patient Position: Chair, Cuff Size: Adult Large)   Pulse 94   Temp 99.4  F (37.4  C) (Tympanic)   Resp 15   Ht 1.651 m (5' 5\")   Wt 76.1 kg (167 lb 12.8 oz)   SpO2 99%   BMI 27.92 kg/m    Body mass index is 27.92 kg/m .  Physical Exam   GENERAL APPEARANCE: healthy, alert and no distress  NECK: no adenopathy, no asymmetry, masses, or scars and thyroid normal to palpation  RESP: lungs clear to auscultation - no rales, rhonchi or wheezes  CV: regular rates and rhythm, normal S1 S2, no S3 or S4 and no murmur, click or rub  MS: extremities normal- no gross deformities noted  PSYCH: mentation appears normal and affect normal/bright    Diagnostic Test Results:  Labs reviewed in Epic        Assessment & Plan     1. Intractable migraine with aura without status migrainosus  Stable no change in treatment plan.   - SUMAtriptan (IMITREX) 50 MG tablet; Take 1 tablet (50 mg) by mouth at onset of headache for migraine May repeat dose in 2 hours.  Do not exceed 200 mg in 24 hours  Dispense: 18 tablet; Refill: 3    2. Fibromyalgia  Fair control   - meloxicam (MOBIC) 7.5 MG tablet; Take 1 tablet (7.5 mg) by mouth daily  Dispense: 30 tablet; Refill: 5  - cyclobenzaprine (FLEXERIL) 10 MG tablet; Take 1 tablet (10 mg) by mouth nightly as needed for muscle spasms  Dispense: 30 tablet; Refill: 11  - Comprehensive metabolic panel  - topiramate (TOPAMAX) 25 MG tablet; Take 1 tablet (25 mg) by mouth 2 times daily for 14 days, THEN 2 tablets (50 mg) 2 times daily.  " "Dispense: 208 tablet; Refill: 0  - CRP inflammation  - Erythrocyte sedimentation rate auto    3. Anxiety  Having some break through thought perimenopause related   - LORazepam (ATIVAN) 0.5 MG tablet; Take 1 tablet (0.5 mg) by mouth daily For panic or for flying  Dispense: 30 tablet; Refill: 0  - TSH with free T4 reflex  - Follicle stimulating hormone    4. Episodic tension-type headache, not intractable  Stable no change in treatment plan.   - cyclobenzaprine (FLEXERIL) 10 MG tablet; Take 1 tablet (10 mg) by mouth nightly as needed for muscle spasms  Dispense: 30 tablet; Refill: 11     BMI:   Estimated body mass index is 27.92 kg/m  as calculated from the following:    Height as of this encounter: 1.651 m (5' 5\").    Weight as of this encounter: 76.1 kg (167 lb 12.8 oz).           Patient Instructions   Schedule a mammogram  [facility] Mammo Schedule    [facility] ~ [phone]  One Day weekly- Alternating Days    [place] ~ [phone]  Every other Monday or Wednesday   & one Saturday morning a month    [place] ~ [phone]  Every Other Monday Afternoon    ~ [place] ~   Every other Monday Morning    [place] ~ [phone]  Every Monday morning  Every Tuesday afternoon  Wed, Thurs, Friday morning & afternoon  Updated 06/18/18          Topomax start as directed for pain     Labs today     Try taking the imitrex more aggressively two tabs at onset of migraine or repeat dose  After 2 hours if headache not gone      Return in about 3 months (around 5/28/2020).    Lakeisha Haley MD  [facility]      "

## 2020-03-02 ENCOUNTER — MYC MEDICAL ADVICE (OUTPATIENT)
Dept: FAMILY MEDICINE | Facility: CLINIC | Age: 55
End: 2020-03-02

## 2020-03-09 ENCOUNTER — MYC MEDICAL ADVICE (OUTPATIENT)
Dept: FAMILY MEDICINE | Facility: CLINIC | Age: 55
End: 2020-03-09

## 2020-03-16 ENCOUNTER — MYC REFILL (OUTPATIENT)
Dept: FAMILY MEDICINE | Facility: CLINIC | Age: 55
End: 2020-03-16

## 2020-03-16 DIAGNOSIS — F51.01 PRIMARY INSOMNIA: ICD-10-CM

## 2020-03-17 RX ORDER — TEMAZEPAM 15 MG/1
15 CAPSULE ORAL
Qty: 30 CAPSULE | Refills: 5 | Status: SHIPPED | OUTPATIENT
Start: 2020-03-17 | End: 2020-09-18

## 2020-03-17 NOTE — TELEPHONE ENCOUNTER
Requested Prescriptions   Pending Prescriptions Disp Refills     temazepam (RESTORIL) 15 MG capsule 30 capsule 5     Sig: Take 1 capsule (15 mg) by mouth nightly as needed for sleep       There is no refill protocol information for this order        Last Written Prescription Date:  09/19/19  Last Fill Quantity: 30,  # refills: 5   Last office visit: 2/28/2020 with prescribing provider:  02/28/20   Future Office Visit:

## 2020-03-17 NOTE — TELEPHONE ENCOUNTER
RX [place] ([place]) reviewed:  reviewed- no concerns    [place]:  [email or web address]      02/28/2020  1   02/28/2020  Lorazepam 0.5 Mg Tablet  30.00 30 Pa Phil  [id number]  Toi (4362)  0[place] 0.[place]   02/06/2020  1   09/09/2019  Temazepam 15 Mg Capsule  30.00 30 Pa Phil  [id number]  Toi (4362)  5/[place]   01/07/2020  1   09/09/2019  Temazepam 15 Mg Capsule  30.00 30 Pa Phil  [id number]  Toi (4362)  4[place]   12/10/2019  1   09/09/2019  Temazepam 15 Mg Capsule  30.00 30 Pa Phil  [id number]  Toi (4362)  3/[place]   11/08/2019  1   09/09/2019  Temazepam 15 Mg Capsule  30.00 30 Pa Phil  [id number]  Toi (4362)  2/5 [place]   10/09/2019  1   09/09/2019  Temazepam 15 Mg Capsule  30.00 30 Pa Phil  [id number]  Toi (4362)  1/5 [place]   09/09/2019  1   09/09/2019  Temazepam 15 Mg Capsule  30.00 30 Pa Phil  [id number]  Toi (4362)  0/[place]   07/29/2019  1   03/01/2019  Temazepam 15 Mg Capsule  30.00 30 Pa Phil  [id number]  Toi (4362)  5/5 0.[place]

## 2020-06-30 ENCOUNTER — MYC MEDICAL ADVICE (OUTPATIENT)
Dept: FAMILY MEDICINE | Facility: CLINIC | Age: 55
End: 2020-06-30

## 2020-09-18 ENCOUNTER — MYC REFILL (OUTPATIENT)
Dept: FAMILY MEDICINE | Facility: CLINIC | Age: 55
End: 2020-09-18

## 2020-09-18 DIAGNOSIS — F51.01 PRIMARY INSOMNIA: ICD-10-CM

## 2020-09-21 RX ORDER — TEMAZEPAM 15 MG/1
30 CAPSULE ORAL
Qty: 60 CAPSULE | Refills: 5 | Status: SHIPPED | OUTPATIENT
Start: 2020-09-21 | End: 2021-05-12

## 2020-09-21 NOTE — TELEPHONE ENCOUNTER
Requested Prescriptions   Pending Prescriptions Disp Refills     temazepam (RESTORIL) 15 MG capsule 60 capsule 5     Sig: Take 1-2 capsules (15-30 mg) by mouth nightly as needed for sleep       There is no refill protocol information for this order        Last Written Prescription Date:  3/17/20  Last Fill Quantity: 30,  # refills: 5   Last office visit: 2/28/2020 with prescribing provider:     Future Office Visit:

## 2020-10-26 ENCOUNTER — HOSPITAL ENCOUNTER (EMERGENCY)
Facility: CLINIC | Age: 55
Discharge: HOME | End: 2020-10-27
Attending: FAMILY MEDICINE | Admitting: FAMILY MEDICINE
Payer: COMMERCIAL

## 2020-10-26 VITALS
HEART RATE: 101 BPM | BODY MASS INDEX: 27.32 KG/M2 | HEIGHT: 66 IN | WEIGHT: 170 LBS | DIASTOLIC BLOOD PRESSURE: 71 MMHG | OXYGEN SATURATION: 99 % | SYSTOLIC BLOOD PRESSURE: 110 MMHG | TEMPERATURE: 98.2 F | RESPIRATION RATE: 16 BRPM

## 2020-10-26 DIAGNOSIS — R07.81 PLEURITIC CHEST PAIN: ICD-10-CM

## 2020-10-26 PROCEDURE — 93005 ELECTROCARDIOGRAM TRACING: CPT | Performed by: FAMILY MEDICINE

## 2020-10-26 PROCEDURE — 99285 EMERGENCY DEPT VISIT HI MDM: CPT | Mod: 25 | Performed by: FAMILY MEDICINE

## 2020-10-26 PROCEDURE — 93010 ELECTROCARDIOGRAM REPORT: CPT | Performed by: FAMILY MEDICINE

## 2020-10-26 ASSESSMENT — MIFFLIN-ST. JEOR: SCORE: 1384.92

## 2020-10-26 NOTE — ED AVS SNAPSHOT
[facility] Emergency Dept  [address] [address]  Phone: [phone]  Fax: [phone]                                    Margot Kendrick   MRN: [id number]    Department: [facility] Emergency Dept   Date of Visit: 10/26/2020           After Visit Summary Signature Page    I have received my discharge instructions, and my questions have been answered. I have discussed any challenges I see with this plan with the nurse or doctor.    ..........................................................................................................................................  Patient/Patient Representative Signature      ..........................................................................................................................................  Patient Representative Print Name and Relationship to Patient    ..................................................               ................................................  Date                                   Time    ..........................................................................................................................................  Reviewed by Signature/Title    ...................................................              ..............................................  Date                                               Time          22EPIC Rev 08/18

## 2020-10-27 ENCOUNTER — APPOINTMENT (OUTPATIENT)
Dept: GENERAL RADIOLOGY | Facility: CLINIC | Age: 55
End: 2020-10-27
Attending: FAMILY MEDICINE
Payer: COMMERCIAL

## 2020-10-27 LAB
ALBUMIN SERPL-MCNC: 3.9 G/DL (ref 3.4–5)
ALP SERPL-CCNC: 46 U/L (ref 40–150)
ALT SERPL W P-5'-P-CCNC: 48 U/L (ref 0–50)
ANION GAP SERPL CALCULATED.3IONS-SCNC: 5 MMOL/L (ref 3–14)
AST SERPL W P-5'-P-CCNC: 25 U/L (ref 0–45)
BASOPHILS # BLD AUTO: 0.1 10E9/L (ref 0–0.2)
BASOPHILS NFR BLD AUTO: 0.8 %
BILIRUB SERPL-MCNC: 0.6 MG/DL (ref 0.2–1.3)
BUN SERPL-MCNC: 12 MG/DL (ref 7–30)
CALCIUM SERPL-MCNC: 8.8 MG/DL (ref 8.5–10.1)
CHLORIDE SERPL-SCNC: 109 MMOL/L (ref 94–109)
CO2 SERPL-SCNC: 26 MMOL/L (ref 20–32)
CREAT SERPL-MCNC: 0.74 MG/DL (ref 0.52–1.04)
D DIMER PPP FEU-MCNC: 0.5 UG/ML FEU (ref 0–0.5)
DIFFERENTIAL METHOD BLD: NORMAL
EOSINOPHIL # BLD AUTO: 0.2 10E9/L (ref 0–0.7)
EOSINOPHIL NFR BLD AUTO: 2 %
ERYTHROCYTE [DISTWIDTH] IN BLOOD BY AUTOMATED COUNT: 12.5 % (ref 10–15)
GFR SERPL CREATININE-BSD FRML MDRD: >90 ML/MIN/{1.73_M2}
GLUCOSE SERPL-MCNC: 134 MG/DL (ref 70–99)
HCT VFR BLD AUTO: 43.1 % (ref 35–47)
HGB BLD-MCNC: 14.4 G/DL (ref 11.7–15.7)
IMM GRANULOCYTES # BLD: 0 10E9/L (ref 0–0.4)
IMM GRANULOCYTES NFR BLD: 0.2 %
LIPASE SERPL-CCNC: 160 U/L (ref 73–393)
LYMPHOCYTES # BLD AUTO: 2.7 10E9/L (ref 0.8–5.3)
LYMPHOCYTES NFR BLD AUTO: 29.7 %
MCH RBC QN AUTO: 31 PG (ref 26.5–33)
MCHC RBC AUTO-ENTMCNC: 33.4 G/DL (ref 31.5–36.5)
MCV RBC AUTO: 93 FL (ref 78–100)
MONOCYTES # BLD AUTO: 0.8 10E9/L (ref 0–1.3)
MONOCYTES NFR BLD AUTO: 8.8 %
NEUTROPHILS # BLD AUTO: 5.3 10E9/L (ref 1.6–8.3)
NEUTROPHILS NFR BLD AUTO: 58.5 %
NRBC # BLD AUTO: 0 10*3/UL
NRBC BLD AUTO-RTO: 0 /100
PLATELET # BLD AUTO: 269 10E9/L (ref 150–450)
POTASSIUM SERPL-SCNC: 3.9 MMOL/L (ref 3.4–5.3)
PROT SERPL-MCNC: 7.3 G/DL (ref 6.8–8.8)
RBC # BLD AUTO: 4.65 10E12/L (ref 3.8–5.2)
SODIUM SERPL-SCNC: 140 MMOL/L (ref 133–144)
TROPONIN I SERPL-MCNC: <0.015 UG/L (ref 0–0.04)
WBC # BLD AUTO: 9.1 10E9/L (ref 4–11)

## 2020-10-27 PROCEDURE — 84484 ASSAY OF TROPONIN QUANT: CPT | Performed by: FAMILY MEDICINE

## 2020-10-27 PROCEDURE — 85379 FIBRIN DEGRADATION QUANT: CPT | Performed by: FAMILY MEDICINE

## 2020-10-27 PROCEDURE — 85025 COMPLETE CBC W/AUTO DIFF WBC: CPT | Performed by: FAMILY MEDICINE

## 2020-10-27 PROCEDURE — 80053 COMPREHEN METABOLIC PANEL: CPT | Performed by: FAMILY MEDICINE

## 2020-10-27 PROCEDURE — 83690 ASSAY OF LIPASE: CPT | Performed by: FAMILY MEDICINE

## 2020-10-27 PROCEDURE — 71046 X-RAY EXAM CHEST 2 VIEWS: CPT

## 2020-10-27 NOTE — DISCHARGE INSTRUCTIONS
Return to the Emergency Room if the following occurs:     Severely worsened pain or breathing, fever >101, or for any concern at anytime.    Or, follow-up with the following provider as we discussed:     Return to your primary doctor as needed, or if not improved over the next 7 days.    Medications discussed:    Ibuprofen / tylenol alternating every three hours for comfort, as needed.    If you received pain-relieving or sedating medication during your time in the ER, avoid alcohol, driving automobiles, or working with machinery.  Also, a responsible adult must stay with you.        Call the Nurse Advice Line at [phone] or [phone] for any concern at anytime.

## 2020-10-27 NOTE — ED PROVIDER NOTES
HPI   The patient is a 55-year-old female presenting with left-sided chest pain.  Symptoms began 4 days ago.  She describes constant pain that is always there when she takes a deep breath.  She tells me the pain is worsened with time.  She had great difficulty falling asleep tonight because of the discomfort.  The pain is felt below her left breast.  When she takes a very deep breath she feels it in her mid back, also on the left side and straight through from just below the breast.  She denies having a cough, fever, or feeling sick.  She denies feeling short of breath.  The pain is not mechanical in that she denies tenderness or worsened discomfort with movement.  However, taking a breath does worsen the pain and this is the only mechanical movement that worsens her pain consistently.  No new leg pain or swelling.  No trauma, injury, overuse, or new activities.  No skin rash.  No midline back pain.  No recent weight loss.        Allergies:  Allergies   Allergen Reactions     Asa [Aspirin] Nausea     TINGLING  FINGERS, HEAD,      Problem List:    Patient Active Problem List    Diagnosis Date Noted     Fibromyalgia 08/02/2013     Priority: High     Insomnia 04/25/2011     Priority: Medium     CARDIOVASCULAR SCREENING; LDL GOAL LESS THAN 160 10/31/2010     Priority: Medium     Vitamin D deficiency 03/25/2010     Priority: Medium     Migraine headache 12/16/2009     Priority: Medium     (Problem list name updated by automated process. Provider to review and confirm.)       FAMILY HISTORY OF BLOOD DISORDER-NONSPEC 02/11/2009     Priority: Medium     pernicious anemia       Chiari malformation type I (H) 12/08/2008     Priority: Medium     Evalutated by neurology and neurosurgery, and patient has declined surgery.       Tobacco abuse 08/12/2008     Priority: Medium     Esophageal reflux 11/30/2005     Priority: Medium     GERD       Sensorineural hearing loss 11/30/2005     Priority: Medium     Problem list name  updated by automated process. Provider to review       Abdominal pain, generalized 02/15/2005     Priority: Medium     Irritable bowel syndrome 02/15/2005     Priority: Medium      Past Medical History:    Past Medical History:   Diagnosis Date     Arthritis      Esophageal reflux      Migraine, unspecified, without mention of intractable migraine without mention of status migrainosus      Mitral valve disorders(424.0)      Myalgia and myositis, unspecified      Past Surgical History:    Past Surgical History:   Procedure Laterality Date     ABDOMEN SURGERY      Right Sanjiv-Collectomy     APPENDECTOMY  2005    With collectomy     COLONOSCOPY       COLONOSCOPY  2012    Procedure: COLONOSCOPY;  Colonoscopy;  Surgeon: Jun Reddy MD;  Location: [facility]     COLONOSCOPY N/A 2/15/2016    Procedure: COLONOSCOPY;  Surgeon: Anson Sethi MD;  Location: [facility]     ENT SURGERY       ENT SURGERY      Sinus     ESOPHAGOSCOPY, GASTROSCOPY, DUODENOSCOPY (EGD), COMBINED N/A 10/8/2015    Procedure: COMBINED ESOPHAGOSCOPY, GASTROSCOPY, DUODENOSCOPY (EGD), BIOPSY SINGLE OR MULTIPLE;  Surgeon: Anson Sethi MD;  Location: [facility]     EYE SURGERY      Recurrent corneal erosion     HYSTERECTOMY, VAGINAL  2002     LAPAROSCOPIC CHOLECYSTECTOMY N/A 10/27/2015    Procedure: LAPAROSCOPIC CHOLECYSTECTOMY;  Surgeon: Anson Sethi MD;  Location: [facility]     ORTHOPEDIC SURGERY       ORTHOPEDIC SURGERY      Left shoulder/Right Shoulder/Right Wrist x 3     SURGICAL HISTORY OF -   ,,         SURGICAL HISTORY OF -   1993    Excision (L) ganglion cyst     SURGICAL HISTORY OF -   1993    Tubal ligation     SURGICAL HISTORY OF -   1998    Septoplasty/sinus surgery     SURGICAL HISTORY OF -       TVH     SURGICAL HISTORY OF -   2004/ /15/2005    Colonoscopy     SURGICAL HISTORY OF -   2001    Gastroscopy     SURGICAL HISTORY OF -       Septoplasty     SURGICAL  HISTORY OF -   2005/03    rt craig colectomy     SURGICAL HISTORY OF -   12/05    Open distal clavicle resection with subacromial decompression     SURGICAL HISTORY OF -   04/11/2008    rt wrist TFCC     SURGICAL HISTORY OF -   05/09/2008    rt wrist removal of scar tissue     SURGICAL HISTORY OF -   12/2009    Arthroscopic subacromial decompression with introduction of On-Q pain pump.      Family History:    Family History   Problem Relation Age of Onset     C.A.D. Maternal Grandmother      Arthritis Maternal Grandmother         rheumatoid arthritis     Diabetes Maternal Grandmother      Cardiovascular Maternal Grandfather      Coronary Artery Disease Maternal Grandfather      Cardiovascular Paternal Grandmother      Cardiovascular Paternal Grandfather      Respiratory Daughter         ASTHMA     Neurologic Disorder Father         FIBROMYALGIA     Heart Disease Father 68     C.A.D. Father 68        MI     Osteoporosis Father      Neurologic Disorder Brother         FIBROMYALGIA     Neurologic Disorder Sister         FIBROMYALGIA     Anxiety Disorder Sister      Thyroid Disease Mother      Hypertension Mother      Hyperlipidemia Mother      Arthritis Maternal Aunt         psoriatic     Diabetes Other         Maternal Aunt     Coronary Artery Disease Other         Maternal Aunt     Hypertension Other         Maternal Aunt     Hyperlipidemia Other         Maternal Aunt     Melanoma No family hx of      Social History:  Marital Status:   [2]  Social History     Tobacco Use     Smoking status: Former Smoker     Packs/day: 0.50     Types: Cigarettes     Smokeless tobacco: Never Used     Tobacco comment: quit plan offered   Substance Use Topics     Alcohol use: No     Alcohol/week: 0.0 standard drinks     Drug use: No      Medications:         ADVIL 200 MG OR TABS       cyclobenzaprine (FLEXERIL) 10 MG tablet       fluticasone (FLONASE) 50 MCG/ACT nasal spray       LORazepam (ATIVAN) 0.5 MG tablet       meloxicam  "(MOBIC) 7.5 MG tablet       naproxen sodium (ALEVE) 220 MG capsule       SUMAtriptan (IMITREX) 50 MG tablet       temazepam (RESTORIL) 15 MG capsule       topiramate (TOPAMAX) 25 MG tablet       traMADol (ULTRAM) 50 MG tablet      Review of Systems   All other systems reviewed and are negative.      PE   BP: 110/71  Pulse: 101  Temp: 98.2  F (36.8  C)  Resp: 16  Height: 166.4 cm (5' 5.5\")  Weight: 77.1 kg (170 lb)  SpO2: 99 %  Physical Exam  Vitals signs reviewed.   Constitutional:       General: She is not in acute distress.     Appearance: She is well-developed.   HENT:      Head: Normocephalic and atraumatic.      Right Ear: External ear normal.      Left Ear: External ear normal.      Nose: Nose normal.      Mouth/Throat:      Mouth: Mucous membranes are moist.      Pharynx: Oropharynx is clear.   Eyes:      Extraocular Movements: Extraocular movements intact.      Conjunctiva/sclera: Conjunctivae normal.      Pupils: Pupils are equal, round, and reactive to light.   Neck:      Musculoskeletal: Normal range of motion.   Cardiovascular:      Rate and Rhythm: Normal rate and regular rhythm.   Pulmonary:      Effort: Pulmonary effort is normal.   Abdominal:      General: Abdomen is flat.      Tenderness: There is no abdominal tenderness.   Musculoskeletal: Normal range of motion.      Comments: She has no tenderness.  I cannot reproduce her pain with movement.  Position does not worsen her pain.  Taking a deep breath does worsen her pain.   Skin:     General: Skin is warm and dry.   Neurological:      Mental Status: She is alert and oriented to person, place, and time.   Psychiatric:         Behavior: Behavior normal.         ED COURSE and MDM   0022.  The patient has left-sided chest pain.  It is pleuritic.  She denies associated symptoms.  EKG and blood work pending.  Imaging will follow lab results.    0140.  Low concern for acute coronary obstruction or myocarditis/pericarditis.  Low concern for pulmonary " source of pain.  Chest x-ray is unremarkable.  Low concern for aortic pathology.  Low concern for pulmonary embolism.  Chest wall related?  Follow-up discussed.  Return for worsening.    EKG  (0038)   Interpretation performed by me.  Rate: 84     Rhythm: sinus     Axis: nl  Intervals: UT (12-2) 130, QRS (<12) 92, QTc (>5) 382  P wave: nl     QRS complex: nl  ST segment / T-wave: nl  Conclusion: nl    LABS  Labs Ordered and Resulted from Time of ED Arrival Up to the Time of Departure from the ED   COMPREHENSIVE METABOLIC PANEL - Abnormal; Notable for the following components:       Result Value    Glucose 134 (*)     All other components within normal limits   LIPASE   CBC WITH PLATELETS DIFFERENTIAL   TROPONIN I   D DIMER QUANTITATIVE   PERIPHERAL IV CATHETER       IMAGING  Images reviewed by me.  Radiology report also reviewed.  XR Chest 2 Views   Final Result   IMPRESSION: Negative chest.          Procedures    Medications - No data to display      IMPRESSION       ICD-10-CM    1. Pleuritic chest pain  R07.81             Medication List      There are no discharge medications for this visit.                       Amador Carter MD  10/27/20 4641

## 2020-10-27 NOTE — ED NOTES
Pt presents to ED with complaints of Upper right quad abd pain x5 days.  NO pain with palpation.  Denies n/v/d.  Pain worse tonight, unable to get comfortable while sleeping.  Hx of multiple abd surgeries.  A&Ox4.  VSS.

## 2021-01-10 ENCOUNTER — HEALTH MAINTENANCE LETTER (OUTPATIENT)
Age: 56
End: 2021-01-10

## 2021-01-13 ENCOUNTER — MYC MEDICAL ADVICE (OUTPATIENT)
Dept: FAMILY MEDICINE | Facility: CLINIC | Age: 56
End: 2021-01-13

## 2021-03-03 ENCOUNTER — MYC REFILL (OUTPATIENT)
Dept: FAMILY MEDICINE | Facility: CLINIC | Age: 56
End: 2021-03-03

## 2021-03-03 DIAGNOSIS — F41.9 ANXIETY: ICD-10-CM

## 2021-03-03 NOTE — TELEPHONE ENCOUNTER
Requested Prescriptions   Pending Prescriptions Disp Refills     LORazepam (ATIVAN) 0.5 MG tablet 30 tablet 0     Sig: Take 1 tablet (0.5 mg) by mouth daily For panic or for flying       There is no refill protocol information for this order        Last Written Prescription Date:  2/28/20  Last Fill Quantity: 30,  # refills: 0   Last office visit: 2/28/2020 with prescribing provider:     Future Office Visit:

## 2021-03-04 ENCOUNTER — MYC MEDICAL ADVICE (OUTPATIENT)
Dept: FAMILY MEDICINE | Facility: CLINIC | Age: 56
End: 2021-03-04

## 2021-03-04 DIAGNOSIS — R09.82 POST-NASAL DRAINAGE: ICD-10-CM

## 2021-03-04 RX ORDER — LORAZEPAM 0.5 MG/1
0.5 TABLET ORAL DAILY
Qty: 30 TABLET | Refills: 0 | OUTPATIENT
Start: 2021-03-04

## 2021-03-04 RX ORDER — FLUTICASONE PROPIONATE 50 MCG
1 SPRAY, SUSPENSION (ML) NASAL 2 TIMES DAILY
Qty: 16 G | Refills: 3 | Status: SHIPPED | OUTPATIENT
Start: 2021-03-04 | End: 2022-06-10

## 2021-03-13 ENCOUNTER — HEALTH MAINTENANCE LETTER (OUTPATIENT)
Age: 56
End: 2021-03-13

## 2021-05-12 ENCOUNTER — OFFICE VISIT (OUTPATIENT)
Dept: FAMILY MEDICINE | Facility: CLINIC | Age: 56
End: 2021-05-12
Payer: COMMERCIAL

## 2021-05-12 VITALS
DIASTOLIC BLOOD PRESSURE: 76 MMHG | TEMPERATURE: 98.6 F | HEIGHT: 66 IN | WEIGHT: 169 LBS | BODY MASS INDEX: 27.16 KG/M2 | SYSTOLIC BLOOD PRESSURE: 120 MMHG

## 2021-05-12 DIAGNOSIS — G43.119 INTRACTABLE MIGRAINE WITH AURA WITHOUT STATUS MIGRAINOSUS: ICD-10-CM

## 2021-05-12 DIAGNOSIS — M79.7 FIBROMYALGIA: Primary | ICD-10-CM

## 2021-05-12 DIAGNOSIS — F41.9 ANXIETY: ICD-10-CM

## 2021-05-12 DIAGNOSIS — Z78.0 MENOPAUSE: ICD-10-CM

## 2021-05-12 DIAGNOSIS — F51.01 PRIMARY INSOMNIA: ICD-10-CM

## 2021-05-12 DIAGNOSIS — Z12.31 ENCOUNTER FOR SCREENING MAMMOGRAM FOR BREAST CANCER: ICD-10-CM

## 2021-05-12 LAB
ALBUMIN SERPL-MCNC: 3.9 G/DL (ref 3.4–5)
ALP SERPL-CCNC: 42 U/L (ref 40–150)
ALT SERPL W P-5'-P-CCNC: 44 U/L (ref 0–50)
ANION GAP SERPL CALCULATED.3IONS-SCNC: 5 MMOL/L (ref 3–14)
AST SERPL W P-5'-P-CCNC: 26 U/L (ref 0–45)
BILIRUB SERPL-MCNC: 1 MG/DL (ref 0.2–1.3)
BUN SERPL-MCNC: 13 MG/DL (ref 7–30)
CALCIUM SERPL-MCNC: 8.7 MG/DL (ref 8.5–10.1)
CHLORIDE SERPL-SCNC: 106 MMOL/L (ref 94–109)
CHOLEST SERPL-MCNC: 212 MG/DL
CO2 SERPL-SCNC: 26 MMOL/L (ref 20–32)
CREAT SERPL-MCNC: 0.76 MG/DL (ref 0.52–1.04)
FSH SERPL-ACNC: 3.3 IU/L
GFR SERPL CREATININE-BSD FRML MDRD: 90 ML/MIN/{1.73_M2}
GLUCOSE SERPL-MCNC: 99 MG/DL (ref 70–99)
HDLC SERPL-MCNC: 40 MG/DL
LDLC SERPL CALC-MCNC: 132 MG/DL
NONHDLC SERPL-MCNC: 172 MG/DL
POTASSIUM SERPL-SCNC: 4.2 MMOL/L (ref 3.4–5.3)
PROT SERPL-MCNC: 7.4 G/DL (ref 6.8–8.8)
SODIUM SERPL-SCNC: 137 MMOL/L (ref 133–144)
TRIGL SERPL-MCNC: 201 MG/DL
TSH SERPL DL<=0.005 MIU/L-ACNC: 1.5 MU/L (ref 0.4–4)

## 2021-05-12 PROCEDURE — 99214 OFFICE O/P EST MOD 30 MIN: CPT | Performed by: FAMILY MEDICINE

## 2021-05-12 PROCEDURE — 83001 ASSAY OF GONADOTROPIN (FSH): CPT | Performed by: FAMILY MEDICINE

## 2021-05-12 PROCEDURE — 36415 COLL VENOUS BLD VENIPUNCTURE: CPT | Performed by: FAMILY MEDICINE

## 2021-05-12 PROCEDURE — 80053 COMPREHEN METABOLIC PANEL: CPT | Performed by: FAMILY MEDICINE

## 2021-05-12 PROCEDURE — 80061 LIPID PANEL: CPT | Performed by: FAMILY MEDICINE

## 2021-05-12 PROCEDURE — 84443 ASSAY THYROID STIM HORMONE: CPT | Performed by: FAMILY MEDICINE

## 2021-05-12 RX ORDER — TEMAZEPAM 15 MG/1
30 CAPSULE ORAL
Qty: 60 CAPSULE | Refills: 5 | Status: SHIPPED | OUTPATIENT
Start: 2021-05-12 | End: 2022-06-10

## 2021-05-12 RX ORDER — MELOXICAM 7.5 MG/1
7.5 TABLET ORAL DAILY
Qty: 30 TABLET | Refills: 5 | Status: SHIPPED | OUTPATIENT
Start: 2021-05-12 | End: 2022-06-10

## 2021-05-12 RX ORDER — LORAZEPAM 0.5 MG/1
0.5 TABLET ORAL DAILY
Qty: 30 TABLET | Refills: 0 | Status: SHIPPED | OUTPATIENT
Start: 2021-05-12 | End: 2022-06-10

## 2021-05-12 RX ORDER — SUMATRIPTAN 50 MG/1
50 TABLET, FILM COATED ORAL
Qty: 18 TABLET | Refills: 3 | Status: SHIPPED | OUTPATIENT
Start: 2021-05-12 | End: 2022-06-10

## 2021-05-12 RX ORDER — MELOXICAM 7.5 MG/1
7.5 TABLET ORAL DAILY
COMMUNITY
End: 2021-08-04

## 2021-05-12 ASSESSMENT — MIFFLIN-ST. JEOR: SCORE: 1380.39

## 2021-05-12 NOTE — PROGRESS NOTES
"    Assessment & Plan     Fibromyalgia  Stable no change in treatment plan.   - meloxicam (MOBIC) 7.5 MG tablet; Take 1 tablet (7.5 mg) by mouth daily    Primary insomnia  Stable no change in treatment plan.   - temazepam (RESTORIL) 15 MG capsule; Take 2 capsules (30 mg) by mouth nightly as needed for sleep    Intractable migraine with aura without status migrainosus  No migraines all year   Stable no change in treatment plan.   - SUMAtriptan (IMITREX) 50 MG tablet; Take 1 tablet (50 mg) by mouth at onset of headache for migraine May repeat dose in 2 hours.  Do not exceed 200 mg in 24 hours    Anxiety  Stable no change in treatment plan.   - LORazepam (ATIVAN) 0.5 MG tablet; Take 1 tablet (0.5 mg) by mouth daily For panic or for flying  - Comprehensive metabolic panel  - TSH with free T4 reflex    Menopause  Occasional hot flash   Has one ovary left   - Lipid panel reflex to direct LDL Fasting  - Follicle stimulating hormone    Encounter for screening mammogram for breast cancer    - MA SCREENING DIGITAL BILAT - Future  (s+30); Future             BMI:   Estimated body mass index is 27.7 kg/m  as calculated from the following:    Height as of this encounter: 1.664 m (5' 5.5\").    Weight as of this encounter: 76.7 kg (169 lb).       Patient Instructions   Labs today     Medications are refilled     No changes     Set up mammogram     Colon cancer screen due in 5 years       Return in about 1 year (around 5/12/2022) for Routine Preventative Visit.    Lakeisha Haley MD  [facility]    Hailee Frost is a 55 year old who presents for the following health issues     HPI     Migraine     Since your last clinic visit, how have your headaches changed?  No change    How often are you getting headaches or migraines? Seasonal     Are you able to do normal daily activities when you have a migraine? No    Are you taking rescue/relief medications? (Select all that apply) sumatriptan " "(Imitrex)    How helpful is your rescue/relief medication?  I get total relief    Are you taking any medications to prevent migraines? (Select all that apply)  No    In the past 4 weeks, how often have you gone to urgent care or the emergency room because of your headaches?  0    Medication Followup of Ativan, Mobic and Temazepam    All using PRN    Sleeping well with 1-2 temazepam     Uses the ativan rarely for flying       Fibromyalgia is well controlled with PRN use of mobic   She will take at bedtime as she does wake often with stiffness and general muscle pain       Review of Systems   Constitutional, HEENT, cardiovascular, pulmonary, gi and gu systems are negative, except as otherwise noted.      Objective    /76   Temp 98.6  F (37  C) (Tympanic)   Ht 1.664 m (5' 5.5\")   Wt 76.7 kg (169 lb)   BMI 27.70 kg/m    Body mass index is 27.7 kg/m .  Physical Exam   GENERAL APPEARANCE: healthy, alert and no distress  PSYCH: mentation appears normal and affect normal/bright                  "

## 2021-05-12 NOTE — PATIENT INSTRUCTIONS
Labs today     Medications are refilled     No changes     Set up mammogram     Colon cancer screen due in 5 years

## 2021-05-12 NOTE — NURSING NOTE
"Chief Complaint   Patient presents with     Headache     /76   Temp 98.6  F (37  C) (Tympanic)   Ht 1.664 m (5' 5.5\")   Wt 76.7 kg (169 lb)   BMI 27.70 kg/m   Estimated body mass index is 27.7 kg/m  as calculated from the following:    Height as of this encounter: 1.664 m (5' 5.5\").    Weight as of this encounter: 76.7 kg (169 lb).  Patient presents to the clinic using No DME      Health Maintenance that is potentially due pending provider review:    Health Maintenance Due   Topic Date Due     PREVENTIVE CARE VISIT  Never done     ANNUAL REVIEW OF HM ORDERS  Never done     ADVANCE CARE PLANNING  Never done     MAMMO SCREENING  Never done     MIGRAINE ACTION PLAN  Never done     HIV SCREENING  Never done     COLORECTAL CANCER SCREENING  02/15/2021        Pended, Colonoscopy isn't due until 2026.        "

## 2021-05-20 NOTE — TELEPHONE ENCOUNTER
Reason for call:  Patient reporting a symptom    Symptom or request: Sore Throat 10 days. Pt was seen a week ago Neg Strep. Pt now has swollen glands sore throat due to swollen glands, cold symptoms, No temp    Have you been treated for this before? Yes    Phone Number patient can be reached at:  Home number on file [phone] (home)    Best Time:  Any Time      Can we leave a detailed message on this number:  YES    Call taken on 12/12/2018 at 8:16 AM by Isi Lam     DISCHARGE

## 2021-08-04 ENCOUNTER — MYC MEDICAL ADVICE (OUTPATIENT)
Dept: FAMILY MEDICINE | Facility: CLINIC | Age: 56
End: 2021-08-04

## 2021-08-04 ENCOUNTER — OFFICE VISIT (OUTPATIENT)
Dept: FAMILY MEDICINE | Facility: CLINIC | Age: 56
End: 2021-08-04
Payer: COMMERCIAL

## 2021-08-04 VITALS
TEMPERATURE: 98.7 F | RESPIRATION RATE: 12 BRPM | SYSTOLIC BLOOD PRESSURE: 122 MMHG | DIASTOLIC BLOOD PRESSURE: 70 MMHG | BODY MASS INDEX: 29.17 KG/M2 | WEIGHT: 178 LBS | HEART RATE: 96 BPM

## 2021-08-04 DIAGNOSIS — R07.89 CHEST WALL PAIN: Primary | ICD-10-CM

## 2021-08-04 PROCEDURE — 99214 OFFICE O/P EST MOD 30 MIN: CPT | Performed by: FAMILY MEDICINE

## 2021-08-04 NOTE — NURSING NOTE
"Chief Complaint   Patient presents with     Chest Pain     Rib pain     /70   Pulse 96   Temp 98.7  F (37.1  C) (Tympanic)   Resp 12   Wt 80.7 kg (178 lb)   BMI 29.17 kg/m   Estimated body mass index is 29.17 kg/m  as calculated from the following:    Height as of 5/12/21: 1.664 m (5' 5.5\").    Weight as of this encounter: 80.7 kg (178 lb).  Patient presents to the clinic using No DME      Health Maintenance that is potentially due pending provider review:    Health Maintenance Due   Topic Date Due     PREVENTIVE CARE VISIT  Never done     ANNUAL REVIEW OF HM ORDERS  Never done     ADVANCE CARE PLANNING  Never done     MAMMO SCREENING  Never done     MIGRAINE ACTION PLAN  Never done     HIV SCREENING  Never done        n/a        "

## 2021-08-04 NOTE — PATIENT INSTRUCTIONS
Take the tizanadine during the day for muscle spasm    Take tramadol for pain     Ice alternating with heat for muscle spasm      20 minutes ice, 20 minutes heat, 20 minutes ice 3 times daily

## 2021-08-04 NOTE — PROGRESS NOTES
Assessment & Plan     Chest wall pain  Trial of the below during the day and flexeril at night   - tiZANidine (ZANAFLEX) 4 MG tablet; Take 1 tablet (4 mg) by mouth 3 times daily             Patient Instructions   Take the tizanadine during the day for muscle spasm    Take tramadol for pain     Ice alternating with heat for muscle spasm      20 minutes ice, 20 minutes heat, 20 minutes ice 3 times daily       No follow-ups on file.    Lakeisha Haley MD  [facility]    Hailee Frost is a 55 year old who presents for the following health issues     HPI     Rib Pain x 1 week  Pt was pushing a 110# dog into a crate  Last Tuesday, July 27,  I pulled a muscle in my chest and/or displaced a few ribs.  I've done this many times before and it usually heals itself in a few days.  It's not healing this time.  The pain is a cutting sensation when I lift my arm or twist my body (think looking over the shoulder to check the blind spot while driving).  I can lay flat on my back, but cannot lay on my stomach.  Generally, most movement of the right side hurts.  The pain is starting to seize up my whole upper body on that side and goes over my shoulder, up my neck and down my arm.    Flexeril and advil x 3 days  Temazepam and mobic at night  She is really uncomfortable with turns and twist and deep breath   Feels more on the right then the left     This has happened before           Review of Systems   Constitutional, HEENT, cardiovascular, pulmonary, gi and gu systems are negative, except as otherwise noted.      Objective    /70   Pulse 96   Temp 98.7  F (37.1  C) (Tympanic)   Resp 12   Wt 80.7 kg (178 lb)   BMI 29.17 kg/m    Body mass index is 29.17 kg/m .  Physical Exam   GENERAL APPEARANCE: healthy, alert and no distress  RESP: lungs clear to auscultation - no rales, rhonchi or wheezes  CV: regular rates and rhythm, normal S1 S2, no S3 or S4 and no murmur, click or rub  Tender to  palpation along the right CSB    PSYCH: mentation appears normal and affect normal/bright

## 2022-02-12 ENCOUNTER — HEALTH MAINTENANCE LETTER (OUTPATIENT)
Age: 57
End: 2022-02-12

## 2022-04-09 ENCOUNTER — HEALTH MAINTENANCE LETTER (OUTPATIENT)
Age: 57
End: 2022-04-09

## 2022-06-01 NOTE — PROGRESS NOTES
Margot Kendrick  :  1965  DOS: 6/3/2022  MRN: [id number]    Sports Medicine Clinic Visit    PCP: Lakeisha Haley    Margot Kendrick is a 56 year old female who is seen in consultation at the request of Lakeisha Haley M.D. presenting with left hip pain.    Injury: Has chronic left hip pain. Pain over the anterior left hip with abduction. Internal and external rotation do not hurt. Takes arthritis medication. Notes some catching and instability upon pivoting on her left leg. Lack of ROM in abduction. 10/10 pain at worst. Symptoms are worse with: getting out of a car, pivoting on her right leg.  Other evaluation and/or treatments so far consists of: arthitis medication.  Recent imaging completed: No recent imaging completed.  Prior History of related problems: Chronic    Social History: Works in finance    Review of Systems  Musculoskeletal: as above  Remainder of review of systems is negative including constitutional, CV, pulmonary, GI, Skin and Neurologic except as noted in HPI or medical history.    Past Medical History:   Diagnosis Date     Arthritis      Esophageal reflux     GERD     Migraine, unspecified, without mention of intractable migraine without mention of status migrainosus      Mitral valve disorders(424.0)     thought possible MVP, not documented     Myalgia and myositis, unspecified     Fibromyalgia     Past Surgical History:   Procedure Laterality Date     ABDOMEN SURGERY      Right Sanjiv-Collectomy     APPENDECTOMY      With collectomy     COLONOSCOPY       COLONOSCOPY  2012    Procedure: COLONOSCOPY;  Colonoscopy;  Surgeon: Jun Reddy MD;  Location: [facility]     COLONOSCOPY N/A 2/15/2016    Procedure: COLONOSCOPY;  Surgeon: Anson Sethi MD;  Location: [facility]     ENT SURGERY       ENT SURGERY      Sinus     ESOPHAGOSCOPY, GASTROSCOPY, DUODENOSCOPY (EGD), COMBINED N/A 10/8/2015    Procedure: COMBINED ESOPHAGOSCOPY, GASTROSCOPY,  DUODENOSCOPY (EGD), BIOPSY SINGLE OR MULTIPLE;  Surgeon: Anson Sethi MD;  Location: [facility]     EYE SURGERY  2015    Recurrent corneal erosion     HYSTERECTOMY, VAGINAL  2002     LAPAROSCOPIC CHOLECYSTECTOMY N/A 10/27/2015    Procedure: LAPAROSCOPIC CHOLECYSTECTOMY;  Surgeon: Anson Sethi MD;  Location: [facility]     ORTHOPEDIC SURGERY       ORTHOPEDIC SURGERY      Left shoulder/Right Shoulder/Right Wrist x 3     SURGICAL HISTORY OF -   ,,         SURGICAL HISTORY OF -   1993    Excision (L) ganglion cyst     SURGICAL HISTORY OF -   1993    Tubal ligation     SURGICAL HISTORY OF -   1998    Septoplasty/sinus surgery     SURGICAL HISTORY OF -       TVH     SURGICAL HISTORY OF -   2004/ /15/2005    Colonoscopy     SURGICAL HISTORY OF -   2001    Gastroscopy     SURGICAL HISTORY OF -       Septoplasty     SURGICAL HISTORY OF -       rt craig colectomy     SURGICAL HISTORY OF -       Open distal clavicle resection with subacromial decompression     SURGICAL HISTORY OF -   2008    rt wrist TFCC     SURGICAL HISTORY OF -   2008    rt wrist removal of scar tissue     SURGICAL HISTORY OF -   2009    Arthroscopic subacromial decompression with introduction of On-Q pain pump.      Family History   Problem Relation Age of Onset     C.A.D. Maternal Grandmother      Arthritis Maternal Grandmother         rheumatoid arthritis     Diabetes Maternal Grandmother      Cardiovascular Maternal Grandfather      Coronary Artery Disease Maternal Grandfather      Cardiovascular Paternal Grandmother      Cardiovascular Paternal Grandfather      Respiratory Daughter         ASTHMA     Neurologic Disorder Father         FIBROMYALGIA     Heart Disease Father 68     C.A.D. Father 68        MI     Osteoporosis Father      Neurologic Disorder Brother         FIBROMYALGIA     Neurologic Disorder Sister         FIBROMYALGIA     Anxiety Disorder Sister       Thyroid Disease Mother      Hypertension Mother      Hyperlipidemia Mother      Arthritis Maternal Aunt         psoriatic     Diabetes Other         Maternal Aunt     Coronary Artery Disease Other         Maternal Aunt     Hypertension Other         Maternal Aunt     Hyperlipidemia Other         Maternal Aunt     Melanoma No family hx of        Objective  /77   Wt 83.5 kg (184 lb)   BMI 30.15 kg/m        General: healthy, alert and in no distress      HEENT: no scleral icterus or conjunctival erythema     Skin: no suspicious lesions or rash. No jaundice.     CV: regular rhythm by palpation, 2+ distal pulses, no pedal edema      Resp: normal respiratory effort without conversational dyspnea     Psych: normal mood and affect      Gait: nonantalgic, appropriate coordination and balance     Neuro: normal light touch sensory exam of the extremities. Motor strength as noted below       Left hip exam    Inspection:        no edema or ecchymosis in hip area    ROM:       Full active and passive ROM       Mild, inconsistent pain with abduction active>passive, flexion active>passive    Strength:        flexion 5/5       extension 5/5       abduction 5/5       adduction 5/5    Tender:        Minimal TTP on exam, patient localizes pain to anterior hip/groin more medially    Non Tender:        remainder of hip area       illiac crest       ASIS       pubis       greater trochanter       SI joint    Sensation:        grossly intact in hip and thigh    Skin:       well perfused       capillary refill brisk    Special Tests:        neg (-) DEV       equivocal FADIR       neg (-) scour       Mild pain with log roll       neg (-) Kyler    Radiology  Recent Results (from the past 744 hour(s))   XR Pelvis w Hip LT 1 View    Narrative    XR PELVIS AND HIP LEFT 1 VIEW 6/3/2022 8:19 AM     HISTORY: Hip pain, left    COMPARISON: None.      Impression    IMPRESSION: Minimal hypertrophic change of both hip joints without  joint  space narrowing. No fracture or osseous lesion. Degenerative  changes lower lumbar spine.    JORGE MASSEY MD         SYSTEM ID:  [id number]       Assessment:  1. Chronic left hip pain    2. Psoas tendinitis of left side    3. Hip impingement syndrome, left        Plan:  Discussed the assessment with the patient.  Follow up: prn based on clinical progress, plan for f/u in one month if pain not improved, sooner if more severe  Chronic left groin pain, exam with mild signs of hip joint pain, mild signs of psoas tendinitis, neither produces exquisite pain on exam  Reviewed options for further diagnostic and therapeutic goals, PT +/- trial of injection vs advanced imaging  Relatively benign XR today, mild signs of pincer type FREDDY which could be contributory, very mild DJD changes in b/l hips  XR images independently visualized and reviewed with patient today in clinic  She would like to try PT first, advised working with Olga Lidia Stone or [facility] in [place]  We discussed modified progressive pain-free activity as tolerated  Home handouts provided and supportive care reviewed  All questions were answered today  Contact us with additional questions or concerns  Signs and sx of concern reviewed    Thanks very much for sending this nice lady to us, I will keep you updated with her progress      Jorge Lacy DO, [facility]  Sports Medicine Physician  [facility]            Disclaimer: This note consists of symbols derived from keyboarding, dictation and/or voice recognition software. As a result, there may be errors in the script that have gone undetected. Please consider this when interpreting information found in this chart.

## 2022-06-03 ENCOUNTER — OFFICE VISIT (OUTPATIENT)
Dept: ORTHOPEDICS | Facility: CLINIC | Age: 57
End: 2022-06-03
Payer: COMMERCIAL

## 2022-06-03 VITALS — BODY MASS INDEX: 30.15 KG/M2 | SYSTOLIC BLOOD PRESSURE: 132 MMHG | WEIGHT: 184 LBS | DIASTOLIC BLOOD PRESSURE: 77 MMHG

## 2022-06-03 DIAGNOSIS — M25.852 HIP IMPINGEMENT SYNDROME, LEFT: ICD-10-CM

## 2022-06-03 DIAGNOSIS — M76.12 PSOAS TENDINITIS OF LEFT SIDE: ICD-10-CM

## 2022-06-03 DIAGNOSIS — M25.552 CHRONIC LEFT HIP PAIN: Primary | ICD-10-CM

## 2022-06-03 DIAGNOSIS — G89.29 CHRONIC LEFT HIP PAIN: Primary | ICD-10-CM

## 2022-06-03 PROCEDURE — 99203 OFFICE O/P NEW LOW 30 MIN: CPT | Performed by: FAMILY MEDICINE

## 2022-06-03 ASSESSMENT — PAIN SCALES - GENERAL: PAINLEVEL: NO PAIN (0)

## 2022-06-03 NOTE — LETTER
6/3/2022         RE: Margot Kendrick  [address] [facility] [address]        Dear Colleague,    Thank you for referring your patient, Margot Kendrick, to the [facility]. Please see a copy of my visit note below.    Margot Kendrick  :  1965  DOS: 6/3/2022  MRN: [id number]    Sports Medicine Clinic Visit    PCP: Lakeisha Haley    Margot Kendrick is a 56 year old female who is seen in consultation at the request of Lakeisha Haley M.D. presenting with left hip pain.    Injury: Has chronic left hip pain. Pain over the anterior left hip with abduction. Internal and external rotation do not hurt. Takes arthritis medication. Notes some catching and instability upon pivoting on her left leg. Lack of ROM in abduction. 10/10 pain at worst. Symptoms are worse with: getting out of a car, pivoting on her right leg.  Other evaluation and/or treatments so far consists of: arthitis medication.  Recent imaging completed: No recent imaging completed.  Prior History of related problems: Chronic    Social History: Works in finance    Review of Systems  Musculoskeletal: as above  Remainder of review of systems is negative including constitutional, CV, pulmonary, GI, Skin and Neurologic except as noted in HPI or medical history.    Past Medical History:   Diagnosis Date     Arthritis      Esophageal reflux     GERD     Migraine, unspecified, without mention of intractable migraine without mention of status migrainosus      Mitral valve disorders(424.0)     thought possible MVP, not documented     Myalgia and myositis, unspecified     Fibromyalgia     Past Surgical History:   Procedure Laterality Date     ABDOMEN SURGERY      Right Sanjiv-Collectomy     APPENDECTOMY      With collectomy     COLONOSCOPY       COLONOSCOPY  2012    Procedure: COLONOSCOPY;  Colonoscopy;  Surgeon: Jun Reddy MD;  Location:  [facility]     COLONOSCOPY N/A 2/15/2016    Procedure: COLONOSCOPY;  Surgeon: Anson Sethi MD;  Location: [facility]     ENT SURGERY       ENT SURGERY      Sinus     ESOPHAGOSCOPY, GASTROSCOPY, DUODENOSCOPY (EGD), COMBINED N/A 10/8/2015    Procedure: COMBINED ESOPHAGOSCOPY, GASTROSCOPY, DUODENOSCOPY (EGD), BIOPSY SINGLE OR MULTIPLE;  Surgeon: Anson Sethi MD;  Location: [facility]     EYE SURGERY      Recurrent corneal erosion     HYSTERECTOMY, VAGINAL  2002     LAPAROSCOPIC CHOLECYSTECTOMY N/A 10/27/2015    Procedure: LAPAROSCOPIC CHOLECYSTECTOMY;  Surgeon: Anson Sethi MD;  Location: [facility]     ORTHOPEDIC SURGERY       ORTHOPEDIC SURGERY  /    Left shoulder/Right Shoulder/Right Wrist x 3     SURGICAL HISTORY OF -   ,,         SURGICAL HISTORY OF -   1993    Excision (L) ganglion cyst     SURGICAL HISTORY OF -   1993    Tubal ligation     SURGICAL HISTORY OF -   1998    Septoplasty/sinus surgery     SURGICAL HISTORY OF -       TVH     SURGICAL HISTORY OF -   2004/ /15/2005    Colonoscopy     SURGICAL HISTORY OF -   2001    Gastroscopy     SURGICAL HISTORY OF -       Septoplasty     SURGICAL HISTORY OF -       rt craig colectomy     SURGICAL HISTORY OF -       Open distal clavicle resection with subacromial decompression     SURGICAL HISTORY OF -   2008    rt wrist TFCC     SURGICAL HISTORY OF -   2008    rt wrist removal of scar tissue     SURGICAL HISTORY OF -   2009    Arthroscopic subacromial decompression with introduction of On-Q pain pump.      Family History   Problem Relation Age of Onset     C.A.D. Maternal Grandmother      Arthritis Maternal Grandmother         rheumatoid arthritis     Diabetes Maternal Grandmother      Cardiovascular Maternal Grandfather      Coronary Artery Disease Maternal Grandfather      Cardiovascular Paternal Grandmother      Cardiovascular Paternal Grandfather      Respiratory Daughter          ASTHMA     Neurologic Disorder Father         FIBROMYALGIA     Heart Disease Father 68     C.A.D. Father 68        MI     Osteoporosis Father      Neurologic Disorder Brother         FIBROMYALGIA     Neurologic Disorder Sister         FIBROMYALGIA     Anxiety Disorder Sister      Thyroid Disease Mother      Hypertension Mother      Hyperlipidemia Mother      Arthritis Maternal Aunt         psoriatic     Diabetes Other         Maternal Aunt     Coronary Artery Disease Other         Maternal Aunt     Hypertension Other         Maternal Aunt     Hyperlipidemia Other         Maternal Aunt     Melanoma No family hx of        Objective  /77   Wt 83.5 kg (184 lb)   BMI 30.15 kg/m        General: healthy, alert and in no distress      HEENT: no scleral icterus or conjunctival erythema     Skin: no suspicious lesions or rash. No jaundice.     CV: regular rhythm by palpation, 2+ distal pulses, no pedal edema      Resp: normal respiratory effort without conversational dyspnea     Psych: normal mood and affect      Gait: nonantalgic, appropriate coordination and balance     Neuro: normal light touch sensory exam of the extremities. Motor strength as noted below       Left hip exam    Inspection:        no edema or ecchymosis in hip area    ROM:       Full active and passive ROM       Mild, inconsistent pain with abduction active>passive, flexion active>passive    Strength:        flexion 5/5       extension 5/5       abduction 5/5       adduction 5/5    Tender:        Minimal TTP on exam, patient localizes pain to anterior hip/groin more medially    Non Tender:        remainder of hip area       illiac crest       ASIS       pubis       greater trochanter       SI joint    Sensation:        grossly intact in hip and thigh    Skin:       well perfused       capillary refill brisk    Special Tests:        neg (-) DEV       equivocal FADIR       neg (-) scour       Mild pain with log roll       neg (-)  Kyler    Radiology  Recent Results (from the past 744 hour(s))   XR Pelvis w Hip LT 1 View    Narrative    XR PELVIS AND HIP LEFT 1 VIEW 6/3/2022 8:19 AM     HISTORY: Hip pain, left    COMPARISON: None.      Impression    IMPRESSION: Minimal hypertrophic change of both hip joints without  joint space narrowing. No fracture or osseous lesion. Degenerative  changes lower lumbar spine.    JORGE MASSEY MD         SYSTEM ID:  [id number]       Assessment:  1. Chronic left hip pain    2. Psoas tendinitis of left side    3. Hip impingement syndrome, left        Plan:  Discussed the assessment with the patient.  Follow up: prn based on clinical progress, plan for f/u in one month if pain not improved, sooner if more severe  Chronic left groin pain, exam with mild signs of hip joint pain, mild signs of psoas tendinitis, neither produces exquisite pain on exam  Reviewed options for further diagnostic and therapeutic goals, PT +/- trial of injection vs advanced imaging  Relatively benign XR today, mild signs of pincer type FREDDY which could be contributory, very mild DJD changes in b/l hips  XR images independently visualized and reviewed with patient today in clinic  She would like to try PT first, advised working with Olga Lidia Stone or [facility] in [place]  We discussed modified progressive pain-free activity as tolerated  Home handouts provided and supportive care reviewed  All questions were answered today  Contact us with additional questions or concerns  Signs and sx of concern reviewed    Thanks very much for sending this nice lady to us, I will keep you updated with her progress      Jorge Lacy DO, [facility]  Sports Medicine Physician  [facility]            Disclaimer: This note consists of symbols derived from keyboarding, dictation and/or voice recognition software. As a result, there may be errors in the script that have gone undetected. Please consider this when interpreting information  found in this chart.        Again, thank you for allowing me to participate in the care of your patient.        Sincerely,        Jorge Lacy, DO

## 2022-06-09 ASSESSMENT — ENCOUNTER SYMPTOMS
DYSURIA: 0
ARTHRALGIAS: 1
JOINT SWELLING: 0
EYE PAIN: 1
NERVOUS/ANXIOUS: 0
HEMATOCHEZIA: 0
PARESTHESIAS: 0
SORE THROAT: 0
COUGH: 0
SHORTNESS OF BREATH: 0
HEADACHES: 1
ABDOMINAL PAIN: 0
BREAST MASS: 0
NAUSEA: 0
FEVER: 0
PALPITATIONS: 0
DIARRHEA: 0
CHILLS: 0
MYALGIAS: 1
HEARTBURN: 1
DIZZINESS: 0
FREQUENCY: 0
HEMATURIA: 0
WEAKNESS: 0
CONSTIPATION: 0

## 2022-06-10 ENCOUNTER — OFFICE VISIT (OUTPATIENT)
Dept: FAMILY MEDICINE | Facility: CLINIC | Age: 57
End: 2022-06-10
Payer: COMMERCIAL

## 2022-06-10 ENCOUNTER — MYC REFILL (OUTPATIENT)
Dept: FAMILY MEDICINE | Facility: CLINIC | Age: 57
End: 2022-06-10

## 2022-06-10 VITALS
SYSTOLIC BLOOD PRESSURE: 124 MMHG | BODY MASS INDEX: 31.16 KG/M2 | DIASTOLIC BLOOD PRESSURE: 66 MMHG | HEART RATE: 97 BPM | RESPIRATION RATE: 18 BRPM | TEMPERATURE: 98.1 F | HEIGHT: 65 IN | WEIGHT: 187 LBS | OXYGEN SATURATION: 97 %

## 2022-06-10 DIAGNOSIS — F41.9 ANXIETY: ICD-10-CM

## 2022-06-10 DIAGNOSIS — Z00.00 ROUTINE GENERAL MEDICAL EXAMINATION AT A HEALTH CARE FACILITY: Primary | ICD-10-CM

## 2022-06-10 DIAGNOSIS — M79.7 FIBROMYALGIA: ICD-10-CM

## 2022-06-10 DIAGNOSIS — Z12.31 VISIT FOR SCREENING MAMMOGRAM: ICD-10-CM

## 2022-06-10 DIAGNOSIS — M25.50 MULTIPLE JOINT PAIN: ICD-10-CM

## 2022-06-10 DIAGNOSIS — G43.119 INTRACTABLE MIGRAINE WITH AURA WITHOUT STATUS MIGRAINOSUS: ICD-10-CM

## 2022-06-10 DIAGNOSIS — F51.01 PRIMARY INSOMNIA: ICD-10-CM

## 2022-06-10 DIAGNOSIS — R74.8 ELEVATED LIVER ENZYMES: ICD-10-CM

## 2022-06-10 LAB
ALBUMIN SERPL-MCNC: 3.7 G/DL (ref 3.4–5)
ALP SERPL-CCNC: 46 U/L (ref 40–150)
ALT SERPL W P-5'-P-CCNC: 100 U/L (ref 0–50)
ANION GAP SERPL CALCULATED.3IONS-SCNC: 4 MMOL/L (ref 3–14)
AST SERPL W P-5'-P-CCNC: 54 U/L (ref 0–45)
BILIRUB SERPL-MCNC: 0.8 MG/DL (ref 0.2–1.3)
BUN SERPL-MCNC: 10 MG/DL (ref 7–30)
CALCIUM SERPL-MCNC: 8.7 MG/DL (ref 8.5–10.1)
CHLORIDE BLD-SCNC: 110 MMOL/L (ref 94–109)
CHOLEST SERPL-MCNC: 188 MG/DL
CO2 SERPL-SCNC: 25 MMOL/L (ref 20–32)
CREAT SERPL-MCNC: 0.74 MG/DL (ref 0.52–1.04)
CRP SERPL-MCNC: <2.9 MG/L (ref 0–8)
ERYTHROCYTE [DISTWIDTH] IN BLOOD BY AUTOMATED COUNT: 12.6 % (ref 10–15)
ERYTHROCYTE [SEDIMENTATION RATE] IN BLOOD BY WESTERGREN METHOD: 4 MM/HR (ref 0–30)
FASTING STATUS PATIENT QL REPORTED: NO
FSH SERPL-ACNC: 3.4 IU/L
GFR SERPL CREATININE-BSD FRML MDRD: >90 ML/MIN/1.73M2
GLUCOSE BLD-MCNC: 131 MG/DL (ref 70–99)
HCT VFR BLD AUTO: 42.1 % (ref 35–47)
HDLC SERPL-MCNC: 33 MG/DL
HGB BLD-MCNC: 14.1 G/DL (ref 11.7–15.7)
LDLC SERPL CALC-MCNC: 109 MG/DL
MCH RBC QN AUTO: 31.1 PG (ref 26.5–33)
MCHC RBC AUTO-ENTMCNC: 33.5 G/DL (ref 31.5–36.5)
MCV RBC AUTO: 93 FL (ref 78–100)
NONHDLC SERPL-MCNC: 155 MG/DL
PLATELET # BLD AUTO: 228 10E3/UL (ref 150–450)
POTASSIUM BLD-SCNC: 4.1 MMOL/L (ref 3.4–5.3)
PROT SERPL-MCNC: 7.1 G/DL (ref 6.8–8.8)
RBC # BLD AUTO: 4.53 10E6/UL (ref 3.8–5.2)
SODIUM SERPL-SCNC: 139 MMOL/L (ref 133–144)
TRIGL SERPL-MCNC: 230 MG/DL
TSH SERPL DL<=0.005 MIU/L-ACNC: 1.17 MU/L (ref 0.4–4)
WBC # BLD AUTO: 4.9 10E3/UL (ref 4–11)

## 2022-06-10 PROCEDURE — 86140 C-REACTIVE PROTEIN: CPT | Performed by: FAMILY MEDICINE

## 2022-06-10 PROCEDURE — 80053 COMPREHEN METABOLIC PANEL: CPT | Performed by: FAMILY MEDICINE

## 2022-06-10 PROCEDURE — 85652 RBC SED RATE AUTOMATED: CPT | Performed by: FAMILY MEDICINE

## 2022-06-10 PROCEDURE — 86431 RHEUMATOID FACTOR QUANT: CPT | Performed by: FAMILY MEDICINE

## 2022-06-10 PROCEDURE — 84443 ASSAY THYROID STIM HORMONE: CPT | Performed by: FAMILY MEDICINE

## 2022-06-10 PROCEDURE — 86200 CCP ANTIBODY: CPT | Performed by: FAMILY MEDICINE

## 2022-06-10 PROCEDURE — 86038 ANTINUCLEAR ANTIBODIES: CPT | Performed by: FAMILY MEDICINE

## 2022-06-10 PROCEDURE — 99396 PREV VISIT EST AGE 40-64: CPT | Performed by: FAMILY MEDICINE

## 2022-06-10 PROCEDURE — 85027 COMPLETE CBC AUTOMATED: CPT | Performed by: FAMILY MEDICINE

## 2022-06-10 PROCEDURE — 36415 COLL VENOUS BLD VENIPUNCTURE: CPT | Performed by: FAMILY MEDICINE

## 2022-06-10 PROCEDURE — 83001 ASSAY OF GONADOTROPIN (FSH): CPT | Performed by: FAMILY MEDICINE

## 2022-06-10 PROCEDURE — 80061 LIPID PANEL: CPT | Performed by: FAMILY MEDICINE

## 2022-06-10 PROCEDURE — 99214 OFFICE O/P EST MOD 30 MIN: CPT | Mod: 25 | Performed by: FAMILY MEDICINE

## 2022-06-10 RX ORDER — TEMAZEPAM 15 MG/1
30 CAPSULE ORAL
Qty: 60 CAPSULE | Refills: 5 | Status: SHIPPED | OUTPATIENT
Start: 2022-06-10 | End: 2022-12-19

## 2022-06-10 RX ORDER — SUMATRIPTAN 50 MG/1
50 TABLET, FILM COATED ORAL
Qty: 18 TABLET | Refills: 3 | Status: SHIPPED | OUTPATIENT
Start: 2022-06-10 | End: 2023-06-07

## 2022-06-10 RX ORDER — MELOXICAM 7.5 MG/1
7.5 TABLET ORAL DAILY
Qty: 30 TABLET | Refills: 5 | Status: SHIPPED | OUTPATIENT
Start: 2022-06-10 | End: 2022-12-19

## 2022-06-10 ASSESSMENT — PAIN SCALES - GENERAL: PAINLEVEL: NO PAIN (0)

## 2022-06-10 ASSESSMENT — ENCOUNTER SYMPTOMS
ABDOMINAL PAIN: 0
DIARRHEA: 0
PALPITATIONS: 0
ARTHRALGIAS: 1
PARESTHESIAS: 0
CHILLS: 0
DIZZINESS: 0
HEADACHES: 1
FEVER: 0
FREQUENCY: 0
EYE PAIN: 1
COUGH: 0
HEARTBURN: 1
MYALGIAS: 1
HEMATOCHEZIA: 0
SORE THROAT: 0
WEAKNESS: 0
SHORTNESS OF BREATH: 0
BREAST MASS: 0
NAUSEA: 0
DYSURIA: 0
HEMATURIA: 0
JOINT SWELLING: 0
NERVOUS/ANXIOUS: 0
CONSTIPATION: 0

## 2022-06-10 NOTE — PROGRESS NOTES
SUBJECTIVE:   CC: Margot Kendrick is an 56 year old woman who presents for preventive health visit.       Patient has been advised of split billing requirements and indicates understanding: Yes  Healthy Habits:     Getting at least 3 servings of Calcium per day:  NO    Bi-annual eye exam:  NO    Dental care twice a year:  Yes    Sleep apnea or symptoms of sleep apnea:  Daytime drowsiness, Excessive snoring and Sleep apnea    Diet:  Regular (no restrictions)    Frequency of exercise:  4-5 days/week    Duration of exercise:  15-30 minutes    Taking medications regularly:  Yes    Medication side effects:  None    PHQ-2 Total Score: 0    Additional concerns today:  No          Insomnia      Duration: years     Description  Frequency of insomnia:  nightly  She is using remeron and this works well for her       Musculoskeletal problem/pain      Duration: years     Description  Location: total body all joints and muscles has dx of fibro   But feels this is now in her joints   Stiff in the am in her hands   Notes some occasional swelling as well     Intensity:  moderate    Accompanying signs and symptoms: swelling    History  Previous similar problem: YES  Previous evaluation:  Labs      Precipitating or alleviating factors:  Trauma or overuse: no   Aggravating factors include: exercise, overuse and cold or damp weather    Therapies tried and outcome: rest/inactivity, heat, ice, exercises and physical therapy      Today's PHQ-2 Score:   PHQ-2 ( 1999 [employer]) 6/9/2022   Q1: Little interest or pleasure in doing things 0   Q2: Feeling down, depressed or hopeless 0   PHQ-2 Score 0   PHQ-2 Total Score (12-17 Years)- Positive if 3 or more points; Administer PHQ-A if positive -   Q1: Little interest or pleasure in doing things Not at all   Q2: Feeling down, depressed or hopeless Not at all   PHQ-2 Score 0       Abuse: Current or Past (Physical, Sexual or Emotional) - No  Do you feel safe in your environment?  Yes    Have you ever done Advance Care Planning? (For example, a Health Directive, POLST, or a discussion with a medical provider or your loved ones about your wishes): No, advance care planning information given to patient to review.  Patient declined advance care planning discussion at this time.    Social History     Tobacco Use     Smoking status: Former Smoker     Packs/day: 0.50     Types: Cigarettes     Smokeless tobacco: Never Used     Tobacco comment: quit plan offered   Substance Use Topics     Alcohol use: No     Alcohol/week: 0.0 standard drinks         Alcohol Use 6/9/2022   Prescreen: >3 drinks/day or >7 drinks/week? Not Applicable       Reviewed orders with patient.  Reviewed health maintenance and updated orders accordingly - Yes  Labs reviewed in EPIC    Breast Cancer Screening:    Breast CA Risk Assessment (FHS-7) 6/9/2022   Do you have a family history of breast, colon, or ovarian cancer? No / Unknown         Mammogram Screening: Recommended annual mammography  Pertinent mammograms are reviewed under the imaging tab.    History of abnormal Pap smear: NO - age 30-65 PAP every 5 years with negative HPV co-testing recommended     Reviewed and updated as needed this visit by clinical staff   Tobacco  Allergies  Meds  Problems  Med Hx  Surg Hx  Fam Hx  Soc   Hx          Reviewed and updated as needed this visit by Provider                       Review of Systems   Constitutional: Negative for chills and fever.   HENT: Positive for hearing loss. Negative for congestion, ear pain and sore throat.    Eyes: Positive for pain. Negative for visual disturbance.   Respiratory: Negative for cough and shortness of breath.    Cardiovascular: Positive for peripheral edema. Negative for chest pain and palpitations.   Gastrointestinal: Positive for heartburn. Negative for abdominal pain, constipation, diarrhea, hematochezia and nausea.   Breasts:  Negative for tenderness, breast mass and discharge.  "  Genitourinary: Negative for dysuria, frequency, genital sores, hematuria, pelvic pain, urgency, vaginal bleeding and vaginal discharge.   Musculoskeletal: Positive for arthralgias and myalgias. Negative for joint swelling.   Skin: Negative for rash.   Neurological: Positive for headaches. Negative for dizziness, weakness and paresthesias.   Psychiatric/Behavioral: Negative for mood changes. The patient is not nervous/anxious.      See hpi      OBJECTIVE:   /66   Pulse 97   Temp 98.1  F (36.7  C)   Resp 18   Ht 1.656 m (5' 5.18\")   Wt 84.8 kg (187 lb)   SpO2 97%   Breastfeeding No   BMI 30.95 kg/m    Physical Exam  GENERAL APPEARANCE: healthy, alert and no distress  EYES: Eyes grossly normal to inspection, PERRL and conjunctivae and sclerae normal  HENT: ear canals and TM's normal, nose and mouth without ulcers or lesions, oropharynx clear and oral mucous membranes moist  NECK: no adenopathy, no asymmetry, masses, or scars and thyroid normal to palpation  RESP: lungs clear to auscultation - no rales, rhonchi or wheezes  CV: regular rate and rhythm, normal S1 S2, no S3 or S4, no murmur, click or rub, no peripheral edema and peripheral pulses strong  ABDOMEN: soft, nontender, no hepatosplenomegaly, no masses and bowel sounds normal  MS: no musculoskeletal defects are noted and gait is age appropriate without ataxia  SKIN: no suspicious lesions or rashes  NEURO: Normal strength and tone, sensory exam grossly normal, mentation intact and speech normal  PSYCH: mentation appears normal and affect normal/bright    Diagnostic Test Results:  Labs reviewed in Epic    ASSESSMENT/PLAN:   (Z00.00) Routine general medical examination at a health care facility  (primary encounter diagnosis)  Comment:   Plan: REVIEW OF HEALTH MAINTENANCE PROTOCOL ORDERS,         Lipid panel reflex to direct LDL Fasting,         Follicle stimulating hormone            (G43.119) Intractable migraine with aura without status " "migrainosus  Comment: Stable no change in treatment plan.   Plan: SUMAtriptan (IMITREX) 50 MG tablet            (F51.01) Primary insomnia  Comment: Stable no change in treatment plan.   Plan: temazepam (RESTORIL) 15 MG capsule            (M79.7) Fibromyalgia  Comment: this works for her muscles but joints are not well controlled   Plan: meloxicam (MOBIC) 7.5 MG tablet            (M25.50) Multiple joint pain  Comment: will eval for RA or concer for other inflam arthritis   Plan: meloxicam (MOBIC) 7.5 MG tablet, Anti Nuclear         Tabitha IgG by IFA with Reflex, Rheumatoid factor,         Cyclic Citrullinated Peptide Antibody IgG,         Erythrocyte sedimentation rate auto, CRP         inflammation, CBC with platelets, Comprehensive        metabolic panel, TSH with free T4 reflex, Adult        Rheumatology  Referral            (R74.8) Elevated liver enzymes  Comment:   Plan: Comprehensive metabolic panel            (Z12.31) Visit for screening mammogram  Comment:   Plan: MA SCREENING DIGITAL BILAT - Future  (s+30)              Patient has been advised of split billing requirements and indicates understanding: Yes    COUNSELING:  Reviewed preventive health counseling, as reflected in patient instructions    Estimated body mass index is 30.95 kg/m  as calculated from the following:    Height as of this encounter: 1.656 m (5' 5.18\").    Weight as of this encounter: 84.8 kg (187 lb).    Weight management plan: Discussed healthy diet and exercise guidelines    She reports that she has quit smoking. Her smoking use included cigarettes. She smoked 0.50 packs per day. She has never used smokeless tobacco.      Counseling Resources:  ATP IV Guidelines  Pooled Cohorts Equation Calculator  Breast Cancer Risk Calculator  BRCA-Related Cancer Risk Assessment: FHS-7 Tool  FRAX Risk Assessment  ICSI Preventive Guidelines  Dietary Guidelines for Americans, 2010  USDA's MyPlate  ASA Prophylaxis  Lung CA Screening    Lakeisha " Sim Haley MD  [facility]

## 2022-06-10 NOTE — TELEPHONE ENCOUNTER
Requested Prescriptions   Pending Prescriptions Disp Refills     LORazepam (ATIVAN) 0.5 MG tablet 30 tablet 0     Sig: Take 1 tablet (0.5 mg) by mouth daily For panic or for flying       There is no refill protocol information for this order        Last Written Prescription Date:  5/12/22  Last Fill Quantity: 30,  # refills: 0   Last office visit: 6/10/2022 with prescribing provider:     Future Office Visit:

## 2022-06-13 LAB
ANA SER QL IF: NEGATIVE
CCP AB SER IA-ACNC: 1.5 U/ML
RHEUMATOID FACT SER NEPH-ACNC: <6 IU/ML

## 2022-06-13 RX ORDER — LORAZEPAM 0.5 MG/1
0.5 TABLET ORAL DAILY
Qty: 30 TABLET | Refills: 0 | Status: SHIPPED | OUTPATIENT
Start: 2022-06-13 | End: 2023-06-07

## 2022-06-16 PROBLEM — M25.50 MULTIPLE JOINT PAIN: Status: ACTIVE | Noted: 2022-06-16

## 2022-06-20 ENCOUNTER — HOSPITAL ENCOUNTER (OUTPATIENT)
Dept: MAMMOGRAPHY | Facility: CLINIC | Age: 57
Discharge: HOME | End: 2022-06-20
Attending: FAMILY MEDICINE | Admitting: FAMILY MEDICINE
Payer: COMMERCIAL

## 2022-06-20 DIAGNOSIS — Z12.31 VISIT FOR SCREENING MAMMOGRAM: ICD-10-CM

## 2022-06-20 PROCEDURE — 77067 SCR MAMMO BI INCL CAD: CPT

## 2022-06-28 ENCOUNTER — LAB (OUTPATIENT)
Dept: LAB | Facility: CLINIC | Age: 57
End: 2022-06-28
Payer: COMMERCIAL

## 2022-06-28 DIAGNOSIS — M79.7 FIBROMYALGIA: ICD-10-CM

## 2022-06-28 DIAGNOSIS — R74.8 ELEVATED LIVER ENZYMES: ICD-10-CM

## 2022-06-28 LAB
ALBUMIN SERPL-MCNC: 4 G/DL (ref 3.4–5)
ALP SERPL-CCNC: 43 U/L (ref 40–150)
ALT SERPL W P-5'-P-CCNC: 89 U/L (ref 0–50)
ANION GAP SERPL CALCULATED.3IONS-SCNC: 4 MMOL/L (ref 3–14)
AST SERPL W P-5'-P-CCNC: 51 U/L (ref 0–45)
BILIRUB SERPL-MCNC: 1 MG/DL (ref 0.2–1.3)
BUN SERPL-MCNC: 16 MG/DL (ref 7–30)
CALCIUM SERPL-MCNC: 9.1 MG/DL (ref 8.5–10.1)
CHLORIDE BLD-SCNC: 108 MMOL/L (ref 94–109)
CO2 SERPL-SCNC: 26 MMOL/L (ref 20–32)
CREAT SERPL-MCNC: 0.79 MG/DL (ref 0.52–1.04)
GFR SERPL CREATININE-BSD FRML MDRD: 88 ML/MIN/1.73M2
GLUCOSE BLD-MCNC: 112 MG/DL (ref 70–99)
POTASSIUM BLD-SCNC: 4 MMOL/L (ref 3.4–5.3)
PROT SERPL-MCNC: 7.4 G/DL (ref 6.8–8.8)
SODIUM SERPL-SCNC: 138 MMOL/L (ref 133–144)
VIT B12 SERPL-MCNC: 525 PG/ML (ref 232–1245)

## 2022-06-28 PROCEDURE — 82607 VITAMIN B-12: CPT

## 2022-06-28 PROCEDURE — 36415 COLL VENOUS BLD VENIPUNCTURE: CPT

## 2022-06-28 PROCEDURE — 80053 COMPREHEN METABOLIC PANEL: CPT

## 2022-07-06 ENCOUNTER — HOSPITAL ENCOUNTER (OUTPATIENT)
Dept: ULTRASOUND IMAGING | Facility: CLINIC | Age: 57
Discharge: HOME | End: 2022-07-06
Attending: FAMILY MEDICINE | Admitting: FAMILY MEDICINE
Payer: COMMERCIAL

## 2022-07-06 DIAGNOSIS — R74.8 ELEVATED LIVER ENZYMES: ICD-10-CM

## 2022-07-06 PROCEDURE — 76705 ECHO EXAM OF ABDOMEN: CPT

## 2022-09-13 ENCOUNTER — LAB (OUTPATIENT)
Dept: LAB | Facility: CLINIC | Age: 57
End: 2022-09-13
Payer: COMMERCIAL

## 2022-09-13 DIAGNOSIS — R74.8 ELEVATED LIVER ENZYMES: ICD-10-CM

## 2022-09-13 LAB
ALBUMIN SERPL BCG-MCNC: 4.4 G/DL (ref 3.5–5.2)
ALP SERPL-CCNC: 51 U/L (ref 35–104)
ALT SERPL W P-5'-P-CCNC: 58 U/L (ref 10–35)
ANION GAP SERPL CALCULATED.3IONS-SCNC: 11 MMOL/L (ref 7–15)
AST SERPL W P-5'-P-CCNC: 38 U/L (ref 10–35)
BILIRUB SERPL-MCNC: 0.7 MG/DL
BUN SERPL-MCNC: 18.7 MG/DL (ref 6–20)
CALCIUM SERPL-MCNC: 9.3 MG/DL (ref 8.6–10)
CHLORIDE SERPL-SCNC: 104 MMOL/L (ref 98–107)
CREAT SERPL-MCNC: 0.85 MG/DL (ref 0.51–0.95)
DEPRECATED HCO3 PLAS-SCNC: 24 MMOL/L (ref 22–29)
ERYTHROCYTE [DISTWIDTH] IN BLOOD BY AUTOMATED COUNT: 12 % (ref 10–15)
FERRITIN SERPL-MCNC: 182 NG/ML (ref 11–328)
GFR SERPL CREATININE-BSD FRML MDRD: 81 ML/MIN/1.73M2
GLUCOSE SERPL-MCNC: 103 MG/DL (ref 70–99)
HCT VFR BLD AUTO: 41.6 % (ref 35–47)
HGB BLD-MCNC: 13.9 G/DL (ref 11.7–15.7)
MCH RBC QN AUTO: 30.3 PG (ref 26.5–33)
MCHC RBC AUTO-ENTMCNC: 33.4 G/DL (ref 31.5–36.5)
MCV RBC AUTO: 91 FL (ref 78–100)
PLATELET # BLD AUTO: 242 10E3/UL (ref 150–450)
POTASSIUM SERPL-SCNC: 4.1 MMOL/L (ref 3.4–5.3)
PROT SERPL-MCNC: 7 G/DL (ref 6.4–8.3)
RBC # BLD AUTO: 4.58 10E6/UL (ref 3.8–5.2)
SODIUM SERPL-SCNC: 139 MMOL/L (ref 136–145)
TRANSFERRIN SERPL-MCNC: 226 MG/DL (ref 200–360)
WBC # BLD AUTO: 6.3 10E3/UL (ref 4–11)

## 2022-09-13 PROCEDURE — 99000 SPECIMEN HANDLING OFFICE-LAB: CPT

## 2022-09-13 PROCEDURE — 82104 ALPHA-1-ANTITRYPSIN PHENO: CPT | Mod: 90

## 2022-09-13 PROCEDURE — 80053 COMPREHEN METABOLIC PANEL: CPT

## 2022-09-13 PROCEDURE — 82728 ASSAY OF FERRITIN: CPT

## 2022-09-13 PROCEDURE — 86803 HEPATITIS C AB TEST: CPT

## 2022-09-13 PROCEDURE — 85027 COMPLETE CBC AUTOMATED: CPT

## 2022-09-13 PROCEDURE — 82103 ALPHA-1-ANTITRYPSIN TOTAL: CPT | Mod: 90

## 2022-09-13 PROCEDURE — 36415 COLL VENOUS BLD VENIPUNCTURE: CPT

## 2022-09-13 PROCEDURE — 82390 ASSAY OF CERULOPLASMIN: CPT

## 2022-09-13 PROCEDURE — 86704 HEP B CORE ANTIBODY TOTAL: CPT

## 2022-09-13 PROCEDURE — 87340 HEPATITIS B SURFACE AG IA: CPT

## 2022-09-13 PROCEDURE — 84466 ASSAY OF TRANSFERRIN: CPT

## 2022-09-14 LAB
HBV CORE AB SERPL QL IA: NONREACTIVE
HBV SURFACE AG SERPL QL IA: NONREACTIVE
HCV AB SERPL QL IA: NONREACTIVE

## 2022-09-15 LAB — CERULOPLASMIN SERPL-MCNC: 19 MG/DL (ref 20–60)

## 2022-09-18 LAB
A1AT PHENOTYP SERPL-IMP: NORMAL
A1AT SERPL-MCNC: 113 MG/DL

## 2022-09-19 ENCOUNTER — OFFICE VISIT (OUTPATIENT)
Dept: DERMATOLOGY | Facility: CLINIC | Age: 57
End: 2022-09-19
Payer: COMMERCIAL

## 2022-09-19 DIAGNOSIS — L98.9: ICD-10-CM

## 2022-09-19 DIAGNOSIS — L82.1 SEBORRHEIC KERATOSIS: Primary | ICD-10-CM

## 2022-09-19 DIAGNOSIS — L81.8 IDIOPATHIC GUTTATE HYPOMELANOSIS: ICD-10-CM

## 2022-09-19 PROCEDURE — 99203 OFFICE O/P NEW LOW 30 MIN: CPT | Performed by: PHYSICIAN ASSISTANT

## 2022-09-19 ASSESSMENT — PAIN SCALES - GENERAL: PAINLEVEL: NO PAIN (0)

## 2022-09-19 NOTE — LETTER
9/19/2022         RE: Margot Kendrick  [address] [address] [id number]        Dear Colleague,    Thank you for referring your patient, Margot Kendrick, to the [facility]. Please see a copy of my visit note below.    HPI:   Chief complaints: Margot Kendrick is a pleasant 57 year old female who presents for evaluation of two spots of concern. She has a new scaly bump on the scalp which has been present for several months. She also has a newer bump on the left chest which she will pick off but then the bump returns. No history of skin cancer.     Shx: works in [employer] and lives in [place]; [place]      PHYSICAL EXAM:    There were no vitals taken for this visit.  Skin exam performed as follows: Type 2 skin. Mood appropriate  Alert and Oriented X 3. Well developed, well nourished in no distress.  General appearance: Normal  Head including face: Normal  Eyes: conjunctiva and lids: Normal  Mouth: Lips, teeth, gums: Normal  Neck: Normal  Cardiovascular: Exam of peripheral vascular system by observation for swelling, varicosities, edema: Normal  Right upper extremity: Normal  Left upper extremity: Normal  Right lower extremity: Normal  Left lower extremity: Normal  Skin: Scalp and body hair: See below    Keratotic papule on the right vertex scalp x 1, left chest x 1  Brown and tan macules on the face    ASSESSMENT/PLAN:     1. Seborrheic keratoses - advised benign no treatment needed  2. Benign appearing lentigos and nevi on the face - no treatment needed. Recommend a daily SPF such as Cetaphil Oil Control moisturizer with SPF 30  3. 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           Follow-up: yearly FSE/PRN sooner  CC:   Scribed By: Jenna Falk, MS, PA-C          Again, thank you for allowing me to participate in the care of your patient.        Sincerely,        Jenna Falk,  LIZBETH

## 2022-09-19 NOTE — PROGRESS NOTES
HPI:   Chief complaints: Margot Kendrick is a pleasant 57 year old female who presents for evaluation of two spots of concern. She has a new scaly bump on the scalp which has been present for several months. She also has a newer bump on the left chest which she will pick off but then the bump returns. No history of skin cancer.     Shx: works in [employer] and lives in [place]; has [place]      PHYSICAL EXAM:    There were no vitals taken for this visit.  Skin exam performed as follows: Type 2 skin. Mood appropriate  Alert and Oriented X 3. Well developed, well nourished in no distress.  General appearance: Normal  Head including face: Normal  Eyes: conjunctiva and lids: Normal  Mouth: Lips, teeth, gums: Normal  Neck: Normal  Cardiovascular: Exam of peripheral vascular system by observation for swelling, varicosities, edema: Normal  Right upper extremity: Normal  Left upper extremity: Normal  Right lower extremity: Normal  Left lower extremity: Normal  Skin: Scalp and body hair: See below    Keratotic papule on the right vertex scalp x 1, left chest x 1  Brown and tan macules on the face    ASSESSMENT/PLAN:     1. Seborrheic keratoses - advised benign no treatment needed  2. Benign appearing lentigos and nevi on the face - no treatment needed. Recommend a daily SPF such as Cetaphil Oil Control moisturizer with SPF 30  3. 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           Follow-up: yearly FSE/PRN sooner  CC:   Scribed By: Jenna Falk, MS, PA-C

## 2022-11-02 NOTE — TELEPHONE ENCOUNTER
DIAGNOSIS:  Fatty infiltration of liver    Appt Date: 12.13.2022    NOTES STATUS DETAILS   OFFICE NOTE from referring provider Internal 10.06.2022 Lakeisha Haley MD   OFFICE NOTES from other specialists     DISCHARGE SUMMARY from hospital     MEDICATION LIST Internal    LIVER BIOSPY (IF APPLICABLE)      PATHOLOGY REPORTS      IMAGING     ENDOSCOPY (IF AVAILABLE)     COLONOSCOPY (IF AVAILABLE)     ULTRASOUND LIVER Internal 07.06.2022 US ABDOMEN LIMITED    CT OF ABDOMEN     MRI OF LIVER     FIBROSCAN, US ELASTOGRAPHY, FIBROSIS SCAN, MR ELASTOGRAPHY     LABS     HEPATIC PANEL (LIVER PANEL)     BASIC METABOLIC PANEL Internal 03.25.2019   COMPLETE METABOLIC PANEL Internal 09.13.2022   COMPLETE BLOOD COUNT (CBC) Internal 09.13.2022   INTERNATIONAL NORMALIZED RATIO (INR)     HEPATITIS C ANTIBODY Internal 09.13.2022   HEPATITIS C VIRAL LOAD/PCR     HEPATITIS C GENOTYPE     HEPATITIS B SURFACE ANTIGEN Internal 09.13.2022   HEPATITIS B SURFACE ANTIBODY     HEPATITIS B DNA QUANT LEVEL     HEPATITIS B CORE ANTIBODY Internal 09.13.2022

## 2022-12-13 ENCOUNTER — PRE VISIT (OUTPATIENT)
Dept: GASTROENTEROLOGY | Facility: CLINIC | Age: 57
End: 2022-12-13

## 2022-12-14 ENCOUNTER — OFFICE VISIT (OUTPATIENT)
Dept: PODIATRY | Facility: CLINIC | Age: 57
End: 2022-12-14
Payer: COMMERCIAL

## 2022-12-14 VITALS
DIASTOLIC BLOOD PRESSURE: 83 MMHG | WEIGHT: 187 LBS | SYSTOLIC BLOOD PRESSURE: 138 MMHG | HEIGHT: 65 IN | HEART RATE: 94 BPM | BODY MASS INDEX: 31.16 KG/M2

## 2022-12-14 DIAGNOSIS — M72.2 PLANTAR FASCIITIS, RIGHT: ICD-10-CM

## 2022-12-14 DIAGNOSIS — M72.2 PLANTAR FASCIITIS, LEFT: Primary | ICD-10-CM

## 2022-12-14 PROCEDURE — 20550 NJX 1 TENDON SHEATH/LIGAMENT: CPT | Mod: RT | Performed by: PODIATRIST

## 2022-12-14 PROCEDURE — 99203 OFFICE O/P NEW LOW 30 MIN: CPT | Mod: 25 | Performed by: PODIATRIST

## 2022-12-14 RX ORDER — LIDOCAINE HYDROCHLORIDE 10 MG/ML
1 INJECTION, SOLUTION INFILTRATION; PERINEURAL ONCE
Status: COMPLETED | OUTPATIENT
Start: 2022-12-14 | End: 2022-12-14

## 2022-12-14 RX ORDER — TRIAMCINOLONE ACETONIDE 40 MG/ML
40 INJECTION, SUSPENSION INTRA-ARTICULAR; INTRAMUSCULAR ONCE
Status: COMPLETED | OUTPATIENT
Start: 2022-12-14 | End: 2022-12-14

## 2022-12-14 RX ADMIN — LIDOCAINE HYDROCHLORIDE 1 ML: 10 INJECTION, SOLUTION INFILTRATION; PERINEURAL at 16:06

## 2022-12-14 RX ADMIN — TRIAMCINOLONE ACETONIDE 40 MG: 40 INJECTION, SUSPENSION INTRA-ARTICULAR; INTRAMUSCULAR at 16:05

## 2022-12-14 ASSESSMENT — PAIN SCALES - GENERAL: PAINLEVEL: MILD PAIN (3)

## 2022-12-14 NOTE — PROGRESS NOTES
PATIENT HISTORY:  Margot Kendrick is a 57 year old female who presents to clinic as a self referral with a chief complaint of pain in the heels, right greater than left.  The patient is seen by themselves.  The patient relates the pain is primarily located around the bottom of both heels.  Reports insidious onset without acute precipitating event. that has been going on for several  week(s).  The patient has previously tried inserts with little relief.  Prior history of similar pain/issues ~ several years ago..   Any previous notes and studies that pertain to the patient's condition were reviewed.    Pertinent medical, surgical and family history was reviewed in the [facility] chart.    Past Medical History:   Past Medical History:   Diagnosis Date     Arthritis      Esophageal reflux     GERD     Migraine, unspecified, without mention of intractable migraine without mention of status migrainosus      Mitral valve disorders(424.0)     thought possible MVP, not documented     Myalgia and myositis, unspecified     Fibromyalgia       Medications:   Current Outpatient Medications:      ADVIL 200 MG OR TABS, 2 tablets by mouth as needed, Disp: , Rfl:      LORazepam (ATIVAN) 0.5 MG tablet, Take 1 tablet (0.5 mg) by mouth daily For panic or for flying, Disp: 30 tablet, Rfl: 0     meloxicam (MOBIC) 7.5 MG tablet, Take 1 tablet (7.5 mg) by mouth daily, Disp: 30 tablet, Rfl: 5     naproxen sodium 220 MG capsule, Take 220 mg by mouth 2 times daily (with meals), Disp: , Rfl:      SUMAtriptan (IMITREX) 50 MG tablet, Take 1 tablet (50 mg) by mouth at onset of headache for migraine May repeat dose in 2 hours.  Do not exceed 200 mg in 24 hours, Disp: 18 tablet, Rfl: 3     temazepam (RESTORIL) 15 MG capsule, Take 2 capsules (30 mg) by mouth nightly as needed for sleep, Disp: 60 capsule, Rfl: 5     tiZANidine (ZANAFLEX) 4 MG tablet, Take 1 tablet (4 mg) by mouth 3 times daily, Disp: 30 tablet, Rfl: 0     Allergies:    Allergies    Allergen Reactions     Asa [Aspirin] Nausea     TINGLING  FINGERS, HEAD,        Vitals: There were no vitals taken for this visit.  BMI= There is no height or weight on file to calculate BMI.    LOWER EXTREMITY PHYSICAL EXAM    Dermatologic: Skin is intact to right and left lower extremity without significant lesions, rash or abrasion.        Vascular: DP & PT pulses are intact & regular on the right and left.   CFT and skin temperature is normal to the right and left lower extremity.     Neurologic: Lower extremity sensation is intact to light touch.  No evidence of weakness in the right and left lower extremity.        Musculoskeletal: Patient is ambulatory without assistive device or brace.  No gross ankle deformity noted.  No foot or ankle joint effusion is noted.  Noted pain on palpation under the heels near the insertion point of the plantar fascia, right greater than left.  Noted tight gastroc complex bilaterally.         ASSESSMENT / PLAN:     ICD-10-CM    1. Plantar fasciitis, left  M72.2           I have explained to Margot about the conditions.  We discussed the underlying contributing factors to the condition as well as treatment options along with expected length of recovery.  At this time, the patient was educated on the importance of offloading supportive shoes and other devices.  I demonstrated to the patient calf stretches to perform every hour daily until symptoms resolve.  After symptoms resolve, the patient was advised to perform the stretches 3 times daily to prevent future recurrence.  The patient was instructed to perform warm soaks with Epson salt after which to also apply over-the-counter Voltaren gel to deeply massage the injured tissue.  The patient was instructed to do this on a daily basis until symptoms resolve.   To reduce the amount of current inflammation, the patient would like to try a steroid injection to the right heel.  The medial aspect of the right heel was swabbed with  alcohol.  Next, a mixture of 5mg of Kenalog 40, and 2 cc of 1% lidocaine plain was injected around the medial tubercle of the calcaneal in and around the insertion of the plantar fascia to the right foot.  The patient tolerated the procedure well.  A Band-Aid was applied to the injection site.  The patient will follow up in four weeks.        Margot verbalized agreement with and understanding of the rational for the diagnosis and treatment plan.  All questions were answered to best of my ability and the patient's satisfaction. The patient was advised to contact the clinic with any questions that may arise after the clinic visit.      Disclaimer: This note consists of symbols derived from keyboarding, dictation and/or voice recognition software. As a result, there may be errors in the script that have gone undetected. Please consider this when interpreting information found in this chart.       SUZANNE Morris D.P.M., F.JEET.F.A.S.

## 2022-12-14 NOTE — LETTER
12/14/2022         RE: Margot Kendrick  [address] [id number]        Dear Colleague,    Thank you for referring your patient, Margot Kendrick, to the [facility]. Please see a copy of my visit note below.    PATIENT HISTORY:  Margot Kendrick is a 57 year old female who presents to clinic as a self referral with a chief complaint of pain in the heels, right greater than left.  The patient is seen by themselves.  The patient relates the pain is primarily located around the bottom of both heels.  Reports insidious onset without acute precipitating event. that has been going on for several  week(s).  The patient has previously tried inserts with little relief.  Prior history of similar pain/issues ~ several years ago..   Any previous notes and studies that pertain to the patient's condition were reviewed.    Pertinent medical, surgical and family history was reviewed in the [facility] chart.    Past Medical History:   Past Medical History:   Diagnosis Date     Arthritis      Esophageal reflux     GERD     Migraine, unspecified, without mention of intractable migraine without mention of status migrainosus      Mitral valve disorders(424.0)     thought possible MVP, not documented     Myalgia and myositis, unspecified     Fibromyalgia       Medications:   Current Outpatient Medications:      ADVIL 200 MG OR TABS, 2 tablets by mouth as needed, Disp: , Rfl:      LORazepam (ATIVAN) 0.5 MG tablet, Take 1 tablet (0.5 mg) by mouth daily For panic or for flying, Disp: 30 tablet, Rfl: 0     meloxicam (MOBIC) 7.5 MG tablet, Take 1 tablet (7.5 mg) by mouth daily, Disp: 30 tablet, Rfl: 5     naproxen sodium 220 MG capsule, Take 220 mg by mouth 2 times daily (with meals), Disp: , Rfl:      SUMAtriptan (IMITREX) 50 MG tablet, Take 1 tablet (50 mg) by mouth at onset of headache for migraine May repeat dose in 2 hours.  Do not exceed 200 mg in 24 hours,  Disp: 18 tablet, Rfl: 3     temazepam (RESTORIL) 15 MG capsule, Take 2 capsules (30 mg) by mouth nightly as needed for sleep, Disp: 60 capsule, Rfl: 5     tiZANidine (ZANAFLEX) 4 MG tablet, Take 1 tablet (4 mg) by mouth 3 times daily, Disp: 30 tablet, Rfl: 0     Allergies:    Allergies   Allergen Reactions     Asa [Aspirin] Nausea     TINGLING  FINGERS, HEAD,        Vitals: There were no vitals taken for this visit.  BMI= There is no height or weight on file to calculate BMI.    LOWER EXTREMITY PHYSICAL EXAM    Dermatologic: Skin is intact to right and left lower extremity without significant lesions, rash or abrasion.        Vascular: DP & PT pulses are intact & regular on the right and left.   CFT and skin temperature is normal to the right and left lower extremity.     Neurologic: Lower extremity sensation is intact to light touch.  No evidence of weakness in the right and left lower extremity.        Musculoskeletal: Patient is ambulatory without assistive device or brace.  No gross ankle deformity noted.  No foot or ankle joint effusion is noted.  Noted pain on palpation under the heels near the insertion point of the plantar fascia, right greater than left.  Noted tight gastroc complex bilaterally.         ASSESSMENT / PLAN:     ICD-10-CM    1. Plantar fasciitis, left  M72.2           I have explained to Margot about the conditions.  We discussed the underlying contributing factors to the condition as well as treatment options along with expected length of recovery.  At this time, the patient was educated on the importance of offloading supportive shoes and other devices.  I demonstrated to the patient calf stretches to perform every hour daily until symptoms resolve.  After symptoms resolve, the patient was advised to perform the stretches 3 times daily to prevent future recurrence.  The patient was instructed to perform warm soaks with Epson salt after which to also apply over-the-counter Voltaren gel to deeply  massage the injured tissue.  The patient was instructed to do this on a daily basis until symptoms resolve.   To reduce the amount of current inflammation, the patient would like to try a steroid injection to the right heel.  The medial aspect of the right heel was swabbed with alcohol.  Next, a mixture of 5mg of Kenalog 40, and 2 cc of 1% lidocaine plain was injected around the medial tubercle of the calcaneal in and around the insertion of the plantar fascia to the right foot.  The patient tolerated the procedure well.  A Band-Aid was applied to the injection site.  The patient will follow up in four weeks.        Margot verbalized agreement with and understanding of the rational for the diagnosis and treatment plan.  All questions were answered to best of my ability and the patient's satisfaction. The patient was advised to contact the clinic with any questions that may arise after the clinic visit.      Disclaimer: This note consists of symbols derived from keyboarding, dictation and/or voice recognition software. As a result, there may be errors in the script that have gone undetected. Please consider this when interpreting information found in this chart.       PRISCILA Pena.P.NEIL., F.A.C.F.A.S.        Again, thank you for allowing me to participate in the care of your patient.        Sincerely,        Panchito Morris DPM

## 2022-12-14 NOTE — NURSING NOTE
"Chief Complaint   Patient presents with     RECHECK     Right foot plantarfasciitis       Initial /83   Pulse 94   Ht 1.656 m (5' 5.18\")   Wt 84.8 kg (187 lb)   BMI 30.95 kg/m   Estimated body mass index is 30.95 kg/m  as calculated from the following:    Height as of this encounter: 1.656 m (5' 5.18\").    Weight as of this encounter: 84.8 kg (187 lb).  Medications and allergies reviewed.      Emilie FRANCOIS MA    "

## 2022-12-14 NOTE — PATIENT INSTRUCTIONS
Next Steps:      Support:  Wear supportive shoes, sandals, boots and/or inserts that have a rigid supportive sole.    This will offload the majority of tension forces that travel through your feet every step you take.    Skechers Max Cushioning Elite/Premier   Skechers Relax Fit D'Lux Walker  Reebok Walk Ultra 7 DMX MAX   Hoka Bondi walking shoes  Wanderio shoes/slippers ([email or web address])  [employer] sandals ([email or web address]://www.[employer]/us)  Superfeet inserts (www.superfeet.com)  It is important that you also wear supportive shoe wear in the house to continue providing support to your feet.    You may always use a cushioned liner for your shoes if that makes your feet feel better.  Stretching  Calf stretching is essential to offload the tension forces that travel through your feet every step you take  Preferred calf stretch is the Runner's Stretch  Place one foot behind the other foot, flat against the ground (it is important to keep the heel on the ground).  The back leg is the one that will be stretched.  Start with the knee straight and lean your hips into the wall, counter or whatever you are leaning into - count to ten.  Next, bend the knee.  You should feel the stretch lower in the calf muscle - count to ten.  Repeat this stretch once an hour to start off with.  When symptoms subside, I recommend performing the stretch 3 times daily to prevent any future problems.                Tissue Massage  It is important that you physically loosen the inflammation tissue to help your body heal the injured tissue.  I recommend soaking your foot in warm water to increase the microcirculation to the soft tissues.  You may add Epson salt to the water if you prefer.  You may apply an over-the-counter muscle rub, such as Voltaren gel, and deeply massage the injured tissue.  Reduce Inflammation  You can ice the injured tissue with an ice pack with a light cloth covering or soaking in ice water 20 minutes to reduce  any acute inflammation, typically at the end of the day.      It is important to understand that most problems that develop in the foot and ankle are caused by excessive tension that cause microinjury to the soft tissues and inflammation in the foot and ankle.  By addressing the underlying causes with support and stretching as well as treating the current inflammatory conditions with tissue massage and anti-inflammatory treatments, most foot and ankle musculoskeletal conditions will resolve.  This may take time to heal.  However, if symptoms persist past 4 weeks you should return to the office for reevaluation to determine further treatment options.    After the Injection     After the injection, strenuous and repetitive activity should be minimized for approximately 48 hours.   Ice should be applied to the injected area at least for the next 48 hours.   Apply ice to the injected area at least 3 - 4 times a day for 20 minutes each time for the next 48 hours. This can reduce the painful  flare  reaction that can follow an injection the next day. This reaction can cause the area that was injected to hurt more the next day just from the injection. This will resolve within a day if it does occur.     Use over-the-counter pain medications such as Tylenol to help with the pain if necessary.     After 48 hours, icing the area may be continued if you find it beneficial.     The lidocaine or marcaine (commonly called Novocain) is an anesthetic agent that is injected with the steroid will typically relieve your pain for a few hours following the injection. If the  Novocain  and steroid are injected near a nerve, you may experience local numbness or weakness from the nerve block until it wears off. After this wears off your pain may return until the steroid takes effect.   The steroid may be effective immediately after the injection. Do not be concerned if the injection is not effective in relieving your symptoms immediately.  In some cases, it may take up to two weeks for the steroid to work.   If you are diabetic, the corticosteroid may cause your blood sugar to become elevated for several days following the injection. This response usually lasts about 2-4 days before it returns to your normal level.   You should report any adverse reaction to you doctor. Call if there are any questions.

## 2022-12-17 DIAGNOSIS — M25.50 MULTIPLE JOINT PAIN: ICD-10-CM

## 2022-12-17 DIAGNOSIS — F51.01 PRIMARY INSOMNIA: ICD-10-CM

## 2022-12-17 DIAGNOSIS — M79.7 FIBROMYALGIA: ICD-10-CM

## 2022-12-19 RX ORDER — TEMAZEPAM 15 MG/1
30 CAPSULE ORAL
Qty: 60 CAPSULE | Refills: 5 | Status: SHIPPED | OUTPATIENT
Start: 2022-12-19 | End: 2023-06-07

## 2022-12-19 RX ORDER — MELOXICAM 7.5 MG/1
7.5 TABLET ORAL DAILY
Qty: 30 TABLET | Refills: 5 | Status: SHIPPED | OUTPATIENT
Start: 2022-12-19 | End: 2023-06-07

## 2023-02-06 ENCOUNTER — OFFICE VISIT (OUTPATIENT)
Dept: FAMILY MEDICINE | Facility: CLINIC | Age: 58
End: 2023-02-06
Payer: COMMERCIAL

## 2023-02-06 VITALS
WEIGHT: 184 LBS | BODY MASS INDEX: 30.66 KG/M2 | OXYGEN SATURATION: 96 % | SYSTOLIC BLOOD PRESSURE: 122 MMHG | DIASTOLIC BLOOD PRESSURE: 70 MMHG | TEMPERATURE: 98.3 F | HEART RATE: 82 BPM | RESPIRATION RATE: 16 BRPM | HEIGHT: 65 IN

## 2023-02-06 DIAGNOSIS — R05.8 POST-VIRAL COUGH SYNDROME: Primary | ICD-10-CM

## 2023-02-06 PROCEDURE — 99213 OFFICE O/P EST LOW 20 MIN: CPT | Performed by: PHYSICIAN ASSISTANT

## 2023-02-06 RX ORDER — ALBUTEROL SULFATE 90 UG/1
2 AEROSOL, METERED RESPIRATORY (INHALATION) EVERY 6 HOURS PRN
Qty: 18 G | Refills: 0 | Status: SHIPPED | OUTPATIENT
Start: 2023-02-06 | End: 2023-06-07

## 2023-02-06 RX ORDER — PREDNISONE 20 MG/1
40 TABLET ORAL DAILY
Qty: 10 TABLET | Refills: 0 | Status: SHIPPED | OUTPATIENT
Start: 2023-02-06 | End: 2023-02-11

## 2023-02-06 ASSESSMENT — PAIN SCALES - GENERAL: PAINLEVEL: NO PAIN (0)

## 2023-02-06 NOTE — NURSING NOTE
"No chief complaint on file.      Initial There were no vitals taken for this visit. Estimated body mass index is 30.95 kg/m  as calculated from the following:    Height as of 12/14/22: 1.656 m (5' 5.18\").    Weight as of 12/14/22: 84.8 kg (187 lb).    Patient presents to the clinic using     Is there anyone who you would like to be able to receive your results?   If yes have patient fill out ALLEGRA      "

## 2023-02-06 NOTE — PROGRESS NOTES
"  Assessment & Plan     (R05.8) Post-viral cough syndrome  (primary encounter diagnosis)  Comment: overall most of symptoms at onset of covid diagnosis have improved but cough lingering and feeling a burning in her chest. Given h/o more 'reactive airways' post infections and no significant findings on exam to suggest infection, will treat with prednisone in hopes of reducing airway irritation  Plan: predniSONE (DELTASONE) 20 MG tablet, albuterol         (PROAIR HFA/PROVENTIL HFA/VENTOLIN HFA) 108 (90        Base) MCG/ACT inhaler                         BMI:   Estimated body mass index is 30.62 kg/m  as calculated from the following:    Height as of this encounter: 1.651 m (5' 5\").    Weight as of this encounter: 83.5 kg (184 lb).           Return in about 2 weeks (around 2/20/2023) for If not improving or worsening.    LIZBETH Mcdonald [facility] CRYSTAL Frost is a 57 year old, presenting for the following health issues:  No chief complaint on file.      History of Present Illness       Reason for visit:  Covid cough and burning chest  Symptom onset:  1-2 weeks ago  Symptoms include:  Covid cough and burning chest  Symptom intensity:  Moderate  Symptom progression:  Staying the same  Had these symptoms before:  No  What makes it worse:  Physical activity  What makes it better:  Laying down    She eats 2-3 servings of fruits and vegetables daily.She consumes 0 sweetened beverage(s) daily.She exercises with enough effort to increase her heart rate 10 to 19 minutes per day.  She exercises with enough effort to increase her heart rate 4 days per week.   She is taking medications regularly.      got COVID at same time and was down for 2.5 days  She has had it now going on 3 weeks  States this is typical for her when she gets sick - her symptoms (especially the cough) always seem to hang out  Has had pneumonia in the past and pleurisy    Does not feel SOB unless she is up moving " "around a lot  Not coughing at all at night which is unusual for her with previous infections. However as soon as she sits up she will start coughing. The more she moves around at home or work she will cough and with a lot of coughing she will start coughing  Non productive  No fevers      Review of Systems   Remainder of ROS obtained and found to be negative other than that which was documented above        Objective    /70   Pulse 82   Temp 98.3  F (36.8  C) (Tympanic)   Resp 16   Ht 1.651 m (5' 5\")   Wt 83.5 kg (184 lb)   SpO2 96%   BMI 30.62 kg/m    Body mass index is 30.62 kg/m .  Physical Exam   GENERAL: healthy, alert and no distress  EYES: Eyes grossly normal to inspection  RESP: lungs clear to auscultation - no rales, rhonchi or wheezes  CV: regular rates and rhythm, normal S1 S2, no S3 or S4, no murmur, click or rub and no peripheral edema    Diagnostic Tests:  none                "

## 2023-03-19 ENCOUNTER — MYC MEDICAL ADVICE (OUTPATIENT)
Dept: FAMILY MEDICINE | Facility: CLINIC | Age: 58
End: 2023-03-19
Payer: COMMERCIAL

## 2023-03-19 DIAGNOSIS — R14.0 BLOATING: Primary | ICD-10-CM

## 2023-03-20 NOTE — TELEPHONE ENCOUNTER
"Spoke with patient. Patient denied completing a stool sample and denied an office visit with Dr. Kenney. Patient states \"She will just wait to See Dr. Haley in June.\" and hung up the phone.  "

## 2023-06-05 ASSESSMENT — ENCOUNTER SYMPTOMS
MYALGIAS: 1
DIARRHEA: 1
FEVER: 0
ARTHRALGIAS: 1
DYSURIA: 0
HEMATURIA: 0
NAUSEA: 0
HEARTBURN: 1
BREAST MASS: 0
PALPITATIONS: 0
PARESTHESIAS: 0
COUGH: 0
DIZZINESS: 0
WEAKNESS: 0
FREQUENCY: 1
SHORTNESS OF BREATH: 0
EYE PAIN: 0
CONSTIPATION: 1
SORE THROAT: 0
ABDOMINAL PAIN: 0
HEMATOCHEZIA: 0
HEADACHES: 1
JOINT SWELLING: 0
CHILLS: 0
NERVOUS/ANXIOUS: 0

## 2023-06-07 ENCOUNTER — OFFICE VISIT (OUTPATIENT)
Dept: FAMILY MEDICINE | Facility: CLINIC | Age: 58
End: 2023-06-07
Payer: COMMERCIAL

## 2023-06-07 ENCOUNTER — PATIENT OUTREACH (OUTPATIENT)
Dept: CARE COORDINATION | Facility: CLINIC | Age: 58
End: 2023-06-07

## 2023-06-07 VITALS
HEART RATE: 112 BPM | TEMPERATURE: 98.3 F | RESPIRATION RATE: 12 BRPM | DIASTOLIC BLOOD PRESSURE: 74 MMHG | HEIGHT: 65 IN | SYSTOLIC BLOOD PRESSURE: 110 MMHG | BODY MASS INDEX: 29.99 KG/M2 | WEIGHT: 180 LBS | OXYGEN SATURATION: 97 %

## 2023-06-07 DIAGNOSIS — G43.119 INTRACTABLE MIGRAINE WITH AURA WITHOUT STATUS MIGRAINOSUS: ICD-10-CM

## 2023-06-07 DIAGNOSIS — M25.50 MULTIPLE JOINT PAIN: ICD-10-CM

## 2023-06-07 DIAGNOSIS — R49.0 HOARSENESS: ICD-10-CM

## 2023-06-07 DIAGNOSIS — M79.7 FIBROMYALGIA: ICD-10-CM

## 2023-06-07 DIAGNOSIS — F51.01 PRIMARY INSOMNIA: ICD-10-CM

## 2023-06-07 DIAGNOSIS — Z00.00 ROUTINE GENERAL MEDICAL EXAMINATION AT A HEALTH CARE FACILITY: Primary | ICD-10-CM

## 2023-06-07 DIAGNOSIS — F41.9 ANXIETY: ICD-10-CM

## 2023-06-07 LAB
ALBUMIN SERPL BCG-MCNC: 4.7 G/DL (ref 3.5–5.2)
ALP SERPL-CCNC: 47 U/L (ref 35–104)
ALT SERPL W P-5'-P-CCNC: 80 U/L (ref 10–35)
ANION GAP SERPL CALCULATED.3IONS-SCNC: 7 MMOL/L (ref 7–15)
AST SERPL W P-5'-P-CCNC: 50 U/L (ref 10–35)
BILIRUB SERPL-MCNC: 1.1 MG/DL
BUN SERPL-MCNC: 14.5 MG/DL (ref 6–20)
CALCIUM SERPL-MCNC: 9.5 MG/DL (ref 8.6–10)
CHLORIDE SERPL-SCNC: 105 MMOL/L (ref 98–107)
CHOLEST SERPL-MCNC: 219 MG/DL
CREAT SERPL-MCNC: 0.79 MG/DL (ref 0.51–0.95)
DEPRECATED HCO3 PLAS-SCNC: 29 MMOL/L (ref 22–29)
ERYTHROCYTE [DISTWIDTH] IN BLOOD BY AUTOMATED COUNT: 12.1 % (ref 10–15)
GFR SERPL CREATININE-BSD FRML MDRD: 88 ML/MIN/1.73M2
GLUCOSE SERPL-MCNC: 117 MG/DL (ref 70–99)
HCT VFR BLD AUTO: 43.2 % (ref 35–47)
HDLC SERPL-MCNC: 36 MG/DL
HGB BLD-MCNC: 14.4 G/DL (ref 11.7–15.7)
LDLC SERPL CALC-MCNC: 135 MG/DL
MCH RBC QN AUTO: 30.5 PG (ref 26.5–33)
MCHC RBC AUTO-ENTMCNC: 33.3 G/DL (ref 31.5–36.5)
MCV RBC AUTO: 92 FL (ref 78–100)
NONHDLC SERPL-MCNC: 183 MG/DL
PLATELET # BLD AUTO: 240 10E3/UL (ref 150–450)
POTASSIUM SERPL-SCNC: 4.2 MMOL/L (ref 3.4–5.3)
PROT SERPL-MCNC: 7.1 G/DL (ref 6.4–8.3)
RBC # BLD AUTO: 4.72 10E6/UL (ref 3.8–5.2)
SODIUM SERPL-SCNC: 141 MMOL/L (ref 136–145)
TRIGL SERPL-MCNC: 238 MG/DL
TSH SERPL DL<=0.005 MIU/L-ACNC: 1.24 UIU/ML (ref 0.3–4.2)
WBC # BLD AUTO: 5.5 10E3/UL (ref 4–11)

## 2023-06-07 PROCEDURE — 80061 LIPID PANEL: CPT | Performed by: FAMILY MEDICINE

## 2023-06-07 PROCEDURE — 85027 COMPLETE CBC AUTOMATED: CPT | Performed by: FAMILY MEDICINE

## 2023-06-07 PROCEDURE — 36415 COLL VENOUS BLD VENIPUNCTURE: CPT | Performed by: FAMILY MEDICINE

## 2023-06-07 PROCEDURE — 80053 COMPREHEN METABOLIC PANEL: CPT | Performed by: FAMILY MEDICINE

## 2023-06-07 PROCEDURE — 84443 ASSAY THYROID STIM HORMONE: CPT | Performed by: FAMILY MEDICINE

## 2023-06-07 PROCEDURE — 99214 OFFICE O/P EST MOD 30 MIN: CPT | Mod: 25 | Performed by: FAMILY MEDICINE

## 2023-06-07 PROCEDURE — 99396 PREV VISIT EST AGE 40-64: CPT | Performed by: FAMILY MEDICINE

## 2023-06-07 RX ORDER — OMEPRAZOLE 40 MG/1
40 CAPSULE, DELAYED RELEASE ORAL DAILY
Qty: 90 CAPSULE | Refills: 0 | Status: SHIPPED | OUTPATIENT
Start: 2023-06-07 | End: 2024-04-03

## 2023-06-07 RX ORDER — LORAZEPAM 0.5 MG/1
0.5 TABLET ORAL DAILY
Qty: 30 TABLET | Refills: 0 | Status: SHIPPED | OUTPATIENT
Start: 2023-06-07 | End: 2024-06-21

## 2023-06-07 RX ORDER — SUMATRIPTAN 50 MG/1
50 TABLET, FILM COATED ORAL
Qty: 18 TABLET | Refills: 3 | Status: SHIPPED | OUTPATIENT
Start: 2023-06-07

## 2023-06-07 RX ORDER — TEMAZEPAM 15 MG/1
30 CAPSULE ORAL
Qty: 60 CAPSULE | Refills: 5 | Status: SHIPPED | OUTPATIENT
Start: 2023-06-07 | End: 2024-04-03

## 2023-06-07 RX ORDER — MELOXICAM 7.5 MG/1
7.5 TABLET ORAL DAILY
Qty: 30 TABLET | Refills: 5 | Status: SHIPPED | OUTPATIENT
Start: 2023-06-07 | End: 2024-04-03

## 2023-06-07 ASSESSMENT — ENCOUNTER SYMPTOMS
PARESTHESIAS: 0
HEADACHES: 1
SHORTNESS OF BREATH: 0
NERVOUS/ANXIOUS: 0
ARTHRALGIAS: 1
PALPITATIONS: 0
DIZZINESS: 0
NAUSEA: 0
FEVER: 0
SORE THROAT: 0
HEARTBURN: 1
EYE PAIN: 0
FREQUENCY: 1
DYSURIA: 0
BREAST MASS: 0
ABDOMINAL PAIN: 0
COUGH: 0
JOINT SWELLING: 0
CHILLS: 0
HEMATOCHEZIA: 0
CONSTIPATION: 1
WEAKNESS: 0
DIARRHEA: 1
MYALGIAS: 1
HEMATURIA: 0

## 2023-06-07 ASSESSMENT — PAIN SCALES - GENERAL: PAINLEVEL: MILD PAIN (2)

## 2023-06-07 NOTE — PROGRESS NOTES
SUBJECTIVE:   CC: Margot is an 57 year old who presents for preventive health visit.     Healthy Habits:    Getting at least 3 servings of Calcium per day:  Yes    Bi-annual eye exam:  Yes    Dental care twice a year:  Yes    Sleep apnea or symptoms of sleep apnea:  Daytime drowsiness, Excessive snoring and Sleep apnea    Diet:  Other    Frequency of exercise:  4-5 days/week    Duration of exercise:  15-30 minutes    Taking medications regularly:  Yes    Medication side effects:  Not applicable    PHQ-2 Total Score:    Additional concerns today:  No    Anxiety Follow-Up    How are you doing with your anxiety since your last visit? No change    Are you having other symptoms that might be associated with anxiety? No    Have you had a significant life event? No     Are you feeling depressed? No    Do you have any concerns with your use of alcohol or other drugs? No    Social History     Tobacco Use     Smoking status: Former     Packs/day: 0.00     Years: 0.00     Pack years: 0.00     Types: Cigarettes     Quit date: 2013     Years since quittin.9     Smokeless tobacco: Never     Tobacco comments:     quit plan offered   Substance Use Topics     Alcohol use: No     Drug use: No          View : No data to display.                   View : No data to display.                  Migraine     Since your last clinic visit, how have your headaches changed?  Worsened    How often are you getting headaches or migraines? Several times per week      Are you able to do normal daily activities when you have a migraine? Yes    Are you taking rescue/relief medications? (Select all that apply) sumatriptan (Imitrex)    How helpful is your rescue/relief medication?  I get total relief    Are you taking any medications to prevent migraines? (Select all that apply)  No    In the past 4 weeks, how often have you gone to urgent care or the emergency room because of your headaches?  0     Concerned that her sinuses are triggering  migraine      Fibromyalgia and OA stable the vlatren does help some topically and she is just using it in the am     Hoarseness  Loses her voice by the end of the day   She feels like her voice is very weak   Going on for the last several months    Has some mild GERD   Had egd 8 years ago with biopsies     Also chronic sinus congestion and postnasal discharge she has had sinus surgery in the past     Social History     Tobacco Use     Smoking status: Former     Packs/day: 0.00     Years: 0.00     Pack years: 0.00     Types: Cigarettes     Quit date: 2013     Years since quittin.9     Smokeless tobacco: Never     Tobacco comments:     quit plan offered   Vaping Use     Vaping status: Not on file   Substance Use Topics     Alcohol use: No             2023     8:13 AM   Alcohol Use   Prescreen: >3 drinks/day or >7 drinks/week? Not Applicable     Reviewed orders with patient.  Reviewed health maintenance and updated orders accordingly - Yes  Labs reviewed in EPIC    Breast Cancer Screenin/9/2022    12:15 PM 2022     2:01 PM   Breast CA Risk Assessment (FHS-7)   Do you have a family history of breast, colon, or ovarian cancer? No / Unknown No / Unknown         Mammogram Screening: Recommended mammography every 1-2 years with patient discussion and risk factor consideration  Pertinent mammograms are reviewed under the imaging tab.    History of abnormal Pap smear: Status post benign hysterectomy. Health Maintenance and Surgical History updated.     Reviewed and updated as needed this visit by clinical staff   Tobacco  Allergies  Meds  Problems  Med Hx  Surg Hx  Fam Hx          Reviewed and updated as needed this visit by Provider   Tobacco  Allergies  Meds  Problems  Med Hx  Surg Hx  Fam Hx             Review of Systems   Constitutional: Negative for chills and fever.   HENT: Positive for hearing loss. Negative for congestion, ear pain and sore throat.    Eyes: Negative for pain  "and visual disturbance.   Respiratory: Negative for cough and shortness of breath.    Cardiovascular: Positive for peripheral edema. Negative for chest pain and palpitations.   Gastrointestinal: Positive for constipation, diarrhea and heartburn. Negative for abdominal pain, hematochezia and nausea.   Breasts:  Negative for tenderness, breast mass and discharge.   Genitourinary: Positive for frequency and urgency. Negative for dysuria, genital sores, hematuria, pelvic pain, vaginal bleeding and vaginal discharge.   Musculoskeletal: Positive for arthralgias and myalgias. Negative for joint swelling.   Skin: Negative for rash.   Neurological: Positive for headaches. Negative for dizziness, weakness and paresthesias.   Psychiatric/Behavioral: Positive for mood changes. The patient is not nervous/anxious.      See hpi     OBJECTIVE:   /74   Pulse 112   Temp 98.3  F (36.8  C) (Tympanic)   Resp 12   Ht 1.651 m (5' 5\")   Wt 81.6 kg (180 lb)   SpO2 97%   BMI 29.95 kg/m    Physical Exam  GENERAL APPEARANCE: healthy, alert and no distress  EYES: Eyes grossly normal to inspection, PERRL and conjunctivae and sclerae normal  HENT: ear canals and TM's normal, nose and mouth without ulcers or lesions, oropharynx clear and oral mucous membranes moist  NECK: no adenopathy, no asymmetry, masses, or scars and thyroid normal to palpation  RESP: lungs clear to auscultation - no rales, rhonchi or wheezes  CV: regular rate and rhythm, normal S1 S2, no S3 or S4, no murmur, click or rub, no peripheral edema and peripheral pulses strong  ABDOMEN: soft, nontender, no hepatosplenomegaly, no masses and bowel sounds normal  MS: no musculoskeletal defects are noted and gait is age appropriate without ataxia  SKIN: no suspicious lesions or rashes  NEURO: Normal strength and tone, sensory exam grossly normal, mentation intact and speech normal  PSYCH: mentation appears normal and affect normal/bright    Diagnostic Test Results:  Labs " "reviewed in Epic    ASSESSMENT/PLAN:   (Z00.00) Routine general medical examination at a health care facility  (primary encounter diagnosis)  Comment:   Plan: REVIEW OF HEALTH MAINTENANCE PROTOCOL ORDERS,         Comprehensive metabolic panel, CBC with         platelets, Lipid panel reflex to direct LDL         Fasting, TSH with free T4 reflex            (F41.9) Anxiety  Comment: Stable no change in treatment plan.   Plan: LORazepam (ATIVAN) 0.5 MG tablet            (M79.7) Fibromyalgia  Comment: fair control  Plan: meloxicam (MOBIC) 7.5 MG tablet            (M25.50) Multiple joint pain  Comment: hand pain is worse   She will try to use the voltaren gel qid for a week and see if this is helpful   Plan: meloxicam (MOBIC) 7.5 MG tablet            (G43.119) Intractable migraine with aura without status migrainosus  Comment: worsening frequency   imitrex works but using it too often  ?trigger by sinus vs stress   Plan: SUMAtriptan (IMITREX) 50 MG tablet            (F51.01) Primary insomnia  Comment:   Plan: temazepam (RESTORIL) 15 MG capsule            (R49.0) Hoarseness  Comment: GERD vs sinus discharge vs other   Start with ppi   If helpful would do egd   ENT consult for sinus issues and this if ppi not helpful   Plan: omeprazole (PRILOSEC) 40 MG DR capsule, Adult         ENT  Referral              Patient has been advised of split billing requirements and indicates understanding: Yes      COUNSELING:  Reviewed preventive health counseling, as reflected in patient instructions      BMI:   Estimated body mass index is 29.95 kg/m  as calculated from the following:    Height as of this encounter: 1.651 m (5' 5\").    Weight as of this encounter: 81.6 kg (180 lb).   Weight management plan: Discussed healthy diet and exercise guidelines      She reports that she quit smoking about 9 years ago. Her smoking use included cigarettes. She has never used smokeless tobacco.      Lakeisha Haley MD  [facility] " [facility]

## 2023-06-07 NOTE — NURSING NOTE
"Chief Complaint   Patient presents with     Physical     /74   Pulse 112   Temp 98.3  F (36.8  C) (Tympanic)   Resp 12   Ht 1.651 m (5' 5\")   Wt 81.6 kg (180 lb)   SpO2 97%   BMI 29.95 kg/m   Estimated body mass index is 29.95 kg/m  as calculated from the following:    Height as of this encounter: 1.651 m (5' 5\").    Weight as of this encounter: 81.6 kg (180 lb).  Patient presents to the clinic using No DME      Health Maintenance that is potentially due pending provider review:    Health Maintenance Due   Topic Date Due     MIGRAINE ACTION PLAN  Never done     HEPATITIS B IMMUNIZATION (1 of 3 - 3-dose series) Never done     LUNG CANCER SCREENING  09/14/2017     COVID-19 Vaccine (4 - Moderna series) 12/19/2021     YEARLY PREVENTIVE VISIT  06/10/2023     ANNUAL REVIEW OF HM ORDERS  06/10/2023        n/a        "

## 2023-06-20 ENCOUNTER — MYC MEDICAL ADVICE (OUTPATIENT)
Dept: FAMILY MEDICINE | Facility: CLINIC | Age: 58
End: 2023-06-20
Payer: COMMERCIAL

## 2023-06-20 DIAGNOSIS — G43.109 MIGRAINE WITH AURA AND WITHOUT STATUS MIGRAINOSUS, NOT INTRACTABLE: Primary | ICD-10-CM

## 2023-06-20 RX ORDER — SUMATRIPTAN 6 MG/.5ML
6 INJECTION, SOLUTION SUBCUTANEOUS ONCE
Qty: 0.5 ML | Refills: 0 | Status: SHIPPED | OUTPATIENT
Start: 2023-06-20 | End: 2023-06-20

## 2023-06-20 NOTE — TELEPHONE ENCOUNTER
Pls see [employer] messages from 6/20/23.    Rx pended and message routed to provider for consideration.     Julie Behrendt RN

## 2023-06-23 NOTE — TELEPHONE ENCOUNTER
Pls see [employer] message from 6/23/23.    Patient received the imitrex solution and separate syringe, patient requesting the autoinjection pen.    Rx pended and message routed to provider for consideration.     Julie Behrendt RN

## 2023-06-26 RX ORDER — CEFUROXIME AXETIL 250 MG/1
6 TABLET ORAL
Qty: 2 ML | Refills: 3 | Status: SHIPPED | OUTPATIENT
Start: 2023-06-26 | End: 2023-08-21

## 2023-08-14 DIAGNOSIS — K76.0 FATTY LIVER: Primary | ICD-10-CM

## 2023-08-16 ENCOUNTER — LAB (OUTPATIENT)
Dept: LAB | Facility: CLINIC | Age: 58
End: 2023-08-16
Payer: COMMERCIAL

## 2023-08-16 DIAGNOSIS — E83.00 LOW CERULOPLASMIN LEVEL (H): ICD-10-CM

## 2023-08-16 DIAGNOSIS — K76.0 FATTY LIVER: ICD-10-CM

## 2023-08-16 DIAGNOSIS — R79.89 ELEVATED LFTS: ICD-10-CM

## 2023-08-16 LAB
ALBUMIN SERPL BCG-MCNC: 4.7 G/DL (ref 3.5–5.2)
ALP SERPL-CCNC: 52 U/L (ref 35–104)
ALT SERPL W P-5'-P-CCNC: 119 U/L (ref 0–50)
ANION GAP SERPL CALCULATED.3IONS-SCNC: 11 MMOL/L (ref 7–15)
AST SERPL W P-5'-P-CCNC: 68 U/L (ref 0–45)
BILIRUB DIRECT SERPL-MCNC: <0.2 MG/DL (ref 0–0.3)
BILIRUB SERPL-MCNC: 0.7 MG/DL
BUN SERPL-MCNC: 20.3 MG/DL (ref 6–20)
CALCIUM SERPL-MCNC: 10.7 MG/DL (ref 8.6–10)
CHLORIDE SERPL-SCNC: 104 MMOL/L (ref 98–107)
CREAT SERPL-MCNC: 0.75 MG/DL (ref 0.51–0.95)
DEPRECATED HCO3 PLAS-SCNC: 25 MMOL/L (ref 22–29)
ERYTHROCYTE [DISTWIDTH] IN BLOOD BY AUTOMATED COUNT: 12.3 % (ref 10–15)
GFR SERPL CREATININE-BSD FRML MDRD: >90 ML/MIN/1.73M2
GLUCOSE SERPL-MCNC: 113 MG/DL (ref 70–99)
HCT VFR BLD AUTO: 44.4 % (ref 35–47)
HGB BLD-MCNC: 14.5 G/DL (ref 11.7–15.7)
INR PPP: 0.93 (ref 0.85–1.15)
MCH RBC QN AUTO: 30.5 PG (ref 26.5–33)
MCHC RBC AUTO-ENTMCNC: 32.7 G/DL (ref 31.5–36.5)
MCV RBC AUTO: 93 FL (ref 78–100)
PLATELET # BLD AUTO: 241 10E3/UL (ref 150–450)
POTASSIUM SERPL-SCNC: 4.5 MMOL/L (ref 3.4–5.3)
PROT SERPL-MCNC: 7.6 G/DL (ref 6.4–8.3)
RBC # BLD AUTO: 4.76 10E6/UL (ref 3.8–5.2)
SODIUM SERPL-SCNC: 140 MMOL/L (ref 136–145)
WBC # BLD AUTO: 6.5 10E3/UL (ref 4–11)

## 2023-08-16 PROCEDURE — 85610 PROTHROMBIN TIME: CPT

## 2023-08-16 PROCEDURE — 86706 HEP B SURFACE ANTIBODY: CPT

## 2023-08-16 PROCEDURE — 83540 ASSAY OF IRON: CPT

## 2023-08-16 PROCEDURE — 80053 COMPREHEN METABOLIC PANEL: CPT

## 2023-08-16 PROCEDURE — 82728 ASSAY OF FERRITIN: CPT

## 2023-08-16 PROCEDURE — 82248 BILIRUBIN DIRECT: CPT

## 2023-08-16 PROCEDURE — 36415 COLL VENOUS BLD VENIPUNCTURE: CPT

## 2023-08-16 PROCEDURE — 85027 COMPLETE CBC AUTOMATED: CPT

## 2023-08-16 PROCEDURE — 83550 IRON BINDING TEST: CPT

## 2023-08-17 LAB
HBV SURFACE AB SERPL IA-ACNC: 0.43 M[IU]/ML
HBV SURFACE AB SERPL IA-ACNC: NONREACTIVE M[IU]/ML

## 2023-08-21 ENCOUNTER — TRANSFERRED RECORDS (OUTPATIENT)
Dept: GASTROENTEROLOGY | Facility: CLINIC | Age: 58
End: 2023-08-21

## 2023-08-21 ENCOUNTER — OFFICE VISIT (OUTPATIENT)
Dept: GASTROENTEROLOGY | Facility: CLINIC | Age: 58
End: 2023-08-21
Attending: FAMILY MEDICINE
Payer: COMMERCIAL

## 2023-08-21 VITALS
OXYGEN SATURATION: 97 % | RESPIRATION RATE: 20 BRPM | DIASTOLIC BLOOD PRESSURE: 78 MMHG | BODY MASS INDEX: 30.81 KG/M2 | WEIGHT: 184.9 LBS | HEART RATE: 113 BPM | SYSTOLIC BLOOD PRESSURE: 140 MMHG | TEMPERATURE: 98.2 F | HEIGHT: 65 IN

## 2023-08-21 DIAGNOSIS — K76.0 FATTY LIVER: ICD-10-CM

## 2023-08-21 PROCEDURE — 91200 LIVER ELASTOGRAPHY: CPT | Mod: 26 | Performed by: PHYSICIAN ASSISTANT

## 2023-08-21 PROCEDURE — 99214 OFFICE O/P EST MOD 30 MIN: CPT | Mod: 25 | Performed by: PHYSICIAN ASSISTANT

## 2023-08-21 PROCEDURE — G0463 HOSPITAL OUTPT CLINIC VISIT: HCPCS | Performed by: PHYSICIAN ASSISTANT

## 2023-08-21 ASSESSMENT — PAIN SCALES - GENERAL: PAINLEVEL: NO PAIN (0)

## 2023-08-21 NOTE — NURSING NOTE
"Chief Complaint   Patient presents with    Consult     Fatty liver     Vital signs:  Temp: 98.2  F (36.8  C) Temp src: Oral BP: (!) 140/78 Pulse: 113   Resp: 20 SpO2: 97 %     Height: 165.1 cm (5' 5\") Weight: 83.9 kg (184 lb 14.4 oz)  Estimated body mass index is 30.77 kg/m  as calculated from the following:    Height as of this encounter: 1.651 m (5' 5\").    Weight as of this encounter: 83.9 kg (184 lb 14.4 oz).      Natalya Yee, [facility]  8/21/2023 12:22 PM    "

## 2023-08-21 NOTE — PROGRESS NOTES
Hepatology Clinic note  Margot Kendrick   Date of Birth 1965  Date of Service 8/20/2023    REASON FOR CONSULTATION: hepatic steatosis   REFERRING PROVIDER: Lakeisha Haley MD          Assessment/plan:   Margot Kendrick is a 57 year old female with history of elevated ALT/AST and imaging findings of hepatic steatosis. Risk factors for fatty liver disease includes: obesity, HTN, hyperlipidemia, hypothryoidism, diabetes, YOBANI. Transaminases have been historically been intermittently elevated, persistently elevated in the past few yers. Liver function also normal without stigmata of advanced liver disease.     #Metabolic associated fatty liver disease (MALFD):   - Patient has multiple risk factors making them high risk for on-going fibrosis. We discussed the pathophysiology and natural history of nonalcoholic fatty liver disease and cirrhosis.   - FibroScan today showing Stage 0-1 Fibrosis, 6.8 kilopascals and Grade 3 Steatosis.   - Recommend FIB-4 yearly with PCP. FIB-4 Calculation: 1.42 at 8/16/2023  2:25 PM  - Recommend slow gradual weight loss.   - Maintain good control of cholesterol (No contraindication to starting a statin with LFT elevations)   - Optimize blood glucose as needed. Could consider GLP-1 inhibitor for both insulin resistance and help with weight loss  - Improve nutrition: continue limiting carbohydrates, especially simple carbohydrates, and following Mediterranean eating patten  - Increase physical activity as able, ideally 150 minutes weekly  - Limit alcohol intake to not more than 3 drinks a week (currently only having three drinks a year)     # Will also rule out genetic and autoimmune causes of hepatobiliary disease.   Labs: F-actin, TTG/IgA, iron panel, ceruloplasmin, HFE mutation     - Consider/recommend follow up with non-invasive elastography or FibroScan in 3-5 years     - Follow-up in clinic as needed    Lawanda Loo PA-C   [facility] Hepatology      Total time for E/M services performed on the date of the encounter 45 minutes; excluding performing and official interpretation of fibrosis scan reads.  This included review of previous: clinic visits, hospital records, lab results, imaging studies, and procedural documentation. Time also includes patient visit, documentation and discussion with other providers.  The findings from this review are summarized in the above note.   -----------------------------------------------------       HPI:   Margot Kendrick is a 57 year old female  presenting for the evaluation of elevated LFT's.     Appetite is good. Drink very little dairy. Tries to protein shakes/hard boiled egg. Has breads. Can't have a big meal, has to eat small frequent meals.     Bowel movements are regular, twice daily. 2014, 188 lbs around menopause and then lost 40 lbs and then down 145 lbs. Mediterranean diet 192 and down to 170 lbs. Slowly up since then.     Swelling in right calf and ankles, no pitting edema. Join pains all over/ankles. Nerve pains and skin sensitivity for 10+ years. Given diagnosis of fibromyalgia. Evaluated by rheumatology in 2013, with two follow-ups.  Eye color change, now claudia.      Patient denies jaundice, lower extremity edema, abdominal distension or confusion.  Patient also denies melena, hematochezia or hematemesis. Patient denies weight loss, fevers, sweats or chills. Hot flashes.     PMH:    has a past medical history of Arthritis, Esophageal reflux, Migraine, unspecified, without mention of intractable migraine without mention of status migrainosus, Mitral valve disorders(424.0), and Myalgia and myositis, unspecified.     SMH:    has a past surgical history that includes surgical history of -  (12/92,06/87,12/93); surgical history of -  (03/01/1993); surgical history of -  (12/01/1993); surgical history of -  (02/01/1998); surgical history of -  (01/01/1999); surgical history of -  (03/2004/ 03/15/2005);  surgical history of -  (07/31/2001); surgical history of -  (08/01/1998); surgical history of -  (03/01/2005); surgical history of -  (12/01/2005); surgical history of -  (04/11/2008); surgical history of -  (05/09/2008); surgical history of -  (12/01/2009); ENT surgery; colonoscopy; orthopedic surgery; Colonoscopy (06/11/2012); hysterectomy, vaginal (01/01/2002); Abdomen surgery (01/01/2005); appendectomy (01/01/2005); ENT surgery (01/01/2000); orthopedic surgery (2006/2009/2010); Esophagoscopy, gastroscopy, duodenoscopy (EGD), combined (N/A, 10/08/2015); Laparoscopic cholecystectomy (N/A, 10/27/2015); Eye surgery (01/01/2015); and Colonoscopy (N/A, 02/15/2016).     Medications:   Advil Tabs  LORazepam  meloxicam  naproxen sodium  omeprazole  SUMAtriptan  temazepam     Quit tobacco in 2013.  Bloody Elizabeth 3 times a day. No previous IV/IN drug use.    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     Brother with stage 3 liver fibrosis / fibromyalgia. Unclear etiology.     Niece with Elher's Danlos.  Sister with degenerative disc.     Previous work-up:   Lab Results   Component Value Date    HEPBANG Nonreactive 09/13/2022    HBCAB Nonreactive 09/13/2022    AUSAB 0.43 08/16/2023    HCVAB Nonreactive 09/13/2022     09/13/2022    CER 19 (L) 09/13/2022    SANDY Negative 06/10/2022    A1A 113 09/13/2022    TSH 1.24 06/07/2023    CHOL 219 (H) 06/07/2023    HDL 36 (L) 06/07/2023     (H) 06/07/2023    TRIG 238 (H) 06/07/2023        Recent Labs   Lab Test 08/16/23  1425 06/07/23  0740 09/13/22  1503 06/28/22  0656 06/10/22  0820 05/12/21  0733 10/27/20  0000 02/28/20  1634 04/06/18  0928 09/30/15  1410   ALKPHOS 52 47 51 43 46 42 46 44 42 50   * 80* 58* 89* 100* 44 48 41 25 57*   AST 68* 50* 38* 51* 54* 26 25 23 18 35           Allergies:     Allergies   Allergen Reactions    Asa [Aspirin] Nausea     TINGLING  FINGERS, HEAD,             Social History:     Social History      Socioeconomic History    Marital status:      Spouse name: Not on file    Number of children: Not on file    Years of education: Not on file    Highest education level: Not on file   Occupational History     Employer: [employer]   Tobacco Use    Smoking status: Former     Packs/day: 0.00     Years: 0.00     Pack years: 0.00     Types: Cigarettes     Quit date: 6/30/2013     Years since quitting: 10.1    Smokeless tobacco: Never    Tobacco comments:     quit plan offered   Substance and Sexual Activity    Alcohol use: No    Drug use: No    Sexual activity: Not Currently     Partners: Male     Birth control/protection: Female Surgical     Comment: hyst 1998   Other Topics Concern    Parent/sibling w/ CABG, MI or angioplasty before 65F 55M? No   Social History Narrative    Not on file     Social Determinants of Health     Financial Resource Strain: Not on file   Food Insecurity: Not on file   Transportation Needs: Not on file   Physical Activity: Not on file   Stress: Not on file   Social Connections: Not on file   Intimate Partner Violence: Not on file   Housing Stability: Not on file            Family History:     Family History   Problem Relation Age of Onset    C.A.D. Maternal Grandmother     Arthritis Maternal Grandmother         rheumatoid arthritis    Diabetes Maternal Grandmother     Cardiovascular Maternal Grandfather     Coronary Artery Disease Maternal Grandfather     Cardiovascular Paternal Grandmother     Cardiovascular Paternal Grandfather     Respiratory Daughter         ASTHMA    Neurologic Disorder Father         FIBROMYALGIA    Heart Disease Father 68    C.A.D. Father 68        MI    Osteoporosis Father     Neurologic Disorder Brother         FIBROMYALGIA    Neurologic Disorder Sister         FIBROMYALGIA    Anxiety Disorder Sister     Thyroid Disease Mother     Hypertension Mother     Hyperlipidemia Mother     Arthritis Maternal Aunt         psoriatic    Diabetes Other          "Maternal Aunt    Coronary Artery Disease Other         Maternal Aunt    Hypertension Other         Maternal Aunt    Hyperlipidemia Other         Maternal Aunt    Melanoma No family hx of             Review of Systems:   Gen: See HPI     HEENT: No change in vision or hearing, mouth sores, dysphagia, lymph nodes  Resp: No shortness of breath, coughing, hx of asthma  CV: No chest pain, palpitations, syncope   GI: See HPI  : No dysuria, history of stones, urine color    Skin: No rash; no pruritus or psoriasis  MS: No arthralgias, myalgias, joint swelling  Neuro: No memory changes, confusion, numbness    Heme: No difficulty clotting, bruising, bleeding  Psych:  No anxiety, depression, agitation          Physical Exam:   BP (!) 140/78 (BP Location: Right arm, Patient Position: Sitting, Cuff Size: Adult Regular)   Pulse 113   Temp 98.2  F (36.8  C) (Oral)   Resp 20   Ht 1.651 m (5' 5\")   Wt 83.9 kg (184 lb 14.4 oz)   SpO2 97%   BMI 30.77 kg/m      Gen: A&Ox3, NAD, well developed  HEENT: non-icteric   CV: RRR, no overt murmurs  Lung: CTA Bilatererally, no wheezing or crackles.   Lym- no palpable lymphadenopathy  Abd: soft, NT, ND, no palpable splenomegaly, liver is not palpable.   Ext: no edema, intact pulses.   Skin: No rash, no palmar erythema, telangiectasias or jaundice  Neuro: grossly intact, no asterixis   Psych: appropriate mood and affects         Data:   Reviewed in person and significant for:    Lab Results   Component Value Date     08/16/2023     05/12/2021      Lab Results   Component Value Date    POTASSIUM 4.5 08/16/2023    POTASSIUM 4.0 06/28/2022    POTASSIUM 4.2 05/12/2021     Lab Results   Component Value Date    CHLORIDE 104 08/16/2023    CHLORIDE 108 06/28/2022    CHLORIDE 106 05/12/2021     Lab Results   Component Value Date    CO2 25 08/16/2023    CO2 26 06/28/2022    CO2 26 05/12/2021     Lab Results   Component Value Date    BUN 20.3 08/16/2023    BUN 16 06/28/2022    BUN 13 " 05/12/2021     Lab Results   Component Value Date    CR 0.75 08/16/2023    CR 0.76 05/12/2021       Lab Results   Component Value Date    WBC 6.5 08/16/2023    WBC 9.1 10/27/2020     Lab Results   Component Value Date    HGB 14.5 08/16/2023    HGB 14.4 10/27/2020     Lab Results   Component Value Date    HCT 44.4 08/16/2023    HCT 43.1 10/27/2020     Lab Results   Component Value Date    MCV 93 08/16/2023    MCV 93 10/27/2020     Lab Results   Component Value Date     08/16/2023     10/27/2020       Lab Results   Component Value Date    AST 68 08/16/2023    AST 26 05/12/2021     Lab Results   Component Value Date     08/16/2023    ALT 44 05/12/2021     Lab Results   Component Value Date    BILICONJ 0.0 01/13/2009      Lab Results   Component Value Date    BILITOTAL 0.7 08/16/2023    BILITOTAL 1.0 05/12/2021       Lab Results   Component Value Date    ALBUMIN 4.7 08/16/2023    ALBUMIN 4.0 06/28/2022    ALBUMIN 3.9 05/12/2021     Lab Results   Component Value Date    PROTTOTAL 7.6 08/16/2023    PROTTOTAL 7.4 05/12/2021      Lab Results   Component Value Date    ALKPHOS 52 08/16/2023    ALKPHOS 42 05/12/2021       Lab Results   Component Value Date    INR 0.93 08/16/2023         Imaging:        US ABDOMEN LIMITED 7/6/2022 6:47 AM     CLINICAL HISTORY: Elevated liver enzymes  TECHNIQUE: Limited abdominal ultrasound.     COMPARISON: 12/12/2016     FINDINGS:     GALLBLADDER: Absent.     BILE DUCTS: There is no biliary dilatation. The common duct measures  4mm.     LIVER: There is increased echogenicity of the liver parenchyma,  consistent with fatty infiltration. No focal hepatic mass.     RIGHT KIDNEY: No hydronephrosis.     PANCREAS: The visualized portions of the pancreas are normal.     No ascites.                                                                      IMPRESSION:  1.  Hepatic steatosis.  2.  Prior cholecystectomy.

## 2023-08-21 NOTE — LETTER
8/21/2023         RE: Margot Kendrick  [address] [facility] [id number]        Dear Colleague,    Thank you for referring your patient, Margot Kendrick, to the [facility] [place]. Please see a copy of my visit note below.    Hepatology Clinic note  Margot Kendrick   Date of Birth 1965  Date of Service 8/20/2023    REASON FOR CONSULTATION: hepatic steatosis   REFERRING PROVIDER: Lakeisha Haley MD          Assessment/plan:   Margot Kendrick is a 57 year old female with history of elevated ALT/AST and imaging findings of hepatic steatosis. Risk factors for fatty liver disease includes: obesity, HTN, hyperlipidemia, hypothryoidism, diabetes, YOBANI. Transaminases have been historically been intermittently elevated, persistently elevated in the past few yers. Liver function also normal without stigmata of advanced liver disease.     #Metabolic associated fatty liver disease (MALFD):   - Patient has multiple risk factors making them high risk for on-going fibrosis. We discussed the pathophysiology and natural history of nonalcoholic fatty liver disease and cirrhosis.   - FibroScan today showing Stage 0-1 Fibrosis, 6.8 kilopascals and Grade 3 Steatosis.   - Recommend FIB-4 yearly with PCP. FIB-4 Calculation: 1.42 at 8/16/2023  2:25 PM  - Recommend slow gradual weight loss.   - Maintain good control of cholesterol (No contraindication to starting a statin with LFT elevations)   - Optimize blood glucose as needed. Could consider GLP-1 inhibitor for both insulin resistance and help with weight loss  - Improve nutrition: continue limiting carbohydrates, especially simple carbohydrates, and following Mediterranean eating patten  - Increase physical activity as able, ideally 150 minutes weekly  - Limit alcohol intake to not more than 3 drinks a week (currently only having three drinks a year)     # Will also rule out genetic and autoimmune  causes of hepatobiliary disease.   Labs: F-actin, TTG/IgA, iron panel, ceruloplasmin, HFE mutation     - Consider/recommend follow up with non-invasive elastography or FibroScan in 3-5 years     - Follow-up in clinic as needed    Lawanda Loo PA-C   [facility] Hepatology     Total time for E/M services performed on the date of the encounter 45 minutes; excluding performing and official interpretation of fibrosis scan reads.  This included review of previous: clinic visits, hospital records, lab results, imaging studies, and procedural documentation. Time also includes patient visit, documentation and discussion with other providers.  The findings from this review are summarized in the above note.   -----------------------------------------------------       HPI:   Margot Kendrick is a 57 year old female  presenting for the evaluation of elevated LFT's.     Appetite is good. Drink very little dairy. Tries to protein shakes/hard boiled egg. Has breads. Can't have a big meal, has to eat small frequent meals.     Bowel movements are regular, twice daily. 2014, 188 lbs around menopause and then lost 40 lbs and then down 145 lbs. Mediterranean diet 192 and down to 170 lbs. Slowly up since then.     Swelling in right calf and ankles, no pitting edema. Join pains all over/ankles. Nerve pains and skin sensitivity for 10+ years. Given diagnosis of fibromyalgia. Evaluated by rheumatology in 2013, with two follow-ups.  Eye color change, now claudia.      Patient denies jaundice, lower extremity edema, abdominal distension or confusion.  Patient also denies melena, hematochezia or hematemesis. Patient denies weight loss, fevers, sweats or chills. Hot flashes.     PMH:    has a past medical history of Arthritis, Esophageal reflux, Migraine, unspecified, without mention of intractable migraine without mention of status migrainosus, Mitral valve disorders(424.0), and Myalgia and myositis, unspecified.     SMH:     has a past surgical history that includes surgical history of -  (12/92,06/87,12/93); surgical history of -  (03/01/1993); surgical history of -  (12/01/1993); surgical history of -  (02/01/1998); surgical history of -  (01/01/1999); surgical history of -  (03/2004/ 03/15/2005); surgical history of -  (07/31/2001); surgical history of -  (08/01/1998); surgical history of -  (03/01/2005); surgical history of -  (12/01/2005); surgical history of -  (04/11/2008); surgical history of -  (05/09/2008); surgical history of -  (12/01/2009); ENT surgery; colonoscopy; orthopedic surgery; Colonoscopy (06/11/2012); hysterectomy, vaginal (01/01/2002); Abdomen surgery (01/01/2005); appendectomy (01/01/2005); ENT surgery (01/01/2000); orthopedic surgery (2006/2009/2010); Esophagoscopy, gastroscopy, duodenoscopy (EGD), combined (N/A, 10/08/2015); Laparoscopic cholecystectomy (N/A, 10/27/2015); Eye surgery (01/01/2015); and Colonoscopy (N/A, 02/15/2016).     Medications:   Advil Tabs  LORazepam  meloxicam  naproxen sodium  omeprazole  SUMAtriptan  temazepam     Quit tobacco in 2013.  Bloody Elizabeth 3 times a day. No previous IV/IN drug use.    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     Brother with stage 3 liver fibrosis / fibromyalgia. Unclear etiology.     Niece with Elher's Danlos.  Sister with degenerative disc.     Previous work-up:   Lab Results   Component Value Date    HEPBANG Nonreactive 09/13/2022    HBCAB Nonreactive 09/13/2022    AUSAB 0.43 08/16/2023    HCVAB Nonreactive 09/13/2022     09/13/2022    CER 19 (L) 09/13/2022    SANDY Negative 06/10/2022    A1A 113 09/13/2022    TSH 1.24 06/07/2023    CHOL 219 (H) 06/07/2023    HDL 36 (L) 06/07/2023     (H) 06/07/2023    TRIG 238 (H) 06/07/2023        Recent Labs   Lab Test 08/16/23  1425 06/07/23  0740 09/13/22  1503 06/28/22  0656 06/10/22  0820 05/12/21  0733 10/27/20  0000 02/28/20  1634 04/06/18  0928 09/30/15  1410    ALKPHOS 52 47 51 43 46 42 46 44 42 50   * 80* 58* 89* 100* 44 48 41 25 57*   AST 68* 50* 38* 51* 54* 26 25 23 18 35           Allergies:     Allergies   Allergen Reactions     Asa [Aspirin] Nausea     TINGLING  FINGERS, HEAD,             Social History:     Social History     Socioeconomic History     Marital status:      Spouse name: Not on file     Number of children: Not on file     Years of education: Not on file     Highest education level: Not on file   Occupational History     Employer: [employer]   Tobacco Use     Smoking status: Former     Packs/day: 0.00     Years: 0.00     Pack years: 0.00     Types: Cigarettes     Quit date: 6/30/2013     Years since quitting: 10.1     Smokeless tobacco: Never     Tobacco comments:     quit plan offered   Substance and Sexual Activity     Alcohol use: No     Drug use: No     Sexual activity: Not Currently     Partners: Male     Birth control/protection: Female Surgical     Comment: leeann 1998   Other Topics Concern     Parent/sibling w/ CABG, MI or angioplasty before 65F 55M? No   Social History Narrative     Not on file     Social Determinants of Health     Financial Resource Strain: Not on file   Food Insecurity: Not on file   Transportation Needs: Not on file   Physical Activity: Not on file   Stress: Not on file   Social Connections: Not on file   Intimate Partner Violence: Not on file   Housing Stability: Not on file            Family History:     Family History   Problem Relation Age of Onset     C.A.D. Maternal Grandmother      Arthritis Maternal Grandmother         rheumatoid arthritis     Diabetes Maternal Grandmother      Cardiovascular Maternal Grandfather      Coronary Artery Disease Maternal Grandfather      Cardiovascular Paternal Grandmother      Cardiovascular Paternal Grandfather      Respiratory Daughter         ASTHMA     Neurologic Disorder Father         FIBROMYALGIA     Heart Disease Father 68     C.A.D. Father 68        MI  "    Osteoporosis Father      Neurologic Disorder Brother         FIBROMYALGIA     Neurologic Disorder Sister         FIBROMYALGIA     Anxiety Disorder Sister      Thyroid Disease Mother      Hypertension Mother      Hyperlipidemia Mother      Arthritis Maternal Aunt         psoriatic     Diabetes Other         Maternal Aunt     Coronary Artery Disease Other         Maternal Aunt     Hypertension Other         Maternal Aunt     Hyperlipidemia Other         Maternal Aunt     Melanoma No family hx of             Review of Systems:   Gen: See HPI     HEENT: No change in vision or hearing, mouth sores, dysphagia, lymph nodes  Resp: No shortness of breath, coughing, hx of asthma  CV: No chest pain, palpitations, syncope   GI: See HPI  : No dysuria, history of stones, urine color    Skin: No rash; no pruritus or psoriasis  MS: No arthralgias, myalgias, joint swelling  Neuro: No memory changes, confusion, numbness    Heme: No difficulty clotting, bruising, bleeding  Psych:  No anxiety, depression, agitation          Physical Exam:   BP (!) 140/78 (BP Location: Right arm, Patient Position: Sitting, Cuff Size: Adult Regular)   Pulse 113   Temp 98.2  F (36.8  C) (Oral)   Resp 20   Ht 1.651 m (5' 5\")   Wt 83.9 kg (184 lb 14.4 oz)   SpO2 97%   BMI 30.77 kg/m      Gen: A&Ox3, NAD, well developed  HEENT: non-icteric   CV: RRR, no overt murmurs  Lung: CTA Bilatererally, no wheezing or crackles.   Lym- no palpable lymphadenopathy  Abd: soft, NT, ND, no palpable splenomegaly, liver is not palpable.   Ext: no edema, intact pulses.   Skin: No rash, no palmar erythema, telangiectasias or jaundice  Neuro: grossly intact, no asterixis   Psych: appropriate mood and affects         Data:   Reviewed in person and significant for:    Lab Results   Component Value Date     08/16/2023     05/12/2021      Lab Results   Component Value Date    POTASSIUM 4.5 08/16/2023    POTASSIUM 4.0 06/28/2022    POTASSIUM 4.2 05/12/2021 "     Lab Results   Component Value Date    CHLORIDE 104 08/16/2023    CHLORIDE 108 06/28/2022    CHLORIDE 106 05/12/2021     Lab Results   Component Value Date    CO2 25 08/16/2023    CO2 26 06/28/2022    CO2 26 05/12/2021     Lab Results   Component Value Date    BUN 20.3 08/16/2023    BUN 16 06/28/2022    BUN 13 05/12/2021     Lab Results   Component Value Date    CR 0.75 08/16/2023    CR 0.76 05/12/2021       Lab Results   Component Value Date    WBC 6.5 08/16/2023    WBC 9.1 10/27/2020     Lab Results   Component Value Date    HGB 14.5 08/16/2023    HGB 14.4 10/27/2020     Lab Results   Component Value Date    HCT 44.4 08/16/2023    HCT 43.1 10/27/2020     Lab Results   Component Value Date    MCV 93 08/16/2023    MCV 93 10/27/2020     Lab Results   Component Value Date     08/16/2023     10/27/2020       Lab Results   Component Value Date    AST 68 08/16/2023    AST 26 05/12/2021     Lab Results   Component Value Date     08/16/2023    ALT 44 05/12/2021     Lab Results   Component Value Date    BILICONJ 0.0 01/13/2009      Lab Results   Component Value Date    BILITOTAL 0.7 08/16/2023    BILITOTAL 1.0 05/12/2021       Lab Results   Component Value Date    ALBUMIN 4.7 08/16/2023    ALBUMIN 4.0 06/28/2022    ALBUMIN 3.9 05/12/2021     Lab Results   Component Value Date    PROTTOTAL 7.6 08/16/2023    PROTTOTAL 7.4 05/12/2021      Lab Results   Component Value Date    ALKPHOS 52 08/16/2023    ALKPHOS 42 05/12/2021       Lab Results   Component Value Date    INR 0.93 08/16/2023         Imaging:        US ABDOMEN LIMITED 7/6/2022 6:47 AM     CLINICAL HISTORY: Elevated liver enzymes  TECHNIQUE: Limited abdominal ultrasound.     COMPARISON: 12/12/2016     FINDINGS:     GALLBLADDER: Absent.     BILE DUCTS: There is no biliary dilatation. The common duct measures  4mm.     LIVER: There is increased echogenicity of the liver parenchyma,  consistent with fatty infiltration. No focal hepatic mass.      RIGHT KIDNEY: No hydronephrosis.     PANCREAS: The visualized portions of the pancreas are normal.     No ascites.                                                                      IMPRESSION:  1.  Hepatic steatosis.  2.  Prior cholecystectomy.         Again, thank you for allowing me to participate in the care of your patient.        Sincerely,        Lawanda Loo PA-C

## 2023-08-22 LAB
FERRITIN SERPL-MCNC: 270 NG/ML (ref 11–328)
IRON BINDING CAPACITY (ROCHE): 274 UG/DL (ref 240–430)
IRON SATN MFR SERPL: 38 % (ref 15–46)
IRON SERPL-MCNC: 103 UG/DL (ref 37–145)
IRON SERPL-MCNC: 103 UG/DL (ref 37–145)

## 2023-09-14 ENCOUNTER — TRANSFERRED RECORDS (OUTPATIENT)
Dept: MULTI SPECIALTY CLINIC | Facility: CLINIC | Age: 58
End: 2023-09-14

## 2023-09-14 LAB — RETINOPATHY: NORMAL

## 2023-09-22 ENCOUNTER — LAB (OUTPATIENT)
Dept: LAB | Facility: CLINIC | Age: 58
End: 2023-09-22
Payer: COMMERCIAL

## 2023-09-22 DIAGNOSIS — K76.0 FATTY LIVER: ICD-10-CM

## 2023-09-22 LAB
LAB DIRECTOR COMMENTS: NORMAL
LAB DIRECTOR DISCLAIMER: NORMAL
LAB DIRECTOR INTERPRETATION: NORMAL
LAB DIRECTOR METHODOLOGY: NORMAL
LAB DIRECTOR RESULTS: NORMAL
SPECIMEN DESCRIPTION: NORMAL

## 2023-09-22 PROCEDURE — G0452 MOLECULAR PATHOLOGY INTERPR: HCPCS | Mod: 59 | Performed by: PATHOLOGY

## 2023-09-22 PROCEDURE — 83516 IMMUNOASSAY NONANTIBODY: CPT | Mod: 90

## 2023-09-22 PROCEDURE — 82784 ASSAY IGA/IGD/IGG/IGM EACH: CPT

## 2023-09-22 PROCEDURE — 81256 HFE GENE: CPT

## 2023-09-22 PROCEDURE — 86364 TISS TRNSGLTMNASE EA IG CLAS: CPT

## 2023-09-22 PROCEDURE — 82390 ASSAY OF CERULOPLASMIN: CPT

## 2023-09-22 PROCEDURE — 36415 COLL VENOUS BLD VENIPUNCTURE: CPT

## 2023-09-22 PROCEDURE — 99000 SPECIMEN HANDLING OFFICE-LAB: CPT

## 2023-09-24 LAB — SMA IGG SER-ACNC: 7 UNITS

## 2023-09-25 LAB
CERULOPLASMIN SERPL-MCNC: 15 MG/DL (ref 20–60)
IGA SERPL-MCNC: 245 MG/DL (ref 84–499)

## 2023-09-26 LAB
TTG IGA SER-ACNC: 0.7 U/ML
TTG IGG SER-ACNC: <0.6 U/ML

## 2023-11-07 NOTE — PROGRESS NOTES
Chief Complaint   Patient presents with    Consult     Hoarseness, headaches on right side of face with watery eye and tooth pain.  Swallowing issues, unable to burp, had scope done previously.  Had sinus surgery in [address] [place] [address].      History of Present Illness  Margot Kendrick is a 58 year old female who presents today for evaluation.  I am seeing this patient in consultation for hoarseness at the request of the provider Dr. Haley. The patient reports a several month history of intermittent hoarseness that seems to be worse as the day goes on and when she is using her voice.  This does not happen every day but seems to happen most days.  She feels like she also gets headaches and facial pressure on the days where her voice seems to be worse.  She does have a history of migraine headache and does take sumatriptan a few times a month with good resolution of symptoms.  She also has a history of having a left-sided endoscopic sinus surgery several decades ago.  When she is having the headaches, she will get eye watering, nose watering, nose plugging.  The headaches seem to be more on the right-hand side.      With regards to throat symptoms, the patient does report intermittent trouble swallowing has been going on for decades.  She also has trouble burping at times so she avoids carbonation.  She does get breakthrough reflux/heartburn symptoms when she eats the wrong thing.  When this initially started, she started 40 mg of Prilosec daily for 2 weeks and had improvement in her voice.  She subsequently stopped the medication and a couple months later her symptoms returned.  She also felt like her headaches were improved when she was taking the reflux medication.  She denies any odynophagia, pharyngodynia, hemoptysis, neck lumps/bumps/swelling.  No unintentional weight loss.  Former smoker quitting over a decade ago.      Past Medical History  Patient Active Problem List   Diagnosis    Abdominal pain, generalized     Irritable bowel syndrome    Esophageal reflux    Sensorineural hearing loss    Tobacco abuse    Chiari malformation type I (H)    FAMILY HISTORY OF BLOOD DISORDER-NONSPEC    Migraine headache    Vitamin D deficiency    CARDIOVASCULAR SCREENING; LDL GOAL LESS THAN 160    Insomnia    Fibromyalgia    Multiple joint pain     Current Medications     Current Outpatient Medications:     ADVIL 200 MG OR TABS, 2 tablets by mouth as needed, Disp: , Rfl:     famotidine (PEPCID) 40 MG tablet, Take 1 tablet (40 mg) by mouth at bedtime, Disp: 90 tablet, Rfl: 3    LORazepam (ATIVAN) 0.5 MG tablet, Take 1 tablet (0.5 mg) by mouth daily For panic or for flying, Disp: 30 tablet, Rfl: 0    meloxicam (MOBIC) 7.5 MG tablet, Take 1 tablet (7.5 mg) by mouth daily, Disp: 30 tablet, Rfl: 5    naproxen sodium 220 MG capsule, Take 220 mg by mouth as needed, Disp: , Rfl:     omeprazole (PRILOSEC) 40 MG DR capsule, Take 1 capsule (40 mg) by mouth daily Take 20-30 minutes prior to morning meal, Disp: 90 capsule, Rfl: 1    omeprazole (PRILOSEC) 40 MG DR capsule, Take 1 capsule (40 mg) by mouth daily, Disp: 90 capsule, Rfl: 0    temazepam (RESTORIL) 15 MG capsule, Take 2 capsules (30 mg) by mouth nightly as needed for sleep, Disp: 60 capsule, Rfl: 5    SUMAtriptan (IMITREX) 50 MG tablet, Take 1 tablet (50 mg) by mouth at onset of headache for migraine May repeat dose in 2 hours.  Do not exceed 200 mg in 24 hours (Patient not taking: Reported on 11/13/2023), Disp: 18 tablet, Rfl: 3    Allergies  Allergies   Allergen Reactions    Asa [Aspirin] Nausea     TINGLING  FINGERS, HEAD,        Social History   Social History     Socioeconomic History    Marital status:    Occupational History     Employer: [employer]   Tobacco Use    Smoking status: Former     Packs/day: 0.00     Years: 0.00     Additional pack years: 0.00     Total pack years: 0.00     Types: Cigarettes     Quit date: 6/30/2013     Years since quitting: 10.3     "Smokeless tobacco: Never    Tobacco comments:     quit plan offered   Substance and Sexual Activity    Alcohol use: No    Drug use: No    Sexual activity: Not Currently     Partners: Male     Birth control/protection: Female Surgical     Comment: leeann 1998   Other Topics Concern    Parent/sibling w/ CABG, MI or angioplasty before 65F 55M? No       Family History  Family History   Problem Relation Age of Onset    C.A.D. Maternal Grandmother     Arthritis Maternal Grandmother         rheumatoid arthritis    Diabetes Maternal Grandmother     Cardiovascular Maternal Grandfather     Coronary Artery Disease Maternal Grandfather     Cardiovascular Paternal Grandmother     Cardiovascular Paternal Grandfather     Respiratory Daughter         ASTHMA    Neurologic Disorder Father         FIBROMYALGIA    Heart Disease Father 68    C.A.D. Father 68        MI    Osteoporosis Father     Neurologic Disorder Brother         FIBROMYALGIA    Neurologic Disorder Sister         FIBROMYALGIA    Anxiety Disorder Sister     Thyroid Disease Mother     Hypertension Mother     Hyperlipidemia Mother     Arthritis Maternal Aunt         psoriatic    Diabetes Other         Maternal Aunt    Coronary Artery Disease Other         Maternal Aunt    Hypertension Other         Maternal Aunt    Hyperlipidemia Other         Maternal Aunt    Melanoma No family hx of        Review of Systems  As per HPI and PMHx, otherwise 10+ comprehensive system review is negative.    Physical Exam  /79   Pulse 101   Ht 1.651 m (5' 5\")   Wt 83.9 kg (185 lb)   SpO2 99%   BMI 30.79 kg/m    GENERAL: Patient is a pleasant, cooperative 58 year old female in no acute distress.  HEAD: Normocephalic, atraumatic.  Hair and scalp are normal.  EYES: Pupils are equal, round, reactive to light and accommodation.  Extraocular movements are intact.  The sclera nonicteric without injection.  The extraocular structures are normal.  EARS: Normal shape and symmetry.  No " tenderness when palpating the mastoid or tragal areas bilaterally.    NOSE: Nares are patent.  Nasal mucosa is pink and moist.  The patient has a leftward anterior mid nasal septal deviation with a spur more posteriorly.  No nasal cavity masses, polyps, or mucopurulence on anterior rhinoscopy.  ORAL CAVITY: Dentition is in good repair.  Mucous membranes are moist.  Tongue is mobile, protrudes to the midline.  Palate elevates symmetrically.  Tonsils are 1+, symmetric.  No erythema or exudate.  No oral cavity or oropharyngeal masses, lesions, ulcerations, leukoplakia.  NECK: Supple, trachea is midline.  There no palpable cervical lymphadenopathy or masses bilaterally.  Palpation of the bilateral parotid and submandibular areas reveal no masses.  No thyromegaly.    NEUROLOGIC: Cranial nerves II through XII are grossly intact.  The patients voice is reasonably strong without any significant vocal breaks.  Patient is House-Brackmann I/VI bilaterally.  CARDIOVASCULAR: Extremities are warm and well-perfused.  No significant peripheral edema.  RESPIRATORY: Patient has nonlabored breathing without cough, wheeze, stridor.  PSYCHIATRIC: Patient is alert and oriented.  Mood and affect appear normal.  SKIN: Warm and dry.  No scalp, face, or neck lesions noted.    Procedure: Flexible Laryngoscopy  Indication: Dysphonia    To best visualize the upper airway anatomy and due to the chief complaint and HPI, I proceeded with flexible fiberoptic laryngoscopy examination.  The bilateral nasal cavities were anesthetized and decongested with a mixture of lidocaine and neosynephrine.  The bilateral nasal cavities were examined using a flexible fiberoptic laryngoscope.  On the right, the patient had a normal accessory ostia of the maxilla.  No drainage.  The left-hand side, she had postsurgical changes of the maxillary antrostomy and anterior ethmoidectomy.  The sphenoethmoid recess was clear, frontal sinus outflow area was clear.  There  were no nasal cavity masses, polyps, or mucopurulence bilaterally.  The nasal septum deviates to the left of the anterior mid septum with a spur more posteriorly.  The nasopharynx had a normal appearance with normal Eustachian tube openings and fossa of Rosenmuller bilaterally.  Minimal adenoid tissue.  There is a moderate amount of posterior oropharyngeal cobblestoning that extends down to the piriforms.  The base of tongue, vallecula, epiglottis, aryepiglottic folds, arytenoids, and piriform sinuses were without mass or lesion.  There is a moderate amount of interarytenoid thickening and a mild amount of erythema.  The bilateral true vocal folds were symmetrically mobile without nodules or masses.  The visualized portions of the infraglottic and subglottic airway are unremarkable.  The scope was removed.  The patient tolerated the procedure well.                    Assessment and Plan    ICD-10-CM    1. Hoarseness  R49.0 LARYNGOSCOPY FLEX FIBEROPTIC, DIAGNOSTIC     Adult ENT  Referral     CT Sinus w/o Contrast     omeprazole (PRILOSEC) 40 MG DR capsule     famotidine (PEPCID) 40 MG tablet     Speech Therapy Referral      2. Sinus headache  R51.9 LARYNGOSCOPY FLEX FIBEROPTIC, DIAGNOSTIC     CT Sinus w/o Contrast     omeprazole (PRILOSEC) 40 MG DR capsule     famotidine (PEPCID) 40 MG tablet     Speech Therapy Referral      3. Right sided facial pain  R51.9 LARYNGOSCOPY FLEX FIBEROPTIC, DIAGNOSTIC     CT Sinus w/o Contrast     omeprazole (PRILOSEC) 40 MG DR capsule     famotidine (PEPCID) 40 MG tablet     Speech Therapy Referral      4. History of migraine headaches  Z86.69 LARYNGOSCOPY FLEX FIBEROPTIC, DIAGNOSTIC     CT Sinus w/o Contrast     omeprazole (PRILOSEC) 40 MG DR capsule     famotidine (PEPCID) 40 MG tablet     Speech Therapy Referral      5. Laryngopharyngeal reflux  K21.9 LARYNGOSCOPY FLEX FIBEROPTIC, DIAGNOSTIC     CT Sinus w/o Contrast     omeprazole (PRILOSEC) 40 MG DR capsule     famotidine  (PEPCID) 40 MG tablet     Speech Therapy Referral         It was my pleasure seeing Margot Kendrick today in clinic.  The patient Zentz today primarily with hoarse voice.  She had improvement when she was taking the Prilosec previously but stopped the medication.  Her endoscopic exam does not show evidence of infection, significant inflammation, or neoplasm on exam to explain the symptoms. I think the most likely etiology is laryngopharyngeal reflux with some concomitant muscle tension dysphonia.  We discussed dual acid suppression therapy using 40 mg of Prilosec once daily 20-30 minutes prior to morning meal and 40 mg of Pepcid at bedtime.  We discussed lifestyle changes including avoidance of certain foods, sleeping on an incline, and avoiding eating within 3-4 hours of bedtime. We also discussed the role of speech therapy and a referral was placed.     The patient is also complaining of facial pressure and sinus headache 3-4 times a week.  She will take Imitrex with improvement in symptoms when the symptoms are severe.  She has a history of sinus surgery but a normal endoscopic nasal exam today.  I offered the patient a CT scan of the sinuses to rule out any signs of significant sinus disease.  My guess is that this is atypical or sinus related migraine headache.  The patient had up an appoint with neurology but did cancel this appointment.  I encouraged her to follow-up with neurology to discuss the possibility of migraine headache management.     I will send the patient a [employer]book[employer] message in 6 to 8 weeks to see how her throat is doing.  If she is improving, we could discuss tapering down the reflux medications.    Santy Celaya MD  Department of Otolaryngology-Head and Neck Surgery  [facility]

## 2023-11-10 DIAGNOSIS — R79.89 ELEVATED LFTS: ICD-10-CM

## 2023-11-10 DIAGNOSIS — K76.0 FATTY LIVER: Primary | ICD-10-CM

## 2023-11-13 ENCOUNTER — OFFICE VISIT (OUTPATIENT)
Dept: OTOLARYNGOLOGY | Facility: CLINIC | Age: 58
End: 2023-11-13
Payer: COMMERCIAL

## 2023-11-13 VITALS
DIASTOLIC BLOOD PRESSURE: 79 MMHG | BODY MASS INDEX: 30.82 KG/M2 | WEIGHT: 185 LBS | SYSTOLIC BLOOD PRESSURE: 137 MMHG | HEIGHT: 65 IN | OXYGEN SATURATION: 99 % | HEART RATE: 101 BPM

## 2023-11-13 DIAGNOSIS — R49.0 HOARSENESS: Primary | ICD-10-CM

## 2023-11-13 DIAGNOSIS — K21.9 LARYNGOPHARYNGEAL REFLUX: ICD-10-CM

## 2023-11-13 DIAGNOSIS — R51.9 SINUS HEADACHE: ICD-10-CM

## 2023-11-13 DIAGNOSIS — Z86.69 HISTORY OF MIGRAINE HEADACHES: ICD-10-CM

## 2023-11-13 DIAGNOSIS — R51.9 RIGHT SIDED FACIAL PAIN: ICD-10-CM

## 2023-11-13 PROCEDURE — 31575 DIAGNOSTIC LARYNGOSCOPY: CPT | Performed by: OTOLARYNGOLOGY

## 2023-11-13 PROCEDURE — 99204 OFFICE O/P NEW MOD 45 MIN: CPT | Mod: 25 | Performed by: OTOLARYNGOLOGY

## 2023-11-13 RX ORDER — OMEPRAZOLE 40 MG/1
40 CAPSULE, DELAYED RELEASE ORAL DAILY
Qty: 90 CAPSULE | Refills: 1 | Status: SHIPPED | OUTPATIENT
Start: 2023-11-13 | End: 2024-10-03

## 2023-11-13 RX ORDER — FAMOTIDINE 40 MG/1
40 TABLET, FILM COATED ORAL AT BEDTIME
Qty: 90 TABLET | Refills: 3 | Status: SHIPPED | OUTPATIENT
Start: 2023-11-13

## 2023-11-13 ASSESSMENT — PAIN SCALES - GENERAL: PAINLEVEL: NO PAIN (0)

## 2023-11-13 NOTE — LETTER
11/13/2023         RE: Margot Kendrick  [address] [facility] [id number]        Dear Colleague,    Thank you for referring your patient, Margot Kendrick, to the [facility]. Please see a copy of my visit note below.    Chief Complaint   Patient presents with     Consult     Hoarseness, headaches on right side of face with watery eye and tooth pain.  Swallowing issues, unable to burp, had scope done previously.  Had sinus surgery in 2001ish.      History of Present Illness  Margot Kendrick is a 58 year old female who presents today for evaluation.  I am seeing this patient in consultation for hoarseness at the request of the provider Dr. Haley. The patient reports a several month history of intermittent hoarseness that seems to be worse as the day goes on and when she is using her voice.  This does not happen every day but seems to happen most days.  She feels like she also gets headaches and facial pressure on the days where her voice seems to be worse.  She does have a history of migraine headache and does take sumatriptan a few times a month with good resolution of symptoms.  She also has a history of having a left-sided endoscopic sinus surgery several decades ago.  When she is having the headaches, she will get eye watering, nose watering, nose plugging.  The headaches seem to be more on the right-hand side.      With regards to throat symptoms, the patient does report intermittent trouble swallowing has been going on for decades.  She also has trouble burping at times so she avoids carbonation.  She does get breakthrough reflux/heartburn symptoms when she eats the wrong thing.  When this initially started, she started 40 mg of Prilosec daily for 2 weeks and had improvement in her voice.  She subsequently stopped the medication and a couple months later her symptoms returned.  She also felt like her headaches were improved when she was taking the  reflux medication.  She denies any odynophagia, pharyngodynia, hemoptysis, neck lumps/bumps/swelling.  No unintentional weight loss.  Former smoker quitting over a decade ago.      Past Medical History  Patient Active Problem List   Diagnosis     Abdominal pain, generalized     Irritable bowel syndrome     Esophageal reflux     Sensorineural hearing loss     Tobacco abuse     Chiari malformation type I (H)     FAMILY HISTORY OF BLOOD DISORDER-NONSPEC     Migraine headache     Vitamin D deficiency     CARDIOVASCULAR SCREENING; LDL GOAL LESS THAN 160     Insomnia     Fibromyalgia     Multiple joint pain     Current Medications     Current Outpatient Medications:      ADVIL 200 MG OR TABS, 2 tablets by mouth as needed, Disp: , Rfl:      famotidine (PEPCID) 40 MG tablet, Take 1 tablet (40 mg) by mouth at bedtime, Disp: 90 tablet, Rfl: 3     LORazepam (ATIVAN) 0.5 MG tablet, Take 1 tablet (0.5 mg) by mouth daily For panic or for flying, Disp: 30 tablet, Rfl: 0     meloxicam (MOBIC) 7.5 MG tablet, Take 1 tablet (7.5 mg) by mouth daily, Disp: 30 tablet, Rfl: 5     naproxen sodium 220 MG capsule, Take 220 mg by mouth as needed, Disp: , Rfl:      omeprazole (PRILOSEC) 40 MG DR capsule, Take 1 capsule (40 mg) by mouth daily Take 20-30 minutes prior to morning meal, Disp: 90 capsule, Rfl: 1     omeprazole (PRILOSEC) 40 MG DR capsule, Take 1 capsule (40 mg) by mouth daily, Disp: 90 capsule, Rfl: 0     temazepam (RESTORIL) 15 MG capsule, Take 2 capsules (30 mg) by mouth nightly as needed for sleep, Disp: 60 capsule, Rfl: 5     SUMAtriptan (IMITREX) 50 MG tablet, Take 1 tablet (50 mg) by mouth at onset of headache for migraine May repeat dose in 2 hours.  Do not exceed 200 mg in 24 hours (Patient not taking: Reported on 11/13/2023), Disp: 18 tablet, Rfl: 3    Allergies  Allergies   Allergen Reactions     Asa [Aspirin] Nausea     TINGLING  FINGERS, HEAD,        Social History   Social History     Socioeconomic History     Marital  "status:    Occupational History     Employer: [employer]   Tobacco Use     Smoking status: Former     Packs/day: 0.00     Years: 0.00     Additional pack years: 0.00     Total pack years: 0.00     Types: Cigarettes     Quit date: 6/30/2013     Years since quitting: 10.3     Smokeless tobacco: Never     Tobacco comments:     quit plan offered   Substance and Sexual Activity     Alcohol use: No     Drug use: No     Sexual activity: Not Currently     Partners: Male     Birth control/protection: Female Surgical     Comment: leeann 1998   Other Topics Concern     Parent/sibling w/ CABG, MI or angioplasty before 65F 55M? No       Family History  Family History   Problem Relation Age of Onset     C.A.D. Maternal Grandmother      Arthritis Maternal Grandmother         rheumatoid arthritis     Diabetes Maternal Grandmother      Cardiovascular Maternal Grandfather      Coronary Artery Disease Maternal Grandfather      Cardiovascular Paternal Grandmother      Cardiovascular Paternal Grandfather      Respiratory Daughter         ASTHMA     Neurologic Disorder Father         FIBROMYALGIA     Heart Disease Father 68     C.A.D. Father 68        MI     Osteoporosis Father      Neurologic Disorder Brother         FIBROMYALGIA     Neurologic Disorder Sister         FIBROMYALGIA     Anxiety Disorder Sister      Thyroid Disease Mother      Hypertension Mother      Hyperlipidemia Mother      Arthritis Maternal Aunt         psoriatic     Diabetes Other         Maternal Aunt     Coronary Artery Disease Other         Maternal Aunt     Hypertension Other         Maternal Aunt     Hyperlipidemia Other         Maternal Aunt     Melanoma No family hx of        Review of Systems  As per HPI and PMHx, otherwise 10+ comprehensive system review is negative.    Physical Exam  /79   Pulse 101   Ht 1.651 m (5' 5\")   Wt 83.9 kg (185 lb)   SpO2 99%   BMI 30.79 kg/m    GENERAL: Patient is a pleasant, cooperative 58 year old " female in no acute distress.  HEAD: Normocephalic, atraumatic.  Hair and scalp are normal.  EYES: Pupils are equal, round, reactive to light and accommodation.  Extraocular movements are intact.  The sclera nonicteric without injection.  The extraocular structures are normal.  EARS: Normal shape and symmetry.  No tenderness when palpating the mastoid or tragal areas bilaterally.    NOSE: Nares are patent.  Nasal mucosa is pink and moist.  The patient has a leftward anterior mid nasal septal deviation with a spur more posteriorly.  No nasal cavity masses, polyps, or mucopurulence on anterior rhinoscopy.  ORAL CAVITY: Dentition is in good repair.  Mucous membranes are moist.  Tongue is mobile, protrudes to the midline.  Palate elevates symmetrically.  Tonsils are 1+, symmetric.  No erythema or exudate.  No oral cavity or oropharyngeal masses, lesions, ulcerations, leukoplakia.  NECK: Supple, trachea is midline.  There no palpable cervical lymphadenopathy or masses bilaterally.  Palpation of the bilateral parotid and submandibular areas reveal no masses.  No thyromegaly.    NEUROLOGIC: Cranial nerves II through XII are grossly intact.  The patients voice is reasonably strong without any significant vocal breaks.  Patient is House-Brackmann I/VI bilaterally.  CARDIOVASCULAR: Extremities are warm and well-perfused.  No significant peripheral edema.  RESPIRATORY: Patient has nonlabored breathing without cough, wheeze, stridor.  PSYCHIATRIC: Patient is alert and oriented.  Mood and affect appear normal.  SKIN: Warm and dry.  No scalp, face, or neck lesions noted.    Procedure: Flexible Laryngoscopy  Indication: Dysphonia    To best visualize the upper airway anatomy and due to the chief complaint and HPI, I proceeded with flexible fiberoptic laryngoscopy examination.  The bilateral nasal cavities were anesthetized and decongested with a mixture of lidocaine and neosynephrine.  The bilateral nasal cavities were examined  using a flexible fiberoptic laryngoscope.  On the right, the patient had a normal accessory ostia of the maxilla.  No drainage.  The left-hand side, she had postsurgical changes of the maxillary antrostomy and anterior ethmoidectomy.  The sphenoethmoid recess was clear, frontal sinus outflow area was clear.  There were no nasal cavity masses, polyps, or mucopurulence bilaterally.  The nasal septum deviates to the left of the anterior mid septum with a spur more posteriorly.  The nasopharynx had a normal appearance with normal Eustachian tube openings and fossa of Rosenmuller bilaterally.  Minimal adenoid tissue.  There is a moderate amount of posterior oropharyngeal cobblestoning that extends down to the piriforms.  The base of tongue, vallecula, epiglottis, aryepiglottic folds, arytenoids, and piriform sinuses were without mass or lesion.  There is a moderate amount of interarytenoid thickening and a mild amount of erythema.  The bilateral true vocal folds were symmetrically mobile without nodules or masses.  The visualized portions of the infraglottic and subglottic airway are unremarkable.  The scope was removed.  The patient tolerated the procedure well.                    Assessment and Plan    ICD-10-CM    1. Hoarseness  R49.0 LARYNGOSCOPY FLEX FIBEROPTIC, DIAGNOSTIC     Adult ENT  Referral     CT Sinus w/o Contrast     omeprazole (PRILOSEC) 40 MG DR capsule     famotidine (PEPCID) 40 MG tablet     Speech Therapy Referral      2. Sinus headache  R51.9 LARYNGOSCOPY FLEX FIBEROPTIC, DIAGNOSTIC     CT Sinus w/o Contrast     omeprazole (PRILOSEC) 40 MG DR capsule     famotidine (PEPCID) 40 MG tablet     Speech Therapy Referral      3. Right sided facial pain  R51.9 LARYNGOSCOPY FLEX FIBEROPTIC, DIAGNOSTIC     CT Sinus w/o Contrast     omeprazole (PRILOSEC) 40 MG DR capsule     famotidine (PEPCID) 40 MG tablet     Speech Therapy Referral      4. History of migraine headaches  Z86.69 LARYNGOSCOPY FLEX  FIBEROPTIC, DIAGNOSTIC     CT Sinus w/o Contrast     omeprazole (PRILOSEC) 40 MG DR capsule     famotidine (PEPCID) 40 MG tablet     Speech Therapy Referral      5. Laryngopharyngeal reflux  K21.9 LARYNGOSCOPY FLEX FIBEROPTIC, DIAGNOSTIC     CT Sinus w/o Contrast     omeprazole (PRILOSEC) 40 MG DR capsule     famotidine (PEPCID) 40 MG tablet     Speech Therapy Referral         It was my pleasure seeing Margot Kendrick today in clinic.  The patient Zentz today primarily with hoarse voice.  She had improvement when she was taking the Prilosec previously but stopped the medication.  Her endoscopic exam does not show evidence of infection, significant inflammation, or neoplasm on exam to explain the symptoms. I think the most likely etiology is laryngopharyngeal reflux with some concomitant muscle tension dysphonia.  We discussed dual acid suppression therapy using 40 mg of Prilosec once daily 20-30 minutes prior to morning meal and 40 mg of Pepcid at bedtime.  We discussed lifestyle changes including avoidance of certain foods, sleeping on an incline, and avoiding eating within 3-4 hours of bedtime. We also discussed the role of speech therapy and a referral was placed.     The patient is also complaining of facial pressure and sinus headache 3-4 times a week.  She will take Imitrex with improvement in symptoms when the symptoms are severe.  She has a history of sinus surgery but a normal endoscopic nasal exam today.  I offered the patient a CT scan of the sinuses to rule out any signs of significant sinus disease.  My guess is that this is atypical or sinus related migraine headache.  The patient had up an appoint with neurology but did cancel this appointment.  I encouraged her to follow-up with neurology to discuss the possibility of migraine headache management.     I will send the patient a BUX message in 6 to 8 weeks to see how her throat is doing.  If she is improving, we could discuss tapering  down the reflux medications.    Santy Celaya MD  Department of Otolaryngology-Head and Neck Surgery  [facility]       Again, thank you for allowing me to participate in the care of your patient.        Sincerely,        Santy Celaya MD

## 2023-11-20 ENCOUNTER — HOSPITAL ENCOUNTER (OUTPATIENT)
Dept: CT IMAGING | Facility: CLINIC | Age: 58
Discharge: HOME | End: 2023-11-20
Attending: OTOLARYNGOLOGY | Admitting: OTOLARYNGOLOGY
Payer: COMMERCIAL

## 2023-11-20 DIAGNOSIS — Z86.69 HISTORY OF MIGRAINE HEADACHES: ICD-10-CM

## 2023-11-20 DIAGNOSIS — K21.9 LARYNGOPHARYNGEAL REFLUX: ICD-10-CM

## 2023-11-20 DIAGNOSIS — R49.0 HOARSENESS: ICD-10-CM

## 2023-11-20 DIAGNOSIS — R51.9 SINUS HEADACHE: ICD-10-CM

## 2023-11-20 DIAGNOSIS — R51.9 RIGHT SIDED FACIAL PAIN: ICD-10-CM

## 2023-11-20 PROCEDURE — 70486 CT MAXILLOFACIAL W/O DYE: CPT

## 2023-11-21 DIAGNOSIS — K76.0 FATTY LIVER: Primary | ICD-10-CM

## 2023-11-21 DIAGNOSIS — R79.89 ELEVATED LFTS: ICD-10-CM

## 2023-11-21 DIAGNOSIS — E83.00 LOW CERULOPLASMIN LEVEL (H): ICD-10-CM

## 2023-11-27 PROCEDURE — 99000 SPECIMEN HANDLING OFFICE-LAB: CPT

## 2023-11-27 PROCEDURE — 82525 ASSAY OF COPPER: CPT | Mod: 90

## 2023-12-02 LAB
COPPER 24H UR-MRATE: 14.8 UG/D
COPPER UR-MCNC: 1 UG/DL
COPPER/CREAT UR: 9.9 UG/G CRT
CREAT 24H UR-MRATE: 1490 MG/D
CREAT UR-MCNC: 101 MG/DL

## 2023-12-11 ENCOUNTER — TRANSFERRED RECORDS (OUTPATIENT)
Dept: HEALTH INFORMATION MANAGEMENT | Facility: CLINIC | Age: 58
End: 2023-12-11
Payer: COMMERCIAL

## 2023-12-21 ENCOUNTER — TRANSFERRED RECORDS (OUTPATIENT)
Dept: HEALTH INFORMATION MANAGEMENT | Facility: CLINIC | Age: 58
End: 2023-12-21
Payer: COMMERCIAL

## 2024-03-18 ENCOUNTER — TRANSFERRED RECORDS (OUTPATIENT)
Dept: HEALTH INFORMATION MANAGEMENT | Facility: CLINIC | Age: 59
End: 2024-03-18
Payer: COMMERCIAL

## 2024-04-03 ENCOUNTER — OFFICE VISIT (OUTPATIENT)
Dept: PODIATRY | Facility: CLINIC | Age: 59
End: 2024-04-03
Payer: COMMERCIAL

## 2024-04-03 VITALS
SYSTOLIC BLOOD PRESSURE: 128 MMHG | HEIGHT: 65 IN | HEART RATE: 79 BPM | WEIGHT: 185 LBS | BODY MASS INDEX: 30.82 KG/M2 | DIASTOLIC BLOOD PRESSURE: 79 MMHG

## 2024-04-03 DIAGNOSIS — M72.2 PLANTAR FASCIITIS, LEFT: ICD-10-CM

## 2024-04-03 DIAGNOSIS — M72.2 PLANTAR FASCIITIS, RIGHT: Primary | ICD-10-CM

## 2024-04-03 PROCEDURE — 20550 NJX 1 TENDON SHEATH/LIGAMENT: CPT | Mod: 50 | Performed by: PODIATRIST

## 2024-04-03 PROCEDURE — 99213 OFFICE O/P EST LOW 20 MIN: CPT | Mod: 25 | Performed by: PODIATRIST

## 2024-04-03 RX ORDER — LIDOCAINE HYDROCHLORIDE 10 MG/ML
1 INJECTION, SOLUTION INFILTRATION; PERINEURAL ONCE
Status: COMPLETED | OUTPATIENT
Start: 2024-04-03 | End: 2024-04-03

## 2024-04-03 RX ORDER — TRIAMCINOLONE ACETONIDE 40 MG/ML
40 INJECTION, SUSPENSION INTRA-ARTICULAR; INTRAMUSCULAR ONCE
Status: COMPLETED | OUTPATIENT
Start: 2024-04-03 | End: 2024-04-03

## 2024-04-03 RX ADMIN — TRIAMCINOLONE ACETONIDE 40 MG: 40 INJECTION, SUSPENSION INTRA-ARTICULAR; INTRAMUSCULAR at 16:01

## 2024-04-03 RX ADMIN — TRIAMCINOLONE ACETONIDE 40 MG: 40 INJECTION, SUSPENSION INTRA-ARTICULAR; INTRAMUSCULAR at 16:00

## 2024-04-03 RX ADMIN — LIDOCAINE HYDROCHLORIDE 1 ML: 10 INJECTION, SOLUTION INFILTRATION; PERINEURAL at 15:59

## 2024-04-03 ASSESSMENT — PAIN SCALES - GENERAL: PAINLEVEL: SEVERE PAIN (6)

## 2024-04-03 NOTE — Clinical Note
4/3/2024         RE: Margot Kendrick  [address] [facility] [address]        Dear Colleague,    Thank you for referring your patient, Margot Kendrick, to the [facility] [place]. Please see a copy of my visit note below.    Margto returns to the office for reevaluation of the {RIGHT /LEFT:477933} foot.  The patient relates following the instructions given at the last visit with noted overall {LESS/MORE:178052} pain and {LESS/MORE:993659} improvement in function of the {RIGHT /LEFT:482818} foot.   The patient relates no other problems.    Vitals: There were no vitals taken for this visit.  BMI= There is no height or weight on file to calculate BMI.    Lower Extremity Physical Exam:      Neurovascular status remains unchanged.  Muscular exam is within normal limits to major muscle groups.  Integument is intact.      Noted ***    Diagnostics:  Radiograph evaluation including three views of the {RIGHT /LEFT:593642} foot reveals interval healing with increased trabeculation of the ***.  I personally evaluated the images as well as reviewed the images with the patient pointing out the findings.      Assessment:     ICD-10-CM    1. Plantar fasciitis, right  M72.2           Plan:    I have explained to Margot about the progress of the conditions.  At this time, ***    Margot verbalized agreement with and understanding of the rational for the diagnosis and treatment plan.  All questions were answered to best of my ability and the patient's satisfaction. The patient was advised to contact the clinic with any questions that may arise after the clinic visit.      Disclaimer: This note consists of symbols derived from keyboarding, dictation and/or voice recognition software. As a result, there may be errors in the script that have gone undetected. Please consider this when interpreting information found in this chart.       SUZANNE Morris D.P.M., F.MEHUL.C.F.A.S.        Again,  thank you for allowing me to participate in the care of your patient.        Sincerely,        Panchito Morris DPM

## 2024-04-03 NOTE — PATIENT INSTRUCTIONS
Next Steps:      Support:  Wear supportive shoes, sandals, boots and/or inserts that have a rigid supportive sole.    This will offload the majority of tension forces that travel through your feet every step you take.    Skechers Max Cushioning Elite/Premier   Skechers Relax Fit D'Lux Walker  Reebok Walk Ultra 7 DMX MAX   Hoka Bondi walking shoes  Active Storage shoes/slippers (https://Charity[employer]zz.Bullhorn)  Cognitive Match sandals (https://www.[employer]/us)  Superfeet inserts (www.superfeet.com)  It is important that you also wear supportive shoe wear in the house to continue providing support to your feet.    You may always use a cushioned liner for your shoes if that makes your feet feel better.  Stretching  Calf stretching is essential to offload the tension forces that travel through your feet every step you take  Preferred calf stretch is the Runner's Stretch  Place one foot behind the other foot, flat against the ground (it is important to keep the heel on the ground).  The back leg is the one that will be stretched.  Start with the knee straight and lean your hips into the wall, counter or whatever you are leaning into - count to ten.  Next, bend the knee.  You should feel the stretch lower in the calf muscle - count to ten.  Repeat this stretch once an hour to start off with.  When symptoms subside, I recommend performing the stretch 3 times daily to prevent any future problems.                Tissue Massage  It is important that you physically loosen the inflammation tissue to help your body heal the injured tissue.  I recommend soaking your foot in warm water to increase the microcirculation to the soft tissues.  You may add Epson salt to the water if you prefer.  You may apply an over-the-counter muscle rub, such as Voltaren gel, and deeply massage the injured tissue.  Reduce Inflammation  You can ice the injured tissue with an ice pack with a light cloth covering or soaking in ice water 20 minutes to reduce  any acute inflammation, typically at the end of the day.      It is important to understand that most problems that develop in the foot and ankle are caused by excessive tension that cause microinjury to the soft tissues and inflammation in the foot and ankle.  By addressing the underlying causes with support and stretching as well as treating the current inflammatory conditions with tissue massage and anti-inflammatory treatments, most foot and ankle musculoskeletal conditions will resolve.  This may take time to heal.  However, if symptoms persist past 4 weeks you should return to the office for reevaluation to determine further treatment options.    After the Injection     After the injection, strenuous and repetitive activity should be minimized for approximately 48 hours.   Ice should be applied to the injected area at least for the next 48 hours.   Apply ice to the injected area at least 3 - 4 times a day for 20 minutes each time for the next 48 hours. This can reduce the painful  flare  reaction that can follow an injection the next day. This reaction can cause the area that was injected to hurt more the next day just from the injection. This will resolve within a day if it does occur.     Use over-the-counter pain medications such as Tylenol to help with the pain if necessary.     After 48 hours, icing the area may be continued if you find it beneficial.     The lidocaine or marcaine (commonly called Novocain) is an anesthetic agent that is injected with the steroid will typically relieve your pain for a few hours following the injection. If the  Novocain  and steroid are injected near a nerve, you may experience local numbness or weakness from the nerve block until it wears off. After this wears off your pain may return until the steroid takes effect.   The steroid may be effective immediately after the injection. Do not be concerned if the injection is not effective in relieving your symptoms immediately.  In some cases, it may take up to two weeks for the steroid to work.   If you are diabetic, the corticosteroid may cause your blood sugar to become elevated for several days following the injection. This response usually lasts about 2-4 days before it returns to your normal level.   You should report any adverse reaction to you doctor. Call if there are any questions.

## 2024-04-03 NOTE — PROGRESS NOTES
"Margot returns to the office for reevaluation of the right and left foot.  The patient relates following the instructions given at the last visit with noted overall more pain and less improvement in function of the right and left foot.   The patient relates no other problems.    Vitals: /79   Pulse 79   Ht 1.651 m (5' 5\")   Wt 83.9 kg (185 lb)   BMI 30.79 kg/m    BMI= Body mass index is 30.79 kg/m .    Lower Extremity Physical Exam:      Neurovascular status remains unchanged.  Muscular exam is within normal limits to major muscle groups.  Integument is intact.      Noted pain on palpation on the plantar aspect of the left and right heel near the insertion point of the plantar fascia.  No surrounding erythema or edema noted.  Noted tight gastroc complex bilaterally.       Assessment:     ICD-10-CM    1. Plantar fasciitis, right  M72.2 Physical Therapy  Referral     lidocaine 1 % injection 1 mL     triamcinolone (KENALOG-40) injection 40 mg     triamcinolone (KENALOG-40) injection 40 mg     lidocaine 1 % injection 1 mL     INJECTION SINGLE TENDON SHEATH/LIGAMENT      2. Plantar fasciitis, left  M72.2 Physical Therapy  Referral     lidocaine 1 % injection 1 mL     triamcinolone (KENALOG-40) injection 40 mg     triamcinolone (KENALOG-40) injection 40 mg     lidocaine 1 % injection 1 mL     INJECTION SINGLE TENDON SHEATH/LIGAMENT          Plan:    I have explained to Margot about the progress of the conditions.  At this time, the patient was educated on the importance of offloading supportive shoes and other devices.  I demonstrated to the patient calf stretches to perform every hour daily until symptoms resolve.  After symptoms resolve, the patient was advised to perform the stretches 3 times daily to prevent future recurrence.  The patient was instructed to perform warm soaks with Epson salt after which to also apply over-the-counter Voltaren gel to deeply massage the injured tissue.  The patient " was instructed to do this on a daily basis until symptoms resolve.   The patient was fitted with a Dynaflex insert that will aid in offloading the tension forces to the soft tissues and prevent further inflammation.  To reduce the amount of current inflammation, the patient would like to try a steroid injection.  The medial aspect of the right and left heel was swabbed with alcohol.  Next, a mixture of 5mg of Kenalog 10, and 2 cc of 1% lidocaine plain was injected around the medial tubercle of the calcaneal in and around the insertion of the plantar fascia to the right and left foot.  The patient tolerated the procedure well.  A Band-Aid was applied to the injection site.  The patient will follow up in four weeks.  The patient may return in four weeks for reevaluation to determine if any further treatment will be needed.      Margot verbalized agreement with and understanding of the rational for the diagnosis and treatment plan.  All questions were answered to best of my ability and the patient's satisfaction. The patient was advised to contact the clinic with any questions that may arise after the clinic visit.      Disclaimer: This note consists of symbols derived from keyboarding, dictation and/or voice recognition software. As a result, there may be errors in the script that have gone undetected. Please consider this when interpreting information found in this chart.       SUZANNE Morris D.P.M., F.MEHUL.C.F.A.S.

## 2024-04-03 NOTE — NURSING NOTE
"Chief Complaint   Patient presents with    RECHECK     Discuss bilateral injection       Initial /79   Pulse 79   Ht 1.651 m (5' 5\")   Wt 83.9 kg (185 lb)   BMI 30.79 kg/m   Estimated body mass index is 30.79 kg/m  as calculated from the following:    Height as of this encounter: 1.651 m (5' 5\").    Weight as of this encounter: 83.9 kg (185 lb).  Medications and allergies reviewed.      Emilie FRANCOIS MA    "

## 2024-05-14 ENCOUNTER — E-VISIT (OUTPATIENT)
Dept: FAMILY MEDICINE | Facility: CLINIC | Age: 59
End: 2024-05-14
Payer: COMMERCIAL

## 2024-05-14 DIAGNOSIS — R73.03 PREDIABETES: Primary | ICD-10-CM

## 2024-05-14 DIAGNOSIS — E11.9 TYPE 2 DIABETES MELLITUS WITHOUT COMPLICATION, WITHOUT LONG-TERM CURRENT USE OF INSULIN (H): ICD-10-CM

## 2024-05-14 PROCEDURE — 99207 PR NON-BILLABLE SERV PER CHARTING: CPT | Performed by: FAMILY MEDICINE

## 2024-05-15 RX ORDER — LANCETS
EACH MISCELLANEOUS
Qty: 100 EACH | Refills: 6 | Status: SHIPPED | OUTPATIENT
Start: 2024-05-15

## 2024-05-17 ENCOUNTER — ANCILLARY PROCEDURE (OUTPATIENT)
Dept: GENERAL RADIOLOGY | Facility: CLINIC | Age: 59
End: 2024-05-17
Attending: FAMILY MEDICINE
Payer: COMMERCIAL

## 2024-05-17 ENCOUNTER — VIRTUAL VISIT (OUTPATIENT)
Dept: FAMILY MEDICINE | Facility: CLINIC | Age: 59
End: 2024-05-17
Payer: COMMERCIAL

## 2024-05-17 ENCOUNTER — OFFICE VISIT (OUTPATIENT)
Dept: ORTHOPEDICS | Facility: CLINIC | Age: 59
End: 2024-05-17
Payer: COMMERCIAL

## 2024-05-17 VITALS — BODY MASS INDEX: 30.82 KG/M2 | HEIGHT: 65 IN | WEIGHT: 185 LBS

## 2024-05-17 DIAGNOSIS — M75.82 ROTATOR CUFF TENDINITIS, LEFT: ICD-10-CM

## 2024-05-17 DIAGNOSIS — G89.29 CHRONIC LEFT SHOULDER PAIN: ICD-10-CM

## 2024-05-17 DIAGNOSIS — E11.9 TYPE 2 DIABETES MELLITUS WITHOUT COMPLICATION, WITHOUT LONG-TERM CURRENT USE OF INSULIN (H): Primary | ICD-10-CM

## 2024-05-17 DIAGNOSIS — M25.512 CHRONIC LEFT SHOULDER PAIN: ICD-10-CM

## 2024-05-17 DIAGNOSIS — G89.29 CHRONIC LEFT SHOULDER PAIN: Primary | ICD-10-CM

## 2024-05-17 DIAGNOSIS — M19.012 OSTEOARTHRITIS OF GLENOHUMERAL JOINT, LEFT: ICD-10-CM

## 2024-05-17 DIAGNOSIS — M25.512 CHRONIC LEFT SHOULDER PAIN: Primary | ICD-10-CM

## 2024-05-17 DIAGNOSIS — M25.612 SHOULDER STIFFNESS, LEFT: ICD-10-CM

## 2024-05-17 DIAGNOSIS — Z98.890 HX OF SHOULDER SURGERY: ICD-10-CM

## 2024-05-17 PROCEDURE — 73030 X-RAY EXAM OF SHOULDER: CPT | Mod: TC | Performed by: RADIOLOGY

## 2024-05-17 PROCEDURE — G2211 COMPLEX E/M VISIT ADD ON: HCPCS | Mod: 95 | Performed by: FAMILY MEDICINE

## 2024-05-17 PROCEDURE — 99214 OFFICE O/P EST MOD 30 MIN: CPT | Mod: 25 | Performed by: FAMILY MEDICINE

## 2024-05-17 PROCEDURE — 99214 OFFICE O/P EST MOD 30 MIN: CPT | Mod: 95 | Performed by: FAMILY MEDICINE

## 2024-05-17 PROCEDURE — 20611 DRAIN/INJ JOINT/BURSA W/US: CPT | Mod: LT | Performed by: FAMILY MEDICINE

## 2024-05-17 RX ORDER — TIRZEPATIDE 5 MG/.5ML
5 INJECTION, SOLUTION SUBCUTANEOUS
Qty: 2 ML | Refills: 0 | Status: SHIPPED | OUTPATIENT
Start: 2024-06-14 | End: 2024-06-21

## 2024-05-17 RX ORDER — ROPIVACAINE HYDROCHLORIDE 5 MG/ML
3 INJECTION, SOLUTION EPIDURAL; INFILTRATION; PERINEURAL
Status: DISCONTINUED | OUTPATIENT
Start: 2024-05-17 | End: 2024-09-18

## 2024-05-17 RX ORDER — TIRZEPATIDE 7.5 MG/.5ML
7.5 INJECTION, SOLUTION SUBCUTANEOUS
Qty: 6 ML | Refills: 3 | Status: SHIPPED | OUTPATIENT
Start: 2024-07-12 | End: 2024-10-03

## 2024-05-17 RX ORDER — ACYCLOVIR 400 MG/1
1 TABLET ORAL ONCE
Qty: 1 EACH | Refills: 0 | Status: SHIPPED | OUTPATIENT
Start: 2024-05-17 | End: 2024-05-17

## 2024-05-17 RX ORDER — METFORMIN HCL 500 MG
500 TABLET, EXTENDED RELEASE 24 HR ORAL
Qty: 90 TABLET | Refills: 3 | Status: SHIPPED | OUTPATIENT
Start: 2024-05-17 | End: 2024-06-21 | Stop reason: ALTCHOICE

## 2024-05-17 RX ORDER — PROPRANOLOL HYDROCHLORIDE 80 MG/1
CAPSULE, EXTENDED RELEASE ORAL
COMMUNITY
Start: 2023-12-11 | End: 2024-06-21 | Stop reason: ALTCHOICE

## 2024-05-17 RX ORDER — TRIAMCINOLONE ACETONIDE 40 MG/ML
40 INJECTION, SUSPENSION INTRA-ARTICULAR; INTRAMUSCULAR
Status: DISCONTINUED | OUTPATIENT
Start: 2024-05-17 | End: 2024-09-18

## 2024-05-17 RX ORDER — TIRZEPATIDE 2.5 MG/.5ML
2.5 INJECTION, SOLUTION SUBCUTANEOUS
Qty: 2 ML | Refills: 0 | Status: SHIPPED | OUTPATIENT
Start: 2024-05-17 | End: 2024-06-14

## 2024-05-17 RX ORDER — DIAZEPAM 5 MG
TABLET ORAL
COMMUNITY
Start: 2023-12-11 | End: 2024-06-21 | Stop reason: ALTCHOICE

## 2024-05-17 RX ORDER — ACYCLOVIR 400 MG/1
1 TABLET ORAL
Qty: 9 EACH | Refills: 5 | Status: SHIPPED | OUTPATIENT
Start: 2024-05-17

## 2024-05-17 RX ADMIN — TRIAMCINOLONE ACETONIDE 40 MG: 40 INJECTION, SUSPENSION INTRA-ARTICULAR; INTRAMUSCULAR at 08:49

## 2024-05-17 RX ADMIN — ROPIVACAINE HYDROCHLORIDE 3 ML: 5 INJECTION, SOLUTION EPIDURAL; INFILTRATION; PERINEURAL at 08:49

## 2024-05-17 NOTE — PROGRESS NOTES
"Margot is a 58 year old who is being evaluated via a billable video visit.    How would you like to obtain your AVS? MyChart  If the video visit is dropped, the invitation should be resent by: Text to cell phone: [phone]  Will anyone else be joining your video visit? No      Assessment & Plan     Type 2 diabetes mellitus without complication, without long-term current use of insulin (H)  New diagnosis - uncontrolled with hyperglycemia   Based on BS over 200 at home   Will get labs and start meds   - REVIEW OF HEALTH MAINTENANCE PROTOCOL ORDERS  - metFORMIN (GLUCOPHAGE XR) 500 MG 24 hr tablet; Take 1 tablet (500 mg) by mouth daily (with dinner)  - CBC with platelets; Future  - Comprehensive metabolic panel; Future  - Hemoglobin A1c; Future  - Lipid panel reflex to direct LDL Fasting; Future  - TSH with free T4 reflex; Future  - Albumin Random Urine Quantitative with Creat Ratio; Future  - Vitamin B12; Future  - tirzepatide (MOUNJARO) 2.5 MG/0.5ML pen; Inject 2.5 mg Subcutaneous every 7 days for 28 days  - tirzepatide (MOUNJARO) 5 MG/0.5ML pen; Inject 5 mg Subcutaneous every 7 days for 28 days  - tirzepatide (MOUNJARO) 7.5 MG/0.5ML pen; Inject 7.5 mg Subcutaneous every 7 days  - Adult Diabetes Education  Referral; Future      The longitudinal plan of care for the diagnosis(es)/condition(s) as documented were addressed during this visit. Due to the added complexity in care, I will continue to support Margot in the subsequent management and with ongoing continuity of care.      BMI  Estimated body mass index is 30.79 kg/m  as calculated from the following:    Height as of 4/3/24: 1.651 m (5' 5\").    Weight as of 4/3/24: 83.9 kg (185 lb).   Weight management plan: Discussed healthy diet and exercise guidelines          Subjective   Margot is a 58 year old, presenting for the following health issues:  Blood Sugar Problem    History of Present Illness       Reason for visit:  High glucose readings  Symptom onset:  1-3 " days ago  Symptoms include:  High glocose readings  Symptom intensity:  Mild  Symptom progression:  Staying the same  Had these symptoms before:  No    She eats 4 or more servings of fruits and vegetables daily.She consumes 0 sweetened beverage(s) daily.She exercises with enough effort to increase her heart rate 10 to 19 minutes per day.  She exercises with enough effort to increase her heart rate 7 days per week.   She is taking medications regularly.       Diabetes New diagnosis     How often are you checking your blood sugar? One day  What concerns do you have today about your diabetes? Blood sugar is often over 200 and Other: weight gain   Do you have any of these symptoms? (Select all that apply)  Excessive thirst and Weight gain      BP Readings from Last 2 Encounters:   04/03/24 128/79   11/13/23 137/79     LDL Cholesterol Calculated (mg/dL)   Date Value   06/07/2023 135 (H)   06/10/2022 109 (H)   05/12/2021 132 (H)   11/28/2016 132 (H)             Review of Systems  Constitutional, HEENT, cardiovascular, pulmonary, gi and gu systems are negative, except as otherwise noted.      Objective    Vitals - Patient Reported  Weight (Patient Reported): 84.5 kg (186 lb 3.2 oz)      Vitals:  No vitals were obtained today due to virtual visit.    Physical Exam   GENERAL: alert and no distress  EYES: Eyes grossly normal to inspection.  No discharge or erythema, or obvious scleral/conjunctival abnormalities.  RESP: No audible wheeze, cough, or visible cyanosis.    SKIN: Visible skin clear. No significant rash, abnormal pigmentation or lesions.  NEURO: Cranial nerves grossly intact.  Mentation and speech appropriate for age.  PSYCH: Appropriate affect, tone, and pace of words          Video-Visit Details    Type of service:  Video Visit   Originating Location (pt. Location): Home    Distant Location (provider location):  On-site  Platform used for Video Visit: Tuan  Signed Electronically by: Lakeisha Haley MD

## 2024-05-17 NOTE — LETTER
2024         RE: Margot Kendrick  [address] [facility] [address]        Dear Colleague,    Thank you for referring your patient, Margot Kendrick, to the [facility] [place]. Please see a copy of my visit note below.    Margot Kendrick  :  1965  DOS: 2024  MRN: [id number]    Sports Medicine Clinic Visit    PCP: Lakeisha Haley    Margot Kendrick is a 58 year old Right hand dominant female who is seen as a self referral presenting with left shoulder pain.    Injury: Gradual onset of pain over the past 6 month(s).  Pain located over left shoulder, nonradiating.  Reports constant diffuse pain around the shoulder. No additional sx.  Symptoms are better with Home Exercises, arthritic medicines reported by patient (mobic).  Symptoms are worse with: extension, flexion, and reaching.  Other evaluation and/or treatments so far consists of: Nothing, notes restriction to Tylenol, no other medicines due to medical concerns.  Recent imaging completed: No recent imaging completed.  Prior History of related problems: Clavicle resection of both sides in  or .    Social History:      Review of Systems  Musculoskeletal: as above  Remainder of review of systems is negative including constitutional, CV, pulmonary, GI, Skin and Neurologic except as noted in HPI or medical history.    Past Medical History:   Diagnosis Date     Arthritis      Esophageal reflux     GERD     Migraine, unspecified, without mention of intractable migraine without mention of status migrainosus      Mitral valve disorders(424.0)     thought possible MVP, not documented     Myalgia and myositis, unspecified     Fibromyalgia     Type 2 diabetes mellitus without complication, without long-term current use of insulin (H) 2024     Past Surgical History:   Procedure Laterality Date     ABDOMEN SURGERY  2005    Right  Sanjiv-Collectomy     APPENDECTOMY  2005    With collectomy     COLONOSCOPY       COLONOSCOPY  2012    Procedure: COLONOSCOPY;  Colonoscopy;  Surgeon: Jun Reddy MD;  Location: [facility]     COLONOSCOPY N/A 02/15/2016    Procedure: COLONOSCOPY;  Surgeon: Anson Sethi MD;  Location: [facility]     ENT SURGERY       ENT SURGERY  2000    Sinus     ESOPHAGOSCOPY, GASTROSCOPY, DUODENOSCOPY (EGD), COMBINED N/A 10/08/2015    Procedure: COMBINED ESOPHAGOSCOPY, GASTROSCOPY, DUODENOSCOPY (EGD), BIOPSY SINGLE OR MULTIPLE;  Surgeon: Anson Sethi MD;  Location: [facility]     EYE SURGERY  2015    Recurrent corneal erosion     HYSTERECTOMY, VAGINAL  2002     LAPAROSCOPIC CHOLECYSTECTOMY N/A 10/27/2015    Procedure: LAPAROSCOPIC CHOLECYSTECTOMY;  Surgeon: Anson Sethi MD;  Location: [facility]     ORTHOPEDIC SURGERY       ORTHOPEDIC SURGERY      Left shoulder/Right Shoulder/Right Wrist x 3     SURGICAL HISTORY OF -   ,,         SURGICAL HISTORY OF -   1993    Excision (L) ganglion cyst     SURGICAL HISTORY OF -   1993    Tubal ligation     SURGICAL HISTORY OF -   1998    Septoplasty/sinus surgery     SURGICAL HISTORY OF -   1999    TVH     SURGICAL HISTORY OF -   2004/ /15/2005    Colonoscopy     SURGICAL HISTORY OF -   2001    Gastroscopy     SURGICAL HISTORY OF -   1998    Septoplasty     SURGICAL HISTORY OF -   2005    rt sanjiv colectomy     SURGICAL HISTORY OF -   2005    Open distal clavicle resection with subacromial decompression     SURGICAL HISTORY OF -   2008    rt wrist TFCC     SURGICAL HISTORY OF -   2008    rt wrist removal of scar tissue     SURGICAL HISTORY OF -   2009    Arthroscopic subacromial decompression with introduction of On-Q pain pump.      Family History   Problem Relation Age of Onset     C.A.D. Maternal Grandmother      Arthritis Maternal Grandmother         rheumatoid  "arthritis     Diabetes Maternal Grandmother      Cardiovascular Maternal Grandfather      Coronary Artery Disease Maternal Grandfather      Cardiovascular Paternal Grandmother      Cardiovascular Paternal Grandfather      Respiratory Daughter         ASTHMA     Neurologic Disorder Father         FIBROMYALGIA     Heart Disease Father 68     C.A.D. Father 68        MI     Osteoporosis Father      Neurologic Disorder Brother         FIBROMYALGIA     Neurologic Disorder Sister         FIBROMYALGIA     Anxiety Disorder Sister      Thyroid Disease Mother      Hypertension Mother      Hyperlipidemia Mother      Arthritis Maternal Aunt         psoriatic     Diabetes Other         Maternal Aunt     Coronary Artery Disease Other         Maternal Aunt     Hypertension Other         Maternal Aunt     Hyperlipidemia Other         Maternal Aunt     Melanoma No family hx of        Objective  Ht 1.651 m (5' 5\")   Wt 83.9 kg (185 lb)   BMI 30.79 kg/m      General: healthy, alert and in no distress    HEENT: no scleral icterus or conjunctival erythema   Skin: no suspicious lesions or rash. No jaundice.   CV: regular rhythm by palpation, 2+ distal pulses, no pedal edema    Resp: normal respiratory effort without conversational dyspnea   Psych: normal mood and affect    Gait: nonantalgic, appropriate coordination and balance   Neuro: normal light touch sensory exam of the extremities. Motor strength as noted below     Left Shoulder exam    ROM:        Mildly decreased terminal active and passive ROM with flexion, extension, abduction, internal and external rotation, also midrange pain with abduction and flexion actively    Tender:        posterior shoulder       Anterior GH joint    Non Tender:       remainder of shoulder       sternoclavicular joint       acromioclavicular joint       periscapular region    Strength:        abduction 4/5       internal rotation 5/5       external rotation 4/5       adduction 5/5    Impingement " testing:        neg (-) Neer       Mildly painful Mata       neg (-) empty can       neg (-) crossover       positive (+) crank    Stability testing:       neg (-) anterior glide       neg (-) sulcus sign    Skin:       no visible deformities       well perfused       capillary refill brisk    Sensation:        normal sensation over shoulder and upper extremity     Radiology  Recent Results (from the past 744 hour(s))   XR Shoulder Left G/E 3 Views    Narrative    XR SHOULDER LEFT G/E 3 VIEWS   5/17/2024 8:11 AM     HISTORY: Chronic left shoulder pain; Chronic left shoulder pain  COMPARISON: None.       Impression    IMPRESSION:     Views of the left shoulder are negative for acute fracture or  dislocation. No definite acromioclavicular glenohumeral joint space  narrowing. There is diffuse osseous demineralization.    ALTON PLEITEZ MD         SYSTEM ID:  [id number]       Assessment:  1. Chronic left shoulder pain    2. Osteoarthritis of glenohumeral joint, left    3. Rotator cuff tendinitis, left    4. Hx of shoulder surgery    5. Shoulder stiffness, left        Plan:  Discussed the assessment with the patient.  Follow up: prn based on short term clinical progress  GH joint >> RTC pain on exam today, mild underlying DJD noted on XR, inferior glenoid osteophyte  PT recommended and available anytime, declined for now, reviewed basic HEP  Could consider advanced imaging based on short term progress, defer for now, low suspicion for full-thickness RTC tear  XR images independently visualized and reviewed with patient today in clinic  Oral Tylenol and topical Voltaren gel reviewed as safe OTC options, reviewed safe dosing strategies  Expectations and goals of CSI reviewed  Often 2-3 days for steroid effect, and can take up to two weeks for maximum effect  We discussed modified progressive pain-free activity as tolerated  Do not overuse in first two weeks if feeling better due to concern for vulnerability while  steroid is working  Supportive care reviewed  All questions were answered today  Contact us with additional questions or concerns  Signs and sx of concern reviewed      Jorge Lacy DO, CAQ  Sports Medicine Physician  [facility]          Disclaimer: This note consists of symbols derived from keyboarding, dictation and/or voice recognition software. As a result, there may be errors in the script that have gone undetected. Please consider this when interpreting information found in this chart.    Large Joint Injection/Arthocentesis: L glenohumeral joint    Date/Time: 5/17/2024 8:49 AM    Performed by: Jorge Lacy DO  Authorized by: Jorge Lacy DO    Indications:  Pain  Needle Size:  21 G  Guidance: ultrasound    Approach:  Posterolateral  Location:  Shoulder      Site:  L glenohumeral joint  Medications:  40 mg triamcinolone 40 MG/ML; 3 mL ROPivacaine 5 MG/ML  Outcome:  Tolerated well, no immediate complications  Procedure discussed: discussed risks, benefits, and alternatives    Consent Given by:  Patient  Timeout: timeout called immediately prior to procedure    Prep: patient was prepped and draped in usual sterile fashion     Ultrasound images of procedure were permanently stored.             Again, thank you for allowing me to participate in the care of your patient.        Sincerely,        Jorge Lacy DO

## 2024-05-17 NOTE — PROGRESS NOTES
Margot Kendrick  :  1965  DOS: 2024  MRN: [id number]    Sports Medicine Clinic Visit    PCP: Lakeisha Haley    Margot Kendrick is a 58 year old Right hand dominant female who is seen as a self referral presenting with left shoulder pain.    Injury: Gradual onset of pain over the past 6 month(s).  Pain located over left shoulder, nonradiating.  Reports constant diffuse pain around the shoulder. No additional sx.  Symptoms are better with Home Exercises, arthritic medicines reported by patient (mobic).  Symptoms are worse with: extension, flexion, and reaching.  Other evaluation and/or treatments so far consists of: Nothing, notes restriction to Tylenol, no other medicines due to medical concerns.  Recent imaging completed: No recent imaging completed.  Prior History of related problems: Clavicle resection of both sides in  or .    Social History:      Review of Systems  Musculoskeletal: as above  Remainder of review of systems is negative including constitutional, CV, pulmonary, GI, Skin and Neurologic except as noted in HPI or medical history.    Past Medical History:   Diagnosis Date    Arthritis     Esophageal reflux     GERD    Migraine, unspecified, without mention of intractable migraine without mention of status migrainosus     Mitral valve disorders(424.0)     thought possible MVP, not documented    Myalgia and myositis, unspecified     Fibromyalgia    Type 2 diabetes mellitus without complication, without long-term current use of insulin (H) 2024     Past Surgical History:   Procedure Laterality Date    ABDOMEN SURGERY  2005    Right Sanjiv-Collectomy    APPENDECTOMY  2005    With collectomy    COLONOSCOPY      COLONOSCOPY  2012    Procedure: COLONOSCOPY;  Colonoscopy;  Surgeon: Jun Reddy MD;  Location: [facility]    COLONOSCOPY N/A 02/15/2016    Procedure: COLONOSCOPY;  Surgeon: Anson Sethi MD;  Location: [facility]     ENT SURGERY      ENT SURGERY  2000    Sinus    ESOPHAGOSCOPY, GASTROSCOPY, DUODENOSCOPY (EGD), COMBINED N/A 10/08/2015    Procedure: COMBINED ESOPHAGOSCOPY, GASTROSCOPY, DUODENOSCOPY (EGD), BIOPSY SINGLE OR MULTIPLE;  Surgeon: Anson Sethi MD;  Location: [facility]    EYE SURGERY  2015    Recurrent corneal erosion    HYSTERECTOMY, VAGINAL  2002    LAPAROSCOPIC CHOLECYSTECTOMY N/A 10/27/2015    Procedure: LAPAROSCOPIC CHOLECYSTECTOMY;  Surgeon: Anson Sethi MD;  Location: [facility]    ORTHOPEDIC SURGERY      ORTHOPEDIC SURGERY      Left shoulder/Right Shoulder/Right Wrist x 3    SURGICAL HISTORY OF -   ,,        SURGICAL HISTORY OF -   1993    Excision (L) ganglion cyst    SURGICAL HISTORY OF -   1993    Tubal ligation    SURGICAL HISTORY OF -   1998    Septoplasty/sinus surgery    SURGICAL HISTORY OF -   1999    TVH    SURGICAL HISTORY OF -   2004/ /15/2005    Colonoscopy    SURGICAL HISTORY OF -   2001    Gastroscopy    SURGICAL HISTORY OF -   1998    Septoplasty    SURGICAL HISTORY OF -   2005    rt craig colectomy    SURGICAL HISTORY OF -   2005    Open distal clavicle resection with subacromial decompression    SURGICAL HISTORY OF -   2008    rt wrist TFCC    SURGICAL HISTORY OF -   2008    rt wrist removal of scar tissue    SURGICAL HISTORY OF -   2009    Arthroscopic subacromial decompression with introduction of On-Q pain pump.      Family History   Problem Relation Age of Onset    C.A.D. Maternal Grandmother     Arthritis Maternal Grandmother         rheumatoid arthritis    Diabetes Maternal Grandmother     Cardiovascular Maternal Grandfather     Coronary Artery Disease Maternal Grandfather     Cardiovascular Paternal Grandmother     Cardiovascular Paternal Grandfather     Respiratory Daughter         ASTHMA    Neurologic Disorder Father         FIBROMYALGIA    Heart Disease Father 68  "OBDULIO Father 68        MI    Osteoporosis Father     Neurologic Disorder Brother         FIBROMYALGIA    Neurologic Disorder Sister         FIBROMYALGIA    Anxiety Disorder Sister     Thyroid Disease Mother     Hypertension Mother     Hyperlipidemia Mother     Arthritis Maternal Aunt         psoriatic    Diabetes Other         Maternal Aunt    Coronary Artery Disease Other         Maternal Aunt    Hypertension Other         Maternal Aunt    Hyperlipidemia Other         Maternal Aunt    Melanoma No family hx of        Objective  Ht 1.651 m (5' 5\")   Wt 83.9 kg (185 lb)   BMI 30.79 kg/m      General: healthy, alert and in no distress    HEENT: no scleral icterus or conjunctival erythema   Skin: no suspicious lesions or rash. No jaundice.   CV: regular rhythm by palpation, 2+ distal pulses, no pedal edema    Resp: normal respiratory effort without conversational dyspnea   Psych: normal mood and affect    Gait: nonantalgic, appropriate coordination and balance   Neuro: normal light touch sensory exam of the extremities. Motor strength as noted below     Left Shoulder exam    ROM:        Mildly decreased terminal active and passive ROM with flexion, extension, abduction, internal and external rotation, also midrange pain with abduction and flexion actively    Tender:        posterior shoulder       Anterior GH joint    Non Tender:       remainder of shoulder       sternoclavicular joint       acromioclavicular joint       periscapular region    Strength:        abduction 4/5       internal rotation 5/5       external rotation 4/5       adduction 5/5    Impingement testing:        neg (-) Neer       Mildly painful Mata       neg (-) empty can       neg (-) crossover       positive (+) crank    Stability testing:       neg (-) anterior glide       neg (-) sulcus sign    Skin:       no visible deformities       well perfused       capillary refill brisk    Sensation:        normal sensation over shoulder and upper " extremity     Radiology  Recent Results (from the past 744 hour(s))   XR Shoulder Left G/E 3 Views    Narrative    XR SHOULDER LEFT G/E 3 VIEWS   5/17/2024 8:11 AM     HISTORY: Chronic left shoulder pain; Chronic left shoulder pain  COMPARISON: None.       Impression    IMPRESSION:     Views of the left shoulder are negative for acute fracture or  dislocation. No definite acromioclavicular glenohumeral joint space  narrowing. There is diffuse osseous demineralization.    ALTON PLEITEZ MD         SYSTEM ID:  [id number]       Assessment:  1. Chronic left shoulder pain    2. Osteoarthritis of glenohumeral joint, left    3. Rotator cuff tendinitis, left    4. Hx of shoulder surgery    5. Shoulder stiffness, left        Plan:  Discussed the assessment with the patient.  Follow up: prn based on short term clinical progress  GH joint >> RTC pain on exam today, mild underlying DJD noted on XR, inferior glenoid osteophyte  PT recommended and available anytime, declined for now, reviewed basic HEP  Could consider advanced imaging based on short term progress, defer for now, low suspicion for full-thickness RTC tear  XR images independently visualized and reviewed with patient today in clinic  Oral Tylenol and topical Voltaren gel reviewed as safe OTC options, reviewed safe dosing strategies  Expectations and goals of CSI reviewed  Often 2-3 days for steroid effect, and can take up to two weeks for maximum effect  We discussed modified progressive pain-free activity as tolerated  Do not overuse in first two weeks if feeling better due to concern for vulnerability while steroid is working  Supportive care reviewed  All questions were answered today  Contact us with additional questions or concerns  Signs and sx of concern reviewed      Jorge Lacy DO, AMBREEN  Sports Medicine Physician  [facility]          Disclaimer: This note consists of symbols derived from keyboarding, dictation and/or  voice recognition software. As a result, there may be errors in the script that have gone undetected. Please consider this when interpreting information found in this chart.    Large Joint Injection/Arthocentesis: L glenohumeral joint    Date/Time: 5/17/2024 8:49 AM    Performed by: Jorge Lacy DO  Authorized by: Jorge Lacy DO    Indications:  Pain  Needle Size:  21 G  Guidance: ultrasound    Approach:  Posterolateral  Location:  Shoulder      Site:  L glenohumeral joint  Medications:  40 mg triamcinolone 40 MG/ML; 3 mL ROPivacaine 5 MG/ML  Outcome:  Tolerated well, no immediate complications  Procedure discussed: discussed risks, benefits, and alternatives    Consent Given by:  Patient  Timeout: timeout called immediately prior to procedure    Prep: patient was prepped and draped in usual sterile fashion     Ultrasound images of procedure were permanently stored.

## 2024-05-18 ENCOUNTER — TELEPHONE (OUTPATIENT)
Dept: FAMILY MEDICINE | Facility: CLINIC | Age: 59
End: 2024-05-18
Payer: COMMERCIAL

## 2024-05-18 NOTE — TELEPHONE ENCOUNTER
Prior Authorization Retail Medication Request    Medication/Dose: Mounjaro 2.5mg   Diagnosis and ICD code (if different than what is on RX):    New/renewal/insurance change PA/secondary ins. PA:  Previously Tried and Failed:    Rationale:  IF USED FOR TYPE 2 DIABETES, CALL [phone] FOR PRIOR AUTHORIZATION. WEIGHT L  OSS USE IS NOT COVERED.      Insurance   Primary:  commercial   Insurance ID:  [id number]    Secondary (if applicable):  Insurance ID:      Pharmacy Information (if different than what is on RX)  Name:    Phone:    Fax:

## 2024-05-20 ENCOUNTER — LAB (OUTPATIENT)
Dept: LAB | Facility: CLINIC | Age: 59
End: 2024-05-20
Payer: COMMERCIAL

## 2024-05-20 DIAGNOSIS — E11.9 TYPE 2 DIABETES MELLITUS WITHOUT COMPLICATION, WITHOUT LONG-TERM CURRENT USE OF INSULIN (H): ICD-10-CM

## 2024-05-20 LAB
ALBUMIN SERPL BCG-MCNC: 4.4 G/DL (ref 3.5–5.2)
ALP SERPL-CCNC: 65 U/L (ref 40–150)
ALT SERPL W P-5'-P-CCNC: 145 U/L (ref 0–50)
ANION GAP SERPL CALCULATED.3IONS-SCNC: 15 MMOL/L (ref 7–15)
AST SERPL W P-5'-P-CCNC: 102 U/L (ref 0–45)
BILIRUB SERPL-MCNC: 0.6 MG/DL
BUN SERPL-MCNC: 26.7 MG/DL (ref 6–20)
CALCIUM SERPL-MCNC: 10.7 MG/DL (ref 8.6–10)
CHLORIDE SERPL-SCNC: 100 MMOL/L (ref 98–107)
CHOLEST SERPL-MCNC: 210 MG/DL
CREAT SERPL-MCNC: 0.75 MG/DL (ref 0.51–0.95)
CREAT UR-MCNC: 150.7 MG/DL
DEPRECATED HCO3 PLAS-SCNC: 22 MMOL/L (ref 22–29)
EGFRCR SERPLBLD CKD-EPI 2021: >90 ML/MIN/1.73M2
ERYTHROCYTE [DISTWIDTH] IN BLOOD BY AUTOMATED COUNT: 12.7 % (ref 10–15)
FASTING STATUS PATIENT QL REPORTED: NO
FASTING STATUS PATIENT QL REPORTED: NO
GLUCOSE SERPL-MCNC: 160 MG/DL (ref 70–99)
HBA1C MFR BLD: 7.8 % (ref 0–5.6)
HCT VFR BLD AUTO: 44.7 % (ref 35–47)
HDLC SERPL-MCNC: 38 MG/DL
HGB BLD-MCNC: 14.7 G/DL (ref 11.7–15.7)
LDLC SERPL CALC-MCNC: 135 MG/DL
MCH RBC QN AUTO: 30.8 PG (ref 26.5–33)
MCHC RBC AUTO-ENTMCNC: 32.9 G/DL (ref 31.5–36.5)
MCV RBC AUTO: 94 FL (ref 78–100)
MICROALBUMIN UR-MCNC: <12 MG/L
MICROALBUMIN/CREAT UR: NORMAL MG/G{CREAT}
NONHDLC SERPL-MCNC: 172 MG/DL
PLATELET # BLD AUTO: 347 10E3/UL (ref 150–450)
POTASSIUM SERPL-SCNC: 4.3 MMOL/L (ref 3.4–5.3)
PROT SERPL-MCNC: 7.6 G/DL (ref 6.4–8.3)
RBC # BLD AUTO: 4.78 10E6/UL (ref 3.8–5.2)
SODIUM SERPL-SCNC: 137 MMOL/L (ref 135–145)
TRIGL SERPL-MCNC: 185 MG/DL
TSH SERPL DL<=0.005 MIU/L-ACNC: 1.5 UIU/ML (ref 0.3–4.2)
WBC # BLD AUTO: 10.9 10E3/UL (ref 4–11)

## 2024-05-20 PROCEDURE — 82607 VITAMIN B-12: CPT

## 2024-05-20 PROCEDURE — 84443 ASSAY THYROID STIM HORMONE: CPT

## 2024-05-20 PROCEDURE — 83036 HEMOGLOBIN GLYCOSYLATED A1C: CPT

## 2024-05-20 PROCEDURE — 80061 LIPID PANEL: CPT

## 2024-05-20 PROCEDURE — 80053 COMPREHEN METABOLIC PANEL: CPT

## 2024-05-20 PROCEDURE — 36415 COLL VENOUS BLD VENIPUNCTURE: CPT

## 2024-05-20 PROCEDURE — 82043 UR ALBUMIN QUANTITATIVE: CPT

## 2024-05-20 PROCEDURE — 85027 COMPLETE CBC AUTOMATED: CPT

## 2024-05-20 PROCEDURE — 82570 ASSAY OF URINE CREATININE: CPT

## 2024-05-21 ENCOUNTER — PATIENT OUTREACH (OUTPATIENT)
Dept: CARE COORDINATION | Facility: CLINIC | Age: 59
End: 2024-05-21
Payer: COMMERCIAL

## 2024-05-21 LAB — VIT B12 SERPL-MCNC: 690 PG/ML (ref 232–1245)

## 2024-05-24 RX ORDER — KETOROLAC TROMETHAMINE 30 MG/ML
1 INJECTION, SOLUTION INTRAMUSCULAR; INTRAVENOUS ONCE
Qty: 1 EACH | Refills: 0 | Status: SHIPPED | OUTPATIENT
Start: 2024-05-24 | End: 2024-05-24

## 2024-05-24 RX ORDER — BLOOD-GLUCOSE SENSOR
1 EACH MISCELLANEOUS
Qty: 6 EACH | Refills: 5 | Status: SHIPPED | OUTPATIENT
Start: 2024-05-24 | End: 2024-06-21 | Stop reason: ALTCHOICE

## 2024-05-25 NOTE — TELEPHONE ENCOUNTER
PA Initiation    Medication: MOUNJARO 2.5 MG/0.5ML SC SOPN  Insurance Company: [employer] - Phone [phone] Fax [phone]  Pharmacy Filling the Rx: [place], [place] - [address] [place] [address]  Filling Pharmacy Phone: [phone]  Filling Pharmacy Fax: [phone]  Start Date: 5/25/2024

## 2024-05-28 NOTE — TELEPHONE ENCOUNTER
Prior Authorization Not Needed per Insurance    Medication: MOUNJARO 2.5 MG/0.5ML SC SOPN  Insurance Company: [employer] - Phone [phone] Fax [phone]  Expected CoPay: $    Pharmacy Filling the Rx: [facility], [place] - [address] [place] [address]  Pharmacy Notified: YES  Patient Notified: **Instructed pharmacy to notify patient when script is ready to /ship.**

## 2024-06-17 SDOH — HEALTH STABILITY: PHYSICAL HEALTH: ON AVERAGE, HOW MANY MINUTES DO YOU ENGAGE IN EXERCISE AT THIS LEVEL?: 40 MIN

## 2024-06-17 SDOH — HEALTH STABILITY: PHYSICAL HEALTH: ON AVERAGE, HOW MANY DAYS PER WEEK DO YOU ENGAGE IN MODERATE TO STRENUOUS EXERCISE (LIKE A BRISK WALK)?: 6 DAYS

## 2024-06-17 ASSESSMENT — SOCIAL DETERMINANTS OF HEALTH (SDOH): HOW OFTEN DO YOU GET TOGETHER WITH FRIENDS OR RELATIVES?: TWICE A WEEK

## 2024-06-18 ENCOUNTER — PATIENT OUTREACH (OUTPATIENT)
Dept: CARE COORDINATION | Facility: CLINIC | Age: 59
End: 2024-06-18
Payer: COMMERCIAL

## 2024-06-21 ENCOUNTER — OFFICE VISIT (OUTPATIENT)
Dept: FAMILY MEDICINE | Facility: CLINIC | Age: 59
End: 2024-06-21
Payer: COMMERCIAL

## 2024-06-21 VITALS
BODY MASS INDEX: 29.82 KG/M2 | SYSTOLIC BLOOD PRESSURE: 110 MMHG | OXYGEN SATURATION: 97 % | DIASTOLIC BLOOD PRESSURE: 70 MMHG | HEIGHT: 65 IN | HEART RATE: 105 BPM | RESPIRATION RATE: 14 BRPM | WEIGHT: 179 LBS

## 2024-06-21 DIAGNOSIS — Z12.31 VISIT FOR SCREENING MAMMOGRAM: ICD-10-CM

## 2024-06-21 DIAGNOSIS — Z00.00 ROUTINE GENERAL MEDICAL EXAMINATION AT A HEALTH CARE FACILITY: Primary | ICD-10-CM

## 2024-06-21 DIAGNOSIS — F41.9 ANXIETY: ICD-10-CM

## 2024-06-21 DIAGNOSIS — G43.809 OTHER MIGRAINE WITHOUT STATUS MIGRAINOSUS, NOT INTRACTABLE: ICD-10-CM

## 2024-06-21 DIAGNOSIS — E11.9 TYPE 2 DIABETES MELLITUS WITHOUT COMPLICATION, WITHOUT LONG-TERM CURRENT USE OF INSULIN (H): ICD-10-CM

## 2024-06-21 LAB
ALBUMIN SERPL BCG-MCNC: 4.5 G/DL (ref 3.5–5.2)
ALP SERPL-CCNC: 51 U/L (ref 40–150)
ALT SERPL W P-5'-P-CCNC: 118 U/L (ref 0–50)
ANION GAP SERPL CALCULATED.3IONS-SCNC: 11 MMOL/L (ref 7–15)
AST SERPL W P-5'-P-CCNC: 76 U/L (ref 0–45)
BILIRUB SERPL-MCNC: 0.8 MG/DL
BUN SERPL-MCNC: 11.1 MG/DL (ref 6–20)
CALCIUM SERPL-MCNC: 9.8 MG/DL (ref 8.6–10)
CHLORIDE SERPL-SCNC: 104 MMOL/L (ref 98–107)
CREAT SERPL-MCNC: 0.73 MG/DL (ref 0.51–0.95)
DEPRECATED HCO3 PLAS-SCNC: 24 MMOL/L (ref 22–29)
EGFRCR SERPLBLD CKD-EPI 2021: >90 ML/MIN/1.73M2
GLUCOSE SERPL-MCNC: 103 MG/DL (ref 70–99)
HBA1C MFR BLD: 6.2 % (ref 0–5.6)
POTASSIUM SERPL-SCNC: 4.5 MMOL/L (ref 3.4–5.3)
PROT SERPL-MCNC: 7.2 G/DL (ref 6.4–8.3)
SODIUM SERPL-SCNC: 139 MMOL/L (ref 135–145)

## 2024-06-21 PROCEDURE — 90471 IMMUNIZATION ADMIN: CPT | Performed by: FAMILY MEDICINE

## 2024-06-21 PROCEDURE — 99396 PREV VISIT EST AGE 40-64: CPT | Mod: 25 | Performed by: FAMILY MEDICINE

## 2024-06-21 PROCEDURE — 90677 PCV20 VACCINE IM: CPT | Performed by: FAMILY MEDICINE

## 2024-06-21 PROCEDURE — 90472 IMMUNIZATION ADMIN EACH ADD: CPT | Performed by: FAMILY MEDICINE

## 2024-06-21 PROCEDURE — 83036 HEMOGLOBIN GLYCOSYLATED A1C: CPT | Performed by: FAMILY MEDICINE

## 2024-06-21 PROCEDURE — 99214 OFFICE O/P EST MOD 30 MIN: CPT | Mod: 25 | Performed by: FAMILY MEDICINE

## 2024-06-21 PROCEDURE — 36415 COLL VENOUS BLD VENIPUNCTURE: CPT | Performed by: FAMILY MEDICINE

## 2024-06-21 PROCEDURE — 90746 HEPB VACCINE 3 DOSE ADULT IM: CPT | Performed by: FAMILY MEDICINE

## 2024-06-21 PROCEDURE — 80053 COMPREHEN METABOLIC PANEL: CPT | Performed by: FAMILY MEDICINE

## 2024-06-21 RX ORDER — LORAZEPAM 0.5 MG/1
0.5 TABLET ORAL DAILY
Qty: 30 TABLET | Refills: 0 | Status: SHIPPED | OUTPATIENT
Start: 2024-06-21

## 2024-06-21 RX ORDER — TIRZEPATIDE 5 MG/.5ML
5 INJECTION, SOLUTION SUBCUTANEOUS
Qty: 2 ML | Refills: 5 | Status: SHIPPED | OUTPATIENT
Start: 2024-06-21

## 2024-06-21 RX ORDER — ATORVASTATIN CALCIUM 20 MG/1
20 TABLET, FILM COATED ORAL DAILY
Qty: 90 TABLET | Refills: 1 | Status: SHIPPED | OUTPATIENT
Start: 2024-06-21 | End: 2024-10-03

## 2024-06-21 RX ORDER — GALCANEZUMAB 120 MG/ML
120 INJECTION, SOLUTION SUBCUTANEOUS
COMMUNITY
Start: 2024-05-24

## 2024-06-21 ASSESSMENT — PAIN SCALES - GENERAL: PAINLEVEL: NO PAIN (0)

## 2024-06-21 NOTE — PATIENT INSTRUCTIONS
"Patient Education   Preventive Care Advice   This is general advice we often give to help people stay healthy. Your care team may have specific advice just for you. Please talk to your care team about your own preventive care needs.  Lifestyle  Exercise at least 150 minutes each week (30 minutes a day, 5 days a week).  Do muscle strengthening activities 2 days a week. These help control your weight and prevent disease.  No smoking.  Wear sunscreen to prevent skin cancer.  Have your home tested for radon every 2 to 5 years. Radon is a colorless, odorless gas that can harm your lungs. To learn more, go to www.health.[facility].[place].us and search for \"Radon in Homes.\"  Keep guns unloaded and locked up in a safe place like a safe or gun vault, or, use a gun lock and hide the keys. Always lock away bullets separately. To learn more, visit [employer].gov and search for \"safe gun storage.\"  Nutrition  Eat 5 or more servings of fruits and vegetables each day.  Try wheat bread, brown rice and whole grain pasta (instead of white bread, rice, and pasta).  Get enough calcium and vitamin D. Check the label on foods and aim for 100% of the RDA (recommended daily allowance).  Regular exams  Have a dental exam and cleaning every 6 months.  See your health care team every year to talk about:  Any changes in your health.  Any medicines your care team has prescribed.  Preventive care, family planning, and ways to prevent chronic diseases.  Shots (vaccines)   HPV shots (up to age 26), if you've never had them before.  Hepatitis B shots (up to age 59), if you've never had them before.  COVID-19 shot: Get this shot when it's due.  Flu shot: Get a flu shot every year.  Tetanus shot: Get a tetanus shot every 10 years.  Pneumococcal, hepatitis A, and RSV shots: Ask your care team if you need these based on your risk.  Shingles shot (for age 50 and up).  General health tests  Diabetes screening:  Starting at age 35, Get screened for diabetes at least " every 3 years.  If you are younger than age 35, ask your care team if you should be screened for diabetes.  Cholesterol test: At age 39, start having a cholesterol test every 5 years, or more often if advised.  Bone density scan (DEXA): At age 50, ask your care team if you should have this scan for osteoporosis (brittle bones).  Hepatitis C: Get tested at least once in your life.  Abdominal aortic aneurysm screening: Talk to your doctor about having this screening if you:  Have ever smoked; and  Are biologically male; and  Are between the ages of 65 and 75.  STIs (sexually transmitted infections)  Before age 24: Ask your care team if you should be screened for STIs.  After age 24: Get screened for STIs if you're at risk. You are at risk for STIs (including HIV) if:  You are sexually active with more than one person.  You don't use condoms every time.  You or a partner was diagnosed with a sexually transmitted infection.  If you are at risk for HIV, ask about PrEP medicine to prevent HIV.  Get tested for HIV at least once in your life, whether you are at risk for HIV or not.  Cancer screening tests  Cervical cancer screening: If you have a cervix, begin getting regular cervical cancer screening tests at age 21. Most people who have regular screenings with normal results can stop after age 65. Talk about this with your provider.  Breast cancer scan (mammogram): If you've ever had breasts, begin having regular mammograms starting at age 40. This is a scan to check for breast cancer.  Colon cancer screening: It is important to start screening for colon cancer at age 45.  Have a colonoscopy test every 10 years (or more often if you're at risk) Or, ask your provider about stool tests like a FIT test every year or Cologuard test every 3 years.  To learn more about your testing options, visit: www.[employer]/733418.pdf.  For help making a decision, visit: kamila/hm82925.  Prostate cancer screening test: If you have a  prostate and are age 55 to 69, ask your provider if you would benefit from a yearly prostate cancer screening test.  Lung cancer screening: If you are a current or former smoker age 50 to 80, ask your care team if ongoing lung cancer screenings are right for you.  For informational purposes only. Not to replace the advice of your health care provider. Copyright   2023 [facility]. All rights reserved. Clinically reviewed by the  [employer] [facility]. [employer] 433759 - REV 04/24.  Preventing Falls: Care Instructions  Injuries and health problems such as trouble walking or poor eyesight can increase your risk of falling. So can some medicines. But there are things you can do to help prevent falls. You can exercise to get stronger. You can also arrange your home to make it safer.    Talk to your doctor about the medicines you take. Ask if any of them increase the risk of falls and whether they can be changed or stopped.   Try to exercise regularly. It can help improve your strength and balance. This can help lower your risk of falling.     Practice fall safety and prevention.    Wear low-heeled shoes that fit well and give your feet good support. Talk to your doctor if you have foot problems that make this hard.  Carry a cellphone or wear a medical alert device that you can use to call for help.  Use stepladders instead of chairs to reach high objects. Don't climb if you're at risk for falls. Ask for help, if needed.  Wear the correct eyeglasses, if you need them.    Make your home safer.    Remove rugs, cords, clutter, and furniture from walkways.  Keep your house well lit. Use night-lights in hallways and bathrooms.  Install and use sturdy handrails on stairways.  Wear nonskid footwear, even inside. Don't walk barefoot or in socks without shoes.    Be safe outside.    Use handrails, curb cuts, and ramps whenever possible.  Keep your hands free by using a shoulder bag or backpack.  Try  "to walk in well-lit areas. Watch out for uneven ground, changes in pavement, and debris.  Be careful in the winter. Walk on the grass or gravel when sidewalks are slippery. Use de-icer on steps and walkways. Add non-slip devices to shoes.    Put grab bars and nonskid mats in your shower or tub and near the toilet. Try to use a shower chair or bath bench when bathing.   Get into a tub or shower by putting in your weaker leg first. Get out with your strong side first. Have a phone or medical alert device in the bathroom with you.   Where can you learn more?  Go to https://www.[employer].net/patiented  Enter G117 in the search box to learn more about \"Preventing Falls: Care Instructions.\"  Current as of: July 17, 2023               Content Version: 14.0    8279-3514 [employer].   Care instructions adapted under license by your healthcare professional. If you have questions about a medical condition or this instruction, always ask your healthcare professional. Healthwise, [employer] disclaims any warranty or liability for your use of this information.         "

## 2024-06-21 NOTE — PROGRESS NOTES
Preventive Care Visit  [facility]  Lakeisha Haley MD, Family Medicine  Jun 21, 2024      Assessment & Plan     Routine general medical examination at a health care facility    - Pneumococcal 20 Valent Conjugate (Prevnar 20)  - HEPATITIS B, ADULT 20+ (ENGERIX-B/RECOMBIVAX HB)    Type 2 diabetes mellitus without complication, without long-term current use of insulin (H)  Improved control  On mounjaro and increasing to 5 mg this week   Will start statin   ASA will hold for now   - tirzepatide (MOUNJARO) 5 MG/0.5ML pen; Inject 5 mg Subcutaneous every 7 days  - Hemoglobin A1c; Future  - Comprehensive metabolic panel; Future  - atorvastatin (LIPITOR) 20 MG tablet; Take 1 tablet (20 mg) by mouth daily  - Hemoglobin A1c; Future  - Lipid panel reflex to direct LDL Fasting; Future  - Hemoglobin A1c  - Comprehensive metabolic panel    Other migraine without status migrainosus, not intractable  Stable no change in treatment plan.   - EMGALITY 120 MG/ML injection; Inject 120 mg Subcutaneous every 28 days    Visit for screening mammogram    - MA Screening Bilateral w/ Sarbjit; Future    Anxiety    - LORazepam (ATIVAN) 0.5 MG tablet; Take 1 tablet (0.5 mg) by mouth daily For panic or for flying    Patient has been advised of split billing requirements and indicates understanding: Yes        Counseling  Appropriate preventive services were discussed with this patient, including applicable screening as appropriate for fall prevention, nutrition, physical activity, Tobacco-use cessation, weight loss and cognition.  Checklist reviewing preventive services available has been given to the patient.  Reviewed patient's diet, addressing concerns and/or questions.   She is at risk for psychosocial distress and has been provided with information to reduce risk.           Hailee Frost is a 58 year old, presenting for the following:  Physical        6/21/2024     8:08 AM   Additional Questions   Roomed by  Juany TREJO   Accompanied by self         6/21/2024     8:08 AM   Patient Reported Additional Medications   Patient reports taking the following new medications .        Via the Health Maintenance questionnaire, the patient has reported the following services have been completed -Eye Exam: [facility] 2023-09-14, this information has been sent to the abstraction team.  Health Care Directive  Patient does not have a Health Care Directive or Living Will: did not discuss     HPI      Diabetes Follow-up    How often are you checking your blood sugar? Continuous glucose monitor  What time of day are you checking your blood sugars (select all that apply)?  Before and after meals  Have you had any blood sugars above 200?  No  Have you had any blood sugars below 70?  Yes 2 readings since having dexcom  What symptoms do you notice when your blood sugar is low?  None  What concerns do you have today about your diabetes? Other: Blood sugars are coming down - stopped Metformin   Do you have any of these symptoms? (Select all that apply)  No numbness or tingling in feet.  No redness, sores or blisters on feet.  No complaints of excessive thirst.  No reports of blurry vision.  No significant changes to weight.      BP Readings from Last 2 Encounters:   06/21/24 110/70   04/03/24 128/79     Hemoglobin A1C (%)   Date Value   05/20/2024 7.8 (H)     LDL Cholesterol Calculated (mg/dL)   Date Value   05/20/2024 135 (H)   06/07/2023 135 (H)   05/12/2021 132 (H)   11/28/2016 132 (H)         Anxiety   How are you doing with your anxiety since your last visit? No change  Are you having other symptoms that might be associated with anxiety? No  Have you had a significant life event? No   Are you feeling depressed? No  Do you have any concerns with your use of alcohol or other drugs? No    Migraine   Stable on meds       Social History     Tobacco Use    Smoking status: Former     Current packs/day: 0.00     Types: Cigarettes     Quit date:  6/30/2013     Years since quitting: 10.9    Smokeless tobacco: Never    Tobacco comments:     quit plan offered   Vaping Use    Vaping status: Some Days    Substances: Nicotine   Substance Use Topics    Alcohol use: No    Drug use: No          No data to display                   No data to display                    6/17/2024   General Health   How would you rate your overall physical health? (!) FAIR   Feel stress (tense, anxious, or unable to sleep) Only a little      (!) STRESS CONCERN      6/17/2024   Nutrition   Three or more servings of calcium each day? Yes   Diet: Diabetic   How many servings of fruit and vegetables per day? (!) 2-3   How many sweetened beverages each day? 0-1            6/17/2024   Exercise   Days per week of moderate/strenous exercise 6 days   Average minutes spent exercising at this level 40 min            6/17/2024   Social Factors   Frequency of gathering with friends or relatives Twice a week   Worry food won't last until get money to buy more No   Food not last or not have enough money for food? No   Do you have housing? (Housing is defined as stable permanent housing and does not include staying ouside in a car, in a tent, in an abandoned building, in an overnight shelter, or couch-surfing.) Yes   Are you worried about losing your housing? No   Lack of transportation? No   Unable to get utilities (heat,electricity)? No            6/21/2024   Fall Risk   Gait Speed Test (Document in seconds) 4   Gait Speed Test Interpretation Less than or equal to 5.00 seconds - PASS             6/17/2024   Dental   Dentist two times every year? Yes            6/17/2024   TB Screening   Were you born outside of the [place]? No              Today's PHQ-2 Score:       4/3/2024     2:33 PM   PHQ-2 ( 1999 [employer])   Q1: Little interest or pleasure in doing things 0   Q2: Feeling down, depressed or hopeless 0   PHQ-2 Score 0         6/17/2024   Substance Use   Alcohol more than 3/day or more than 7/wk No   Do  "you use any other substances recreationally? No        Social History     Tobacco Use    Smoking status: Former     Current packs/day: 0.00     Types: Cigarettes     Quit date: 6/30/2013     Years since quitting: 10.9    Smokeless tobacco: Never    Tobacco comments:     quit plan offered   Vaping Use    Vaping status: Some Days    Substances: Nicotine   Substance Use Topics    Alcohol use: No    Drug use: No           6/20/2022   LAST FHS-7 RESULTS   1st degree relative breast or ovarian cancer No   Any relative bilateral breast cancer No   Any male have breast cancer No   Any ONE woman have BOTH breast AND ovarian cancer No   Any woman with breast cancer before 50yrs No   2 or more relatives with breast AND/OR ovarian cancer No   2 or more relatives with breast AND/OR bowel cancer No           Mammogram Screening - Mammogram every 1-2 years updated in Health Maintenance based on mutual decision making        6/17/2024   STI Screening   New sexual partner(s) since last STI/HIV test? No        History of abnormal Pap smear: Status post hysterectomy with removal of cervix and no history of CIN2 or greater or cervical cancer. Health Maintenance and Surgical History updated.       ASCVD Risk   The 10-year ASCVD risk score (Medardo ARANDA, et al., 2019) is: 4.6%    Values used to calculate the score:      Age: 58 years      Sex: Female      Is Non- : No      Diabetic: Yes      Tobacco smoker: No      Systolic Blood Pressure: 110 mmHg      Is BP treated: No      HDL Cholesterol: 38 mg/dL      Total Cholesterol: 210 mg/dL           Reviewed and updated as needed this visit by Provider   Tobacco  Allergies  Meds  Problems  Med Hx  Surg Hx  Fam Hx                  Review of Systems  Constitutional, HEENT, cardiovascular, pulmonary, gi and gu systems are negative, except as otherwise noted.     Objective    Exam  /70   Pulse 105   Resp 14   Ht 1.645 m (5' 4.75\")   Wt 81.2 kg (179 " "lb)   SpO2 97%   BMI 30.02 kg/m     Estimated body mass index is 30.02 kg/m  as calculated from the following:    Height as of this encounter: 1.645 m (5' 4.75\").    Weight as of this encounter: 81.2 kg (179 lb).    Physical Exam  GENERAL: alert and no distress  RESP: lungs clear to auscultation - no rales, rhonchi or wheezes  CV: regular rate and rhythm, normal S1 S2, no S3 or S4, no murmur, click or rub, no peripheral edema   MS: no gross musculoskeletal defects noted, no edema  PSYCH: mentation appears normal, affect normal/bright        Signed Electronically by: Lakeisha Haley MD    "

## 2024-06-21 NOTE — NURSING NOTE
"Chief Complaint   Patient presents with    Physical     /70   Pulse 105   Resp 14   Ht 1.645 m (5' 4.75\")   Wt 81.2 kg (179 lb)   SpO2 97%   BMI 30.02 kg/m   Estimated body mass index is 30.02 kg/m  as calculated from the following:    Height as of this encounter: 1.645 m (5' 4.75\").    Weight as of this encounter: 81.2 kg (179 lb).  Patient presents to the clinic using No DME      Health Maintenance that is potentially due pending provider review:    Health Maintenance Due   Topic Date Due    DIABETIC FOOT EXAM  Never done    MIGRAINE ACTION PLAN  Never done    Pneumococcal Vaccine: Pediatrics (0 to 5 Years) and At-Risk Patients (6 to 64 Years) (1 of 2 - PCV) Never done    HEPATITIS B IMMUNIZATION (1 of 3 - 19+ 3-dose series) Never done    LUNG CANCER SCREENING  09/14/2017    COVID-19 Vaccine (4 - 2023-24 season) 09/01/2023    EYE EXAM  01/10/2024    YEARLY PREVENTIVE VISIT  06/07/2024    MAMMO SCREENING  06/20/2024                  "

## 2024-06-25 ENCOUNTER — ALLIED HEALTH/NURSE VISIT (OUTPATIENT)
Dept: EDUCATION SERVICES | Facility: CLINIC | Age: 59
End: 2024-06-25
Payer: COMMERCIAL

## 2024-06-25 DIAGNOSIS — E11.9 TYPE 2 DIABETES MELLITUS WITHOUT COMPLICATION, WITHOUT LONG-TERM CURRENT USE OF INSULIN (H): ICD-10-CM

## 2024-06-25 PROCEDURE — G0108 DIAB MANAGE TRN  PER INDIV: HCPCS | Performed by: DIETITIAN, REGISTERED

## 2024-06-25 NOTE — PROGRESS NOTES
Diabetes Self-Management Education & Support    Presents for: Individual review    Type of Service: In Person Visit      ASSESSMENT:  -newer to diabetes, she had made large changes in her meal plan.  Has cut out sugars, carbs.  Doing proteins, vegetables, some carbs at times but healthy choices.  -has taken 3 injections of 2.5 mg dose mounjaro  -reviewed stopping when full, taking smaller portions  -went over complications of diabetes as she was doing so well already.  -went over pathophysiology of diabetes  -she want tighter control: she is going to work on doing under 140 two hour post meals, she may even work on under 120 mg/dL.  -discussed mounjaro and the wt loss we  are seeing in individuals.    Patient's most recent   Lab Results   Component Value Date    A1C 6.2 06/21/2024     is meeting goal of <7.0    Diabetes knowledge and skills assessment:   Patient is knowledgeable in diabetes management concepts related to: Healthy Eating, Being Active, Monitoring, and Taking Medication    Continue education with the following diabetes management concepts: Healthy Eating, Being Active, Monitoring, Taking Medication, Problem Solving, Reducing Risks, and Healthy Coping    Based on learning assessment above, most appropriate setting for further diabetes education would be: Individual setting.      PLAN   You are doing great.  Work toward making sure you are under 140 two hours post meals.        2.    Microalbumin yearly (pee test), A1c under 7%, lipids yearly, blood pressure under 130/80, dentist twice a year,         Follow-up: as needed per pt    See Care Plan for co-developed, patient-state behavior change goals.  AVS provided for patient today.    Education Materials Provided:  No new materials provided today      SUBJECTIVE/OBJECTIVE:  Presents for: Individual review  Accompanied by: Self  Diabetes education in the past 24mo: No  Diabetes type: Type 2  Disease course: Improving  How confident are you filling out  "medical forms by yourself:: Extremely  Diabetes management related comments/concerns: No  Cultural Influences/Ethnic Background:  Not  or       Diabetes Symptoms & Complications:  Diabetes Related Symptoms: None  Weight trend: Stable  Symptom course: Improving  Disease course: Improving       Patient Problem List and Family Medical History reviewed for relevant medical history, current medical status, and diabetes risk factors.    Vitals:  There were no vitals taken for this visit.  Estimated body mass index is 30.02 kg/m  as calculated from the following:    Height as of 6/21/24: 1.645 m (5' 4.75\").    Weight as of 6/21/24: 81.2 kg (179 lb).   Last 3 BP:   BP Readings from Last 3 Encounters:   06/21/24 110/70   04/03/24 128/79   11/13/23 137/79       History   Smoking Status    Former    Types: Cigarettes   Smokeless Tobacco    Never       Labs:  Lab Results   Component Value Date    A1C 6.2 06/21/2024     Lab Results   Component Value Date     06/21/2024     06/28/2022    GLC 99 05/12/2021     Lab Results   Component Value Date     05/20/2024     05/12/2021     HDL Cholesterol   Date Value Ref Range Status   05/12/2021 40 (L) >49 mg/dL Final     Direct Measure HDL   Date Value Ref Range Status   05/20/2024 38 (L) >=50 mg/dL Final   ]  GFR Estimate   Date Value Ref Range Status   06/21/2024 >90 >60 mL/min/1.73m2 Final     Comment:     eGFR calculated using 2021 CKD-EPI equation.   05/12/2021 90 >60 mL/min/[1.73_m2] Final     Comment:     Non  GFR Calc  Starting 12/18/2018, serum creatinine based estimated GFR (eGFR) will be   calculated using the Chronic Kidney Disease Epidemiology Collaboration   (CKD-EPI) equation.       GFR Estimate If Black   Date Value Ref Range Status   05/12/2021 >90 >60 mL/min/[1.73_m2] Final     Comment:      GFR Calc  Starting 12/18/2018, serum creatinine based estimated GFR (eGFR) will be   calculated using the " "Chronic Kidney Disease Epidemiology Collaboration   (CKD-EPI) equation.       Lab Results   Component Value Date    CR 0.73 06/21/2024    CR 0.76 05/12/2021     No results found for: \"MICROALBUMIN\"    Healthy Eating:  Healthy Eating Assessed Today: Yes  Cultural/Alevism diet restrictions?: No  How many times a week on average do you eat food made away from home (restaurant/take-out)?: 1  Meals include: Breakfast, Lunch, Dinner, Evening Snack  Breakfast: protein shake with avacado (cacoa, unflavored protein, avacado, isopure), almond milk  Lunch: sunrise flour mill pizza crust that barely budged numbers.  pancake mix,  sugar free syrup allulose monk fruit.  Dinner: unsweetened coconut,  rare: baked potato, steak,  meat and vegetables.  very low carb.  likes salads.  taco salad. quest protein chips.  Snacks: ice feam coconut cream in can, whole milk fairlife, sweetner  Other: not a sweet person  Beverages: Water, Tea, Coffee, Milk    Being Active:  Being Active Assessed Today: Yes (walk on treadmill at night 40 minutes post dinner)  Barrier to exercise: Physical limitation    Monitoring:  Blood Glucose Meter: Accu-chek, CGM  Times checking blood sugar at home (number): Other (see Comments)  Please elaborate:: GCM  Times checking blood sugar at home (per): Day        Taking Medications:  Diabetes Medication(s)       Incretin Mimetic Agents       tirzepatide (MOUNJARO) 5 MG/0.5ML pen Inject 5 mg Subcutaneous every 7 days     tirzepatide (MOUNJARO) 7.5 MG/0.5ML pen Inject 7.5 mg Subcutaneous every 7 days     Patient not taking: Reported on 6/21/2024            Current Treatments: Diet, Non-insulin Injectables                     Reducing Risks:  Has dilated eye exam at least once a year?: Yes  Sees dentist every 6 months?: Yes  Feet checked by healthcare provider in the last year?: Yes    Healthy Coping:  Informal Support system:: Family, Spouse  Patient Activation Measure Survey Score:      11/8/2012    10:00 AM   ERNESTO " Score (Last Two)   ERNESTO Raw Score 44   Activation Score 70.8   ERNESTO Level 4         Care Plan and Education Provided:  There are no care plans that you recently modified to display for this patient.          Time Spent: 60 minutes  Encounter Type: Individual    Any diabetes medication dose changes were made via the CDE Protocol per the patient's primary care provider. A copy of this encounter was shared with the provider.

## 2024-06-25 NOTE — PATIENT INSTRUCTIONS
You are doing great.  Work toward making sure you are under 140 two hours post meals.        2.    Microalbumin yearly (pee test), A1c under 7%, lipids yearly, blood pressure under 130/80, dentist twice a year,

## 2024-07-16 ENCOUNTER — ALLIED HEALTH/NURSE VISIT (OUTPATIENT)
Dept: FAMILY MEDICINE | Facility: CLINIC | Age: 59
End: 2024-07-16
Payer: COMMERCIAL

## 2024-07-16 DIAGNOSIS — Z23 ENCOUNTER FOR IMMUNIZATION: Primary | ICD-10-CM

## 2024-07-16 PROCEDURE — 99207 PR NO CHARGE NURSE ONLY: CPT

## 2024-07-16 PROCEDURE — 90746 HEPB VACCINE 3 DOSE ADULT IM: CPT

## 2024-07-16 PROCEDURE — 90471 IMMUNIZATION ADMIN: CPT

## 2024-07-16 NOTE — PROGRESS NOTES
hPrior to immunization administration, verified patients identity using patient s name and date of birth. Please see Immunization Activity for additional information.     Screening Questionnaire for Adult Immunization    Are you sick today?   No   Do you have allergies to medications, food, a vaccine component or latex?   Yes, aspirin   Have you ever had a serious reaction after receiving a vaccination?   No   Do you have a long-term health problem with heart, lung, kidney, or metabolic disease (e.g., diabetes), asthma, a blood disorder, no spleen, complement component deficiency, a cochlear implant, or a spinal fluid leak?  Are you on long-term aspirin therapy?   Yes, diabetes   Do you have cancer, leukemia, HIV/AIDS, or any other immune system problem?   No   Do you have a parent, brother, or sister with an immune system problem?   No   In the past 3 months, have you taken medications that affect  your immune system, such as prednisone, other steroids, or anticancer drugs; drugs for the treatment of rheumatoid arthritis, Crohn s disease, or psoriasis; or have you had radiation treatments?   No   Have you had a seizure, or a brain or other nervous system problem?   No   During the past year, have you received a transfusion of blood or blood    products, or been given immune (gamma) globulin or antiviral drug?   No   For women: Are you pregnant or is there a chance you could become       pregnant during the next month?   No   Have you received any vaccinations in the past 4 weeks?   No     Immunization questionnaire was positive for at least one answer.  Notified Dr. Haley .    I have reviewed the following standing orders:   This patient is due and qualifies for the Hepatitis B vaccine.    Click here for Hepatitis B Standing Order    I have reviewed the vaccines inclusion and exclusion criteria; No concerns regarding eligibility.     Patient instructed to remain in clinic for 15 minutes afterwards, and to report  any adverse reactions.     Screening performed by Lubna Thibodeaux MA on 7/16/2024 at 4:05 PM.

## 2024-09-18 ENCOUNTER — LAB (OUTPATIENT)
Dept: LAB | Facility: CLINIC | Age: 59
End: 2024-09-18
Payer: COMMERCIAL

## 2024-09-18 ENCOUNTER — OFFICE VISIT (OUTPATIENT)
Dept: ORTHOPEDICS | Facility: CLINIC | Age: 59
End: 2024-09-18
Payer: COMMERCIAL

## 2024-09-18 VITALS
BODY MASS INDEX: 25.83 KG/M2 | WEIGHT: 155 LBS | SYSTOLIC BLOOD PRESSURE: 133 MMHG | DIASTOLIC BLOOD PRESSURE: 79 MMHG | HEIGHT: 65 IN

## 2024-09-18 DIAGNOSIS — M25.512 CHRONIC LEFT SHOULDER PAIN: Primary | ICD-10-CM

## 2024-09-18 DIAGNOSIS — G89.29 CHRONIC LEFT SHOULDER PAIN: Primary | ICD-10-CM

## 2024-09-18 DIAGNOSIS — Z98.890 HX OF SHOULDER SURGERY: ICD-10-CM

## 2024-09-18 DIAGNOSIS — M75.32 CALCIFIC TENDINITIS OF LEFT SHOULDER: ICD-10-CM

## 2024-09-18 DIAGNOSIS — M19.012 OSTEOARTHRITIS OF GLENOHUMERAL JOINT, LEFT: ICD-10-CM

## 2024-09-18 DIAGNOSIS — M75.82 ROTATOR CUFF TENDINITIS, LEFT: ICD-10-CM

## 2024-09-18 DIAGNOSIS — E11.9 TYPE 2 DIABETES MELLITUS WITHOUT COMPLICATION, WITHOUT LONG-TERM CURRENT USE OF INSULIN (H): ICD-10-CM

## 2024-09-18 LAB
CHOLEST SERPL-MCNC: 201 MG/DL
EST. AVERAGE GLUCOSE BLD GHB EST-MCNC: 108 MG/DL
FASTING STATUS PATIENT QL REPORTED: NO
HBA1C MFR BLD: 5.4 % (ref 0–5.6)
HDLC SERPL-MCNC: 32 MG/DL
LDLC SERPL CALC-MCNC: 126 MG/DL
NONHDLC SERPL-MCNC: 169 MG/DL
TRIGL SERPL-MCNC: 213 MG/DL

## 2024-09-18 PROCEDURE — 20611 DRAIN/INJ JOINT/BURSA W/US: CPT | Mod: LT | Performed by: FAMILY MEDICINE

## 2024-09-18 PROCEDURE — 80061 LIPID PANEL: CPT

## 2024-09-18 PROCEDURE — 36415 COLL VENOUS BLD VENIPUNCTURE: CPT

## 2024-09-18 PROCEDURE — 83036 HEMOGLOBIN GLYCOSYLATED A1C: CPT

## 2024-09-18 RX ORDER — TRIAMCINOLONE ACETONIDE 40 MG/ML
40 INJECTION, SUSPENSION INTRA-ARTICULAR; INTRAMUSCULAR
Status: SHIPPED | OUTPATIENT
Start: 2024-09-18

## 2024-09-18 RX ORDER — ROPIVACAINE HYDROCHLORIDE 5 MG/ML
3 INJECTION, SOLUTION EPIDURAL; INFILTRATION; PERINEURAL
Status: SHIPPED | OUTPATIENT
Start: 2024-09-18

## 2024-09-18 RX ADMIN — ROPIVACAINE HYDROCHLORIDE 3 ML: 5 INJECTION, SOLUTION EPIDURAL; INFILTRATION; PERINEURAL at 09:07

## 2024-09-18 RX ADMIN — TRIAMCINOLONE ACETONIDE 40 MG: 40 INJECTION, SUSPENSION INTRA-ARTICULAR; INTRAMUSCULAR at 09:07

## 2024-09-18 NOTE — PROGRESS NOTES
"Margot Kendrick  :  1965  DOS: 24  MRN: [id number]    Sports Medicine Clinic Visit    PCP: Lakeisha Haley    Margot Kendrick is a 58 year old Right hand dominant female who is seen as a self referral presenting with left shoulder pain.    Injury: Gradual onset of pain over the past 6 month(s).  Pain located over left shoulder, nonradiating.  Reports constant diffuse pain around the shoulder. No additional sx.  Symptoms are better with Home Exercises, arthritic medicines reported by patient (mobic).  Symptoms are worse with: extension, flexion, and reaching.  Other evaluation and/or treatments so far consists of: Nothing, notes restriction to Tylenol, no other medicines due to medical concerns.  Recent imaging completed: No recent imaging completed.  Prior History of related problems: Clavicle resection of both sides in  or .    Social History:      Interim History - 2024  Since last visit on 2024 patient has moderate left lateral shoulder & periscapular pain.  Left shoulder glenohumeral joint injection completed on 24 provided relief for ~ 2 weeks.  Patient noted she felt \"pulling, tear\" sensation in lateral shoulder, while removing compression stocking ~ 2+ months ago.  Also noting palpable/painful soft tissue lump in left medial scapular border for the past 2 weeks that is worse with standing over sink & reaching.  No new injury in the interim.    Review of Systems  Musculoskeletal: as above  Remainder of review of systems is negative including constitutional, CV, pulmonary, GI, Skin and Neurologic except as noted in HPI or medical history.    Past Medical History:   Diagnosis Date    Arthritis     Esophageal reflux     GERD    Migraine, unspecified, without mention of intractable migraine without mention of status migrainosus     Mitral valve disorders(424.0)     thought possible MVP, not documented    Myalgia and myositis, " unspecified     Fibromyalgia    Type 2 diabetes mellitus without complication, without long-term current use of insulin (H) 2024     Past Surgical History:   Procedure Laterality Date    ABDOMEN SURGERY  2005    Right Sanjiv-Collectomy    APPENDECTOMY  2005    With collectomy    COLONOSCOPY      COLONOSCOPY  2012    Procedure: COLONOSCOPY;  Colonoscopy;  Surgeon: Jun Reddy MD;  Location: [facility]    COLONOSCOPY N/A 02/15/2016    Procedure: COLONOSCOPY;  Surgeon: Anson Sethi MD;  Location: [facility]    ENT SURGERY      ENT SURGERY  2000    Sinus    ESOPHAGOSCOPY, GASTROSCOPY, DUODENOSCOPY (EGD), COMBINED N/A 10/08/2015    Procedure: COMBINED ESOPHAGOSCOPY, GASTROSCOPY, DUODENOSCOPY (EGD), BIOPSY SINGLE OR MULTIPLE;  Surgeon: Anson Sethi MD;  Location: [facility]    EYE SURGERY  2015    Recurrent corneal erosion    HYSTERECTOMY, VAGINAL  2002    LAPAROSCOPIC CHOLECYSTECTOMY N/A 10/27/2015    Procedure: LAPAROSCOPIC CHOLECYSTECTOMY;  Surgeon: Anson Sethi MD;  Location: [facility]    ORTHOPEDIC SURGERY      ORTHOPEDIC SURGERY      Left shoulder/Right Shoulder/Right Wrist x 3    SURGICAL HISTORY OF -   ,,        SURGICAL HISTORY OF -   1993    Excision (L) ganglion cyst    SURGICAL HISTORY OF -   1993    Tubal ligation    SURGICAL HISTORY OF -   1998    Septoplasty/sinus surgery    SURGICAL HISTORY OF -   1999    TVH    SURGICAL HISTORY OF -   2004/ /15/2005    Colonoscopy    SURGICAL HISTORY OF -   2001    Gastroscopy    SURGICAL HISTORY OF -   1998    Septoplasty    SURGICAL HISTORY OF -   2005    rt sanjiv colectomy    SURGICAL HISTORY OF -   2005    Open distal clavicle resection with subacromial decompression    SURGICAL HISTORY OF -   2008    rt wrist TFCC    SURGICAL HISTORY OF -   2008    rt wrist removal of scar tissue    SURGICAL HISTORY OF -   2009     "Arthroscopic subacromial decompression with introduction of On-Q pain pump.      Family History   Problem Relation Age of Onset    C.A.D. Maternal Grandmother     Arthritis Maternal Grandmother         rheumatoid arthritis    Diabetes Maternal Grandmother     Cardiovascular Maternal Grandfather     Coronary Artery Disease Maternal Grandfather     Cardiovascular Paternal Grandmother     Cardiovascular Paternal Grandfather     Respiratory Daughter         ASTHMA    Neurologic Disorder Father         FIBROMYALGIA    Heart Disease Father 68    C.A.D. Father 68        MI    Osteoporosis Father     Neurologic Disorder Brother         FIBROMYALGIA    Neurologic Disorder Sister         FIBROMYALGIA    Anxiety Disorder Sister     Thyroid Disease Mother     Hypertension Mother     Hyperlipidemia Mother     Arthritis Maternal Aunt         psoriatic    Diabetes Other         Maternal Aunt    Coronary Artery Disease Other         Maternal Aunt    Hypertension Other         Maternal Aunt    Hyperlipidemia Other         Maternal Aunt    Melanoma No family hx of        Objective  /79   Ht 1.645 m (5' 4.75\")   Wt 70.3 kg (155 lb)   BMI 25.99 kg/m      General: healthy, alert and in no distress    HEENT: no scleral icterus or conjunctival erythema   Skin: no suspicious lesions or rash. No jaundice.   CV: regular rhythm by palpation, 2+ distal pulses, no pedal edema    Resp: normal respiratory effort without conversational dyspnea   Psych: normal mood and affect    Gait: nonantalgic, appropriate coordination and balance   Neuro: normal light touch sensory exam of the extremities. Motor strength as noted below     Left Shoulder exam    ROM:        Mildly decreased terminal active and passive ROM with flexion > extension, abduction, internal and external rotation, also midrange pain with abduction and flexion actively    Tender:        Anterior and lateral shoulder       Anterior GH joint    Non Tender:       remainder of " shoulder       sternoclavicular joint       acromioclavicular joint       periscapular region    Strength:        abduction 4/5       internal rotation 5/5       external rotation 4/5       adduction 5/5    Impingement testing:        neg (-) Neer       Mildly painful Mata       painful empty can       neg (-) crossover       Very mild pain with crank    Stability testing:       neg (-) anterior glide       neg (-) sulcus sign    Skin:       no visible deformities       well perfused       capillary refill brisk    Sensation:        normal sensation over shoulder and upper extremity     Radiology  Recent Results (from the past 744 hour(s))   XR Shoulder Left G/E 3 Views    Narrative    XR SHOULDER LEFT G/E 3 VIEWS   5/17/2024 8:11 AM     HISTORY: Chronic left shoulder pain; Chronic left shoulder pain  COMPARISON: None.       Impression    IMPRESSION:     Views of the left shoulder are negative for acute fracture or  dislocation. No definite acromioclavicular glenohumeral joint space  narrowing. There is diffuse osseous demineralization.    ALTON PLEITEZ MD         SYSTEM ID:  [id number]       Assessment:  1. Chronic left shoulder pain    2. Rotator cuff tendinitis, left    3. Calcific tendinitis of left shoulder    4. Osteoarthritis of glenohumeral joint, left    5. Hx of shoulder surgery        Plan:  Discussed the assessment with the patient.  Follow up: prn based on short term clinical progress  Acute flare of RTC pain >> GH joint pain on exam today, mild underlying DJD noted on XR, inferior glenoid osteophyte  Somewhat different from last visit where GH joint pain and CSI to GH joint was very helpful  Calcifications noted overlying supraspinatus, focal pain in this region consistent with flare of calcific tend  PT recommended and available anytime, declined for now, reviewed basic HEP  Could consider advanced imaging based on short term progress, defer for now, low suspicion for full-thickness RTC tear  Us  guided subacromial CSI today, reviewed diagnostic and therapeutic goals as well as relative risks and limitations  XR images independently visualized and reviewed with patient today in clinic  Oral Tylenol and topical Voltaren gel reviewed as safe OTC options, reviewed safe dosing strategies  Expectations and goals of CSI reviewed  Often 2-3 days for steroid effect, and can take up to two weeks for maximum effect  We discussed modified progressive pain-free activity as tolerated  Do not overuse in first two weeks if feeling better due to concern for vulnerability while steroid is working  Supportive care reviewed  All questions were answered today  Contact us with additional questions or concerns  Signs and sx of concern reviewed      Jorge Lacy DO, AMBREEN  Sports Medicine Physician  [facility]          Disclaimer: This note consists of symbols derived from keyboarding, dictation and/or voice recognition software. As a result, there may be errors in the script that have gone undetected. Please consider this when interpreting information found in this chart.    Large Joint Injection/Arthocentesis: L subacromial bursa    Date/Time: 9/18/2024 9:07 AM    Performed by: Jorge Lacy DO  Authorized by: Jorge Lacy DO    Indications:  Pain  Indications comment:  Calcific Tendinitis  Needle Size:  22 G  Guidance: ultrasound    Approach:  Lateral  Location:  Shoulder      Site:  L subacromial bursa  Medications:  40 mg triamcinolone 40 MG/ML; 3 mL ROPivacaine 5 MG/ML  Outcome:  Tolerated well, no immediate complications  Procedure discussed: discussed risks, benefits, and alternatives    Consent Given by:  Patient  Timeout: timeout called immediately prior to procedure    Prep: patient was prepped and draped in usual sterile fashion     Area of calcific tendinitis was located within the rotator cuff/supraspinatus tendon under ultrasound.  Injection was split equally  between subacromial bursa and this area.  Ultrasound images of procedure were permanently stored.

## 2024-09-18 NOTE — LETTER
"2024      Margot Kendrick  [address] [facility] [address]      Dear Colleague,    Thank you for referring your patient, Margot Kendrick, to the [facility]. Please see a copy of my visit note below.    Margot Kendrick  :  1965  DOS: 24  MRN: [id number]    Sports Medicine Clinic Visit    PCP: Lakeisha Haley    Margot Kendrick is a 58 year old Right hand dominant female who is seen as a self referral presenting with left shoulder pain.    Injury: Gradual onset of pain over the past 6 month(s).  Pain located over left shoulder, nonradiating.  Reports constant diffuse pain around the shoulder. No additional sx.  Symptoms are better with Home Exercises, arthritic medicines reported by patient (mobic).  Symptoms are worse with: extension, flexion, and reaching.  Other evaluation and/or treatments so far consists of: Nothing, notes restriction to Tylenol, no other medicines due to medical concerns.  Recent imaging completed: No recent imaging completed.  Prior History of related problems: Clavicle resection of both sides in  or .    Social History:      Interim History - 2024  Since last visit on 2024 patient has moderate left lateral shoulder & periscapular pain.  Left shoulder glenohumeral joint injection completed on 24 provided relief for ~ 2 weeks.  Patient noted she felt \"pulling, tear\" sensation in lateral shoulder, while removing compression stocking ~ 2+ months ago.  Also noting palpable/painful soft tissue lump in left medial scapular border for the past 2 weeks that is worse with standing over sink & reaching.  No new injury in the interim.    Review of Systems  Musculoskeletal: as above  Remainder of review of systems is negative including constitutional, CV, pulmonary, GI, Skin and Neurologic except as noted in HPI or medical history.    Past Medical " History:   Diagnosis Date     Arthritis      Esophageal reflux     GERD     Migraine, unspecified, without mention of intractable migraine without mention of status migrainosus      Mitral valve disorders(424.0)     thought possible MVP, not documented     Myalgia and myositis, unspecified     Fibromyalgia     Type 2 diabetes mellitus without complication, without long-term current use of insulin (H) 2024     Past Surgical History:   Procedure Laterality Date     ABDOMEN SURGERY  2005    Right Sanjiv-Collectomy     APPENDECTOMY  2005    With collectomy     COLONOSCOPY       COLONOSCOPY  2012    Procedure: COLONOSCOPY;  Colonoscopy;  Surgeon: Jun Reddy MD;  Location: [facility]     COLONOSCOPY N/A 02/15/2016    Procedure: COLONOSCOPY;  Surgeon: Anson Sethi MD;  Location: [facility]     ENT SURGERY       ENT SURGERY  2000    Sinus     ESOPHAGOSCOPY, GASTROSCOPY, DUODENOSCOPY (EGD), COMBINED N/A 10/08/2015    Procedure: COMBINED ESOPHAGOSCOPY, GASTROSCOPY, DUODENOSCOPY (EGD), BIOPSY SINGLE OR MULTIPLE;  Surgeon: Anson Sethi MD;  Location: [facility]     EYE SURGERY  2015    Recurrent corneal erosion     HYSTERECTOMY, VAGINAL  2002     LAPAROSCOPIC CHOLECYSTECTOMY N/A 10/27/2015    Procedure: LAPAROSCOPIC CHOLECYSTECTOMY;  Surgeon: Anson Sethi MD;  Location: [facility]     ORTHOPEDIC SURGERY       ORTHOPEDIC SURGERY      Left shoulder/Right Shoulder/Right Wrist x 3     SURGICAL HISTORY OF -   ,,         SURGICAL HISTORY OF -   1993    Excision (L) ganglion cyst     SURGICAL HISTORY OF -   1993    Tubal ligation     SURGICAL HISTORY OF -   1998    Septoplasty/sinus surgery     SURGICAL HISTORY OF -   1999    TVH     SURGICAL HISTORY OF -   2004/ /15/2005    Colonoscopy     SURGICAL HISTORY OF -   2001    Gastroscopy     SURGICAL HISTORY OF -   1998    Septoplasty     SURGICAL HISTORY OF -   2005  "   rt craig colectomy     SURGICAL HISTORY OF -   12/01/2005    Open distal clavicle resection with subacromial decompression     SURGICAL HISTORY OF -   04/11/2008    rt wrist TFCC     SURGICAL HISTORY OF -   05/09/2008    rt wrist removal of scar tissue     SURGICAL HISTORY OF -   12/01/2009    Arthroscopic subacromial decompression with introduction of On-Q pain pump.      Family History   Problem Relation Age of Onset     C.A.D. Maternal Grandmother      Arthritis Maternal Grandmother         rheumatoid arthritis     Diabetes Maternal Grandmother      Cardiovascular Maternal Grandfather      Coronary Artery Disease Maternal Grandfather      Cardiovascular Paternal Grandmother      Cardiovascular Paternal Grandfather      Respiratory Daughter         ASTHMA     Neurologic Disorder Father         FIBROMYALGIA     Heart Disease Father 68     C.A.D. Father 68        MI     Osteoporosis Father      Neurologic Disorder Brother         FIBROMYALGIA     Neurologic Disorder Sister         FIBROMYALGIA     Anxiety Disorder Sister      Thyroid Disease Mother      Hypertension Mother      Hyperlipidemia Mother      Arthritis Maternal Aunt         psoriatic     Diabetes Other         Maternal Aunt     Coronary Artery Disease Other         Maternal Aunt     Hypertension Other         Maternal Aunt     Hyperlipidemia Other         Maternal Aunt     Melanoma No family hx of        Objective  /79   Ht 1.645 m (5' 4.75\")   Wt 70.3 kg (155 lb)   BMI 25.99 kg/m      General: healthy, alert and in no distress    HEENT: no scleral icterus or conjunctival erythema   Skin: no suspicious lesions or rash. No jaundice.   CV: regular rhythm by palpation, 2+ distal pulses, no pedal edema    Resp: normal respiratory effort without conversational dyspnea   Psych: normal mood and affect    Gait: nonantalgic, appropriate coordination and balance   Neuro: normal light touch sensory exam of the extremities. Motor strength as noted below "     Left Shoulder exam    ROM:        Mildly decreased terminal active and passive ROM with flexion > extension, abduction, internal and external rotation, also midrange pain with abduction and flexion actively    Tender:        Anterior and lateral shoulder       Anterior GH joint    Non Tender:       remainder of shoulder       sternoclavicular joint       acromioclavicular joint       periscapular region    Strength:        abduction 4/5       internal rotation 5/5       external rotation 4/5       adduction 5/5    Impingement testing:        neg (-) Neer       Mildly painful Mata       painful empty can       neg (-) crossover       Very mild pain with crank    Stability testing:       neg (-) anterior glide       neg (-) sulcus sign    Skin:       no visible deformities       well perfused       capillary refill brisk    Sensation:        normal sensation over shoulder and upper extremity     Radiology  Recent Results (from the past 744 hour(s))   XR Shoulder Left G/E 3 Views    Narrative    XR SHOULDER LEFT G/E 3 VIEWS   5/17/2024 8:11 AM     HISTORY: Chronic left shoulder pain; Chronic left shoulder pain  COMPARISON: None.       Impression    IMPRESSION:     Views of the left shoulder are negative for acute fracture or  dislocation. No definite acromioclavicular glenohumeral joint space  narrowing. There is diffuse osseous demineralization.    ALTON PLEITEZ MD         SYSTEM ID:  [id number]       Assessment:  1. Chronic left shoulder pain    2. Rotator cuff tendinitis, left    3. Calcific tendinitis of left shoulder    4. Osteoarthritis of glenohumeral joint, left    5. Hx of shoulder surgery        Plan:  Discussed the assessment with the patient.  Follow up: prn based on short term clinical progress  Acute flare of RTC pain >> GH joint pain on exam today, mild underlying DJD noted on XR, inferior glenoid osteophyte  Somewhat different from last visit where GH joint pain and CSI to GH joint was very  helpful  Calcifications noted overlying supraspinatus, focal pain in this region consistent with flare of calcific tend  PT recommended and available anytime, declined for now, reviewed basic HEP  Could consider advanced imaging based on short term progress, defer for now, low suspicion for full-thickness RTC tear  Us guided subacromial CSI today, reviewed diagnostic and therapeutic goals as well as relative risks and limitations  XR images independently visualized and reviewed with patient today in clinic  Oral Tylenol and topical Voltaren gel reviewed as safe OTC options, reviewed safe dosing strategies  Expectations and goals of CSI reviewed  Often 2-3 days for steroid effect, and can take up to two weeks for maximum effect  We discussed modified progressive pain-free activity as tolerated  Do not overuse in first two weeks if feeling better due to concern for vulnerability while steroid is working  Supportive care reviewed  All questions were answered today  Contact us with additional questions or concerns  Signs and sx of concern reviewed      Jorge Lacy DO, AMBREEN  Sports Medicine Physician  [facility]          Disclaimer: This note consists of symbols derived from keyboarding, dictation and/or voice recognition software. As a result, there may be errors in the script that have gone undetected. Please consider this when interpreting information found in this chart.    Large Joint Injection/Arthocentesis: L subacromial bursa    Date/Time: 9/18/2024 9:07 AM    Performed by: Jorge Lacy DO  Authorized by: Jorge Lacy DO    Indications:  Pain  Indications comment:  Calcific Tendinitis  Needle Size:  22 G  Guidance: ultrasound    Approach:  Lateral  Location:  Shoulder      Site:  L subacromial bursa  Medications:  40 mg triamcinolone 40 MG/ML; 3 mL ROPivacaine 5 MG/ML  Outcome:  Tolerated well, no immediate complications  Procedure discussed: discussed  risks, benefits, and alternatives    Consent Given by:  Patient  Timeout: timeout called immediately prior to procedure    Prep: patient was prepped and draped in usual sterile fashion     Area of calcific tendinitis was located within the rotator cuff/supraspinatus tendon under ultrasound.  Injection was split equally between subacromial bursa and this area.  Ultrasound images of procedure were permanently stored.             Again, thank you for allowing me to participate in the care of your patient.        Sincerely,        Jorge Lacy, DO

## 2024-10-03 ENCOUNTER — TRANSFERRED RECORDS (OUTPATIENT)
Dept: HEALTH INFORMATION MANAGEMENT | Facility: CLINIC | Age: 59
End: 2024-10-03

## 2024-10-03 ENCOUNTER — OFFICE VISIT (OUTPATIENT)
Dept: SURGERY | Facility: CLINIC | Age: 59
End: 2024-10-03
Attending: FAMILY MEDICINE
Payer: COMMERCIAL

## 2024-10-03 VITALS
WEIGHT: 155 LBS | DIASTOLIC BLOOD PRESSURE: 82 MMHG | HEIGHT: 65 IN | SYSTOLIC BLOOD PRESSURE: 123 MMHG | BODY MASS INDEX: 25.83 KG/M2 | OXYGEN SATURATION: 100 % | HEART RATE: 94 BPM

## 2024-10-03 DIAGNOSIS — D17.1 LIPOMA OF BACK: ICD-10-CM

## 2024-10-03 PROCEDURE — 99203 OFFICE O/P NEW LOW 30 MIN: CPT | Performed by: SURGERY

## 2024-10-03 ASSESSMENT — PAIN SCALES - GENERAL: PAINLEVEL: EXTREME PAIN (9)

## 2024-10-03 NOTE — PROGRESS NOTES
Surgical Consultation/History and Physical  [facility] General Surgery    Margot is seen in consultation for Lipoma, at the request of Lakeisha Haley MD.    Chief Complaint:  Lipoma    History of Present Illness: Margot Kendrick is a 59 year old female presents with Lipoma of her back.  She notes a mass 3 weeks ago on her upper back.  She feels discomfort associated with this, limiting her activities.  Denies drainage.  She had a cyst removed 20 years ago near this.      Patient Active Problem List   Diagnosis    Abdominal pain, generalized    Irritable bowel syndrome    Esophageal reflux    Sensorineural hearing loss    Tobacco abuse    Chiari malformation type I (H)    FAMILY HISTORY OF BLOOD DISORDER-NONSPEC    Migraine headache    Vitamin D deficiency    CARDIOVASCULAR SCREENING; LDL GOAL LESS THAN 160    Insomnia    Fibromyalgia    Multiple joint pain    Type 2 diabetes mellitus without complication, without long-term current use of insulin (H)     Past Medical History:   Diagnosis Date    Arthritis     Esophageal reflux     GERD    Migraine, unspecified, without mention of intractable migraine without mention of status migrainosus     Mitral valve disorders(424.0)     thought possible MVP, not documented    Myalgia and myositis, unspecified     Fibromyalgia    Type 2 diabetes mellitus without complication, without long-term current use of insulin (H) 5/17/2024     Past Surgical History:   Procedure Laterality Date    ABDOMEN SURGERY  01/01/2005    Right Sanjiv-Collectomy    APPENDECTOMY  01/01/2005    With collectomy    COLONOSCOPY      COLONOSCOPY  06/11/2012    Procedure: COLONOSCOPY;  Colonoscopy;  Surgeon: Jun Reddy MD;  Location: [facility]    COLONOSCOPY N/A 02/15/2016    Procedure: COLONOSCOPY;  Surgeon: Anson Sethi MD;  Location: [facility]    ENT SURGERY      ENT SURGERY  01/01/2000    Sinus    ESOPHAGOSCOPY, GASTROSCOPY, DUODENOSCOPY (EGD), COMBINED N/A 10/08/2015    Procedure:  COMBINED ESOPHAGOSCOPY, GASTROSCOPY, DUODENOSCOPY (EGD), BIOPSY SINGLE OR MULTIPLE;  Surgeon: Anson Sethi MD;  Location: [facility]    EYE SURGERY  2015    Recurrent corneal erosion    HYSTERECTOMY, VAGINAL  2002    LAPAROSCOPIC CHOLECYSTECTOMY N/A 10/27/2015    Procedure: LAPAROSCOPIC CHOLECYSTECTOMY;  Surgeon: Anson Sethi MD;  Location: [facility]    ORTHOPEDIC SURGERY      ORTHOPEDIC SURGERY      Left shoulder/Right Shoulder/Right Wrist x 3    SURGICAL HISTORY OF -   ,,        SURGICAL HISTORY OF -   1993    Excision (L) ganglion cyst    SURGICAL HISTORY OF -   1993    Tubal ligation    SURGICAL HISTORY OF -   1998    Septoplasty/sinus surgery    SURGICAL HISTORY OF -   1999    TVH    SURGICAL HISTORY OF -   2004/ /15/2005    Colonoscopy    SURGICAL HISTORY OF -   2001    Gastroscopy    SURGICAL HISTORY OF -   1998    Septoplasty    SURGICAL HISTORY OF -   2005    rt craig colectomy    SURGICAL HISTORY OF -   2005    Open distal clavicle resection with subacromial decompression    SURGICAL HISTORY OF -   2008    rt wrist TFCC    SURGICAL HISTORY OF -   2008    rt wrist removal of scar tissue    SURGICAL HISTORY OF -   2009    Arthroscopic subacromial decompression with introduction of On-Q pain pump.        Family History   Problem Relation Age of Onset    C.A.D. Maternal Grandmother     Arthritis Maternal Grandmother         rheumatoid arthritis    Diabetes Maternal Grandmother     Cardiovascular Maternal Grandfather     Coronary Artery Disease Maternal Grandfather     Cardiovascular Paternal Grandmother     Cardiovascular Paternal Grandfather     Respiratory Daughter         ASTHMA    Neurologic Disorder Father         FIBROMYALGIA    Heart Disease Father 68    C.A.D. Father 68        MI    Osteoporosis Father     Neurologic Disorder Brother         FIBROMYALGIA    Neurologic Disorder Sister          FIBROMYALGIA    Anxiety Disorder Sister     Thyroid Disease Mother     Hypertension Mother     Hyperlipidemia Mother     Arthritis Maternal Aunt         psoriatic    Diabetes Other         Maternal Aunt    Coronary Artery Disease Other         Maternal Aunt    Hypertension Other         Maternal Aunt    Hyperlipidemia Other         Maternal Aunt    Melanoma No family hx of        Social History     Tobacco Use    Smoking status: Former     Current packs/day: 0.00     Types: Cigarettes     Quit date: 2013     Years since quittin.2    Smokeless tobacco: Never   Substance Use Topics    Alcohol use: No        History   Drug Use No       Current Outpatient Medications   Medication Sig Dispense Refill    ADVIL 200 MG OR TABS 2 tablets by mouth as needed      blood glucose (NO BRAND SPECIFIED) test strip Use to test blood sugar 2 times daily or as directed. To accompany: Blood Glucose Monitor Brands: per insurance. 100 strip 6    blood glucose monitoring (NO BRAND SPECIFIED) meter device kit Use to test blood sugar 2 times daily or as directed. Preferred blood glucose meter OR supplies to accompany: Blood Glucose Monitor Brands: per insurance. 1 kit 0    Continuous Glucose Sensor (DEXCOM G7 SENSOR) MISC 1 each every 10 days Change sensor every 10 days 9 each 5    EMGALITY 120 MG/ML injection Inject 120 mg Subcutaneous every 28 days      LORazepam (ATIVAN) 0.5 MG tablet Take 1 tablet (0.5 mg) by mouth daily For panic or for flying 30 tablet 0    naproxen sodium 220 MG capsule Take 220 mg by mouth as needed      SUMAtriptan (IMITREX) 50 MG tablet Take 1 tablet (50 mg) by mouth at onset of headache for migraine May repeat dose in 2 hours.  Do not exceed 200 mg in 24 hours 18 tablet 3    thin (NO BRAND SPECIFIED) lancets Use with lanceting device. To accompany: Blood Glucose Monitor Brands: per insurance. 100 each 6    tirzepatide (MOUNJARO) 5 MG/0.5ML pen Inject 5 mg Subcutaneous every 7 days 2 mL 5    famotidine  "(PEPCID) 40 MG tablet Take 1 tablet (40 mg) by mouth at bedtime (Patient not taking: Reported on 10/3/2024) 90 tablet 3       Allergies   Allergen Reactions    Asa [Aspirin] Nausea     TINGLING  FINGERS, HEAD,        Review of Systems:   5 point ROS otherwise negative    Physical Exam:  Ht 1.645 m (5' 4.75\")   Wt 70.3 kg (155 lb)   BMI 25.99 kg/m      Constitutional- No acute distress, well nourished, non-toxic  Eyes: Anicteric, no injection.  PERRL  ENT:  Normocephalic, atraumatic, Nose midline, moist mucus membranes, hard of hearing  Neck - supple, trachea midline  Respiratory- Good inspiratory effort  Cardiovascular - No peripheral edema.  No clubbing.  Neuro - No focal neuro deficits, Alert and oriented x 3  Psych: Appropriate mood and affect  Musculoskeletal: Normal gait, symmetric strength.  FROM upper and lower extremities.  Skin: Warm, Dry; 3.5 cm subcutaneous mass left upper back consistent with lipoma.      Assessment:  1. Lipoma of back      Plan:   Margot Kendrick has an enlarging 3.5 cm mass located on her left upper back. She may benefit from excision of this mass, which is being scheduled as an in-office procedure.  The indications, risks, benefits and alternatives of excision of this mass were discussed with Margot Kendrick. The risks discussed included but were not limited to bleeding, infection, seroma, need for additional treatment, nontherapeutic intervention, wound complication (such as dehiscence), and rare complications related to surgery and/or anesthesia such as venous thromboembolism and cardiorespiratory complications.  All of her questions were answered thoroughly and to her satisfaction, and informed consent was obtained. Consent was verified via teach back methodology.        Subhash Knox,  on 10/3/2024 at 8:07 AM    "

## 2024-10-03 NOTE — LETTER
10/3/2024      Margot Kendrick  [address] [address] [place] [address]      Dear Colleague,    Thank you for referring your patient, Margot Kendrick, to the [facility]. Please see a copy of my visit note below.    Surgical Consultation/History and Physical  [facility]    Margot is seen in consultation for Lipoma, at the request of Lakeisha Haley MD.    Chief Complaint:  Lipoma    History of Present Illness: Margot Kendrick is a 59 year old female presents with Lipoma of her back.  She notes a mass 3 weeks ago on her upper back.  She feels discomfort associated with this, limiting her activities.  Denies drainage.  She had a cyst removed 20 years ago near this.      Patient Active Problem List   Diagnosis     Abdominal pain, generalized     Irritable bowel syndrome     Esophageal reflux     Sensorineural hearing loss     Tobacco abuse     Chiari malformation type I (H)     FAMILY HISTORY OF BLOOD DISORDER-NONSPEC     Migraine headache     Vitamin D deficiency     CARDIOVASCULAR SCREENING; LDL GOAL LESS THAN 160     Insomnia     Fibromyalgia     Multiple joint pain     Type 2 diabetes mellitus without complication, without long-term current use of insulin (H)     Past Medical History:   Diagnosis Date     Arthritis      Esophageal reflux     GERD     Migraine, unspecified, without mention of intractable migraine without mention of status migrainosus      Mitral valve disorders(424.0)     thought possible MVP, not documented     Myalgia and myositis, unspecified     Fibromyalgia     Type 2 diabetes mellitus without complication, without long-term current use of insulin (H) 5/17/2024     Past Surgical History:   Procedure Laterality Date     ABDOMEN SURGERY  01/01/2005    Right Sanjiv-Collectomy     APPENDECTOMY  01/01/2005    With collectomy     COLONOSCOPY       COLONOSCOPY  06/11/2012    Procedure: COLONOSCOPY;  Colonoscopy;  Surgeon:  Jun Reddy MD;  Location: [facility]     COLONOSCOPY N/A 02/15/2016    Procedure: COLONOSCOPY;  Surgeon: Anson Sethi MD;  Location: [facility]     ENT SURGERY       ENT SURGERY  2000    Sinus     ESOPHAGOSCOPY, GASTROSCOPY, DUODENOSCOPY (EGD), COMBINED N/A 10/08/2015    Procedure: COMBINED ESOPHAGOSCOPY, GASTROSCOPY, DUODENOSCOPY (EGD), BIOPSY SINGLE OR MULTIPLE;  Surgeon: Anson Sethi MD;  Location: [facility]     EYE SURGERY  2015    Recurrent corneal erosion     HYSTERECTOMY, VAGINAL  2002     LAPAROSCOPIC CHOLECYSTECTOMY N/A 10/27/2015    Procedure: LAPAROSCOPIC CHOLECYSTECTOMY;  Surgeon: Anson Sethi MD;  Location: [facility]     ORTHOPEDIC SURGERY       ORTHOPEDIC SURGERY      Left shoulder/Right Shoulder/Right Wrist x 3     SURGICAL HISTORY OF -   ,,         SURGICAL HISTORY OF -   1993    Excision (L) ganglion cyst     SURGICAL HISTORY OF -   1993    Tubal ligation     SURGICAL HISTORY OF -   1998    Septoplasty/sinus surgery     SURGICAL HISTORY OF -   1999    TVH     SURGICAL HISTORY OF -   2004/ /15/2005    Colonoscopy     SURGICAL HISTORY OF -   2001    Gastroscopy     SURGICAL HISTORY OF -   1998    Septoplasty     SURGICAL HISTORY OF -   2005    rt craig colectomy     SURGICAL HISTORY OF -   2005    Open distal clavicle resection with subacromial decompression     SURGICAL HISTORY OF -   2008    rt wrist TFCC     SURGICAL HISTORY OF -   2008    rt wrist removal of scar tissue     SURGICAL HISTORY OF -   2009    Arthroscopic subacromial decompression with introduction of On-Q pain pump.        Family History   Problem Relation Age of Onset     C.A.D. Maternal Grandmother      Arthritis Maternal Grandmother         rheumatoid arthritis     Diabetes Maternal Grandmother      Cardiovascular Maternal Grandfather      Coronary Artery Disease Maternal Grandfather      Cardiovascular  Paternal Grandmother      Cardiovascular Paternal Grandfather      Respiratory Daughter         ASTHMA     Neurologic Disorder Father         FIBROMYALGIA     Heart Disease Father 68     C.A.D. Father 68        MI     Osteoporosis Father      Neurologic Disorder Brother         FIBROMYALGIA     Neurologic Disorder Sister         FIBROMYALGIA     Anxiety Disorder Sister      Thyroid Disease Mother      Hypertension Mother      Hyperlipidemia Mother      Arthritis Maternal Aunt         psoriatic     Diabetes Other         Maternal Aunt     Coronary Artery Disease Other         Maternal Aunt     Hypertension Other         Maternal Aunt     Hyperlipidemia Other         Maternal Aunt     Melanoma No family hx of        Social History     Tobacco Use     Smoking status: Former     Current packs/day: 0.00     Types: Cigarettes     Quit date: 2013     Years since quittin.2     Smokeless tobacco: Never   Substance Use Topics     Alcohol use: No        History   Drug Use No       Current Outpatient Medications   Medication Sig Dispense Refill     ADVIL 200 MG OR TABS 2 tablets by mouth as needed       blood glucose (NO BRAND SPECIFIED) test strip Use to test blood sugar 2 times daily or as directed. To accompany: Blood Glucose Monitor Brands: per insurance. 100 strip 6     blood glucose monitoring (NO BRAND SPECIFIED) meter device kit Use to test blood sugar 2 times daily or as directed. Preferred blood glucose meter OR supplies to accompany: Blood Glucose Monitor Brands: per insurance. 1 kit 0     Continuous Glucose Sensor (DEXCOM G7 SENSOR) MISC 1 each every 10 days Change sensor every 10 days 9 each 5     EMGALITY 120 MG/ML injection Inject 120 mg Subcutaneous every 28 days       LORazepam (ATIVAN) 0.5 MG tablet Take 1 tablet (0.5 mg) by mouth daily For panic or for flying 30 tablet 0     naproxen sodium 220 MG capsule Take 220 mg by mouth as needed       SUMAtriptan (IMITREX) 50 MG tablet Take 1 tablet (50 mg)  "by mouth at onset of headache for migraine May repeat dose in 2 hours.  Do not exceed 200 mg in 24 hours 18 tablet 3     thin (NO BRAND SPECIFIED) lancets Use with lanceting device. To accompany: Blood Glucose Monitor Brands: per insurance. 100 each 6     tirzepatide (MOUNJARO) 5 MG/0.5ML pen Inject 5 mg Subcutaneous every 7 days 2 mL 5     famotidine (PEPCID) 40 MG tablet Take 1 tablet (40 mg) by mouth at bedtime (Patient not taking: Reported on 10/3/2024) 90 tablet 3       Allergies   Allergen Reactions     Asa [Aspirin] Nausea     TINGLING  FINGERS, HEAD,        Review of Systems:   5 point ROS otherwise negative    Physical Exam:  Ht 1.645 m (5' 4.75\")   Wt 70.3 kg (155 lb)   BMI 25.99 kg/m      Constitutional- No acute distress, well nourished, non-toxic  Eyes: Anicteric, no injection.  PERRL  ENT:  Normocephalic, atraumatic, Nose midline, moist mucus membranes, hard of hearing  Neck - supple, trachea midline  Respiratory- Good inspiratory effort  Cardiovascular - No peripheral edema.  No clubbing.  Neuro - No focal neuro deficits, Alert and oriented x 3  Psych: Appropriate mood and affect  Musculoskeletal: Normal gait, symmetric strength.  FROM upper and lower extremities.  Skin: Warm, Dry; 3.5 cm subcutaneous mass left upper back consistent with lipoma.      Assessment:  1. Lipoma of back      Plan:   Margot Kendrick has an enlarging 3.5 cm mass located on her left upper back. She may benefit from excision of this mass, which is being scheduled as an in-office procedure.  The indications, risks, benefits and alternatives of excision of this mass were discussed with Margot Kendrick. The risks discussed included but were not limited to bleeding, infection, seroma, need for additional treatment, nontherapeutic intervention, wound complication (such as dehiscence), and rare complications related to surgery and/or anesthesia such as venous thromboembolism and cardiorespiratory " complications.  All of her questions were answered thoroughly and to her satisfaction, and informed consent was obtained. Consent was verified via teach back methodology.        Subhash Knox DO on 10/3/2024 at 8:07 AM      Again, thank you for allowing me to participate in the care of your patient.        Sincerely,        Subhash Knox DO

## 2024-10-10 ENCOUNTER — TRANSFERRED RECORDS (OUTPATIENT)
Dept: HEALTH INFORMATION MANAGEMENT | Facility: CLINIC | Age: 59
End: 2024-10-10

## 2024-10-10 ENCOUNTER — OFFICE VISIT (OUTPATIENT)
Dept: SURGERY | Facility: CLINIC | Age: 59
End: 2024-10-10
Payer: COMMERCIAL

## 2024-10-10 VITALS
OXYGEN SATURATION: 98 % | BODY MASS INDEX: 25.83 KG/M2 | WEIGHT: 155 LBS | SYSTOLIC BLOOD PRESSURE: 118 MMHG | HEIGHT: 65 IN | HEART RATE: 87 BPM | DIASTOLIC BLOOD PRESSURE: 72 MMHG

## 2024-10-10 DIAGNOSIS — D17.1 LIPOMA OF BACK: Primary | ICD-10-CM

## 2024-10-10 PROCEDURE — 88305 TISSUE EXAM BY PATHOLOGIST: CPT | Performed by: PATHOLOGY

## 2024-10-10 PROCEDURE — 21932 EXC BACK TUM DEEP < 5 CM: CPT | Performed by: SURGERY

## 2024-10-10 ASSESSMENT — PAIN SCALES - GENERAL: PAINLEVEL: NO PAIN (0)

## 2024-10-10 NOTE — PROGRESS NOTES
Procedure Date: 10/10/24     PREOPERATIVE DIAGNOSIS: Back lipoma  POSTOPERATIVE DIAGNOSIS: Same     PROCEDURE: 1. Excision of subfascial back lipoma     ATTENDING SURGEON: Subhash Knox DO    ESTIMATED BLOOD LOSS: minimal    ANESTHESIA: Local Anesthesia     INDICATIONS FOR PROCEDURE: : Margot Kendrick presents with a history of an enlarging, tender left upper back mass. After discussion was held with the patient regarding indications, risks, benefits and alternatives, benefits, and risks, her questions were addressed and she understood and wished to proceed. Specific risks discussed included bleeding, infection, seroma, need for additional treatment, nontherapeutic intervention, wound complication (such as dehiscence), recurrence, and rare complications related to surgery and/or anesthesia such as venous thromboembolism and cardiorespiratory complications.     PROCEDURE: After informed consent was obtained, the patient was brought to the procedure room and placed in the prone position.  The surgical site was then prepped and draped in the typical sterile fashion. A time-out was performed to verify patient and procedure.      Local anesthetic was delivered, and an incision was carried out over the mass. The surrounding tissue was carefully dissected to free the mass, to a width of 3.0 cm, length of 2.5 cm, and subcutaneous depth of 1.0 cm.  The mass was located subfascial, atop the paraspinal muscles.  Once excised this was passed off the field and sent routine to pathology. Hemostasis was assured.      The subcutaneous tissue was closed in layers using interrupted 3-0 Vicryl suture, and skin was closed using a subcuticular suture of 4-0 Monocryl. The skin was cleansed and dried, before administration of Dermabond glue, set to function as a sterile bandage.      The patient tolerated the procedure well.    Subhash Knox DO on 10/10/2024 at 3:39 PM

## 2024-10-10 NOTE — LETTER
10/10/2024      Margot Kendrick  [address] [address] [address]      Dear Colleague,    Thank you for referring your patient, Margot Kendrick, to the [facility]. Please see a copy of my visit note below.    Procedure Date: 10/10/24     PREOPERATIVE DIAGNOSIS: Back lipoma  POSTOPERATIVE DIAGNOSIS: Same     PROCEDURE: 1. Excision of subfascial back lipoma     ATTENDING SURGEON: Subhash Knox DO    ESTIMATED BLOOD LOSS: minimal    ANESTHESIA: Local Anesthesia     INDICATIONS FOR PROCEDURE: : Margot Kendrick presents with a history of an enlarging, tender left upper back mass. After discussion was held with the patient regarding indications, risks, benefits and alternatives, benefits, and risks, her questions were addressed and she understood and wished to proceed. Specific risks discussed included bleeding, infection, seroma, need for additional treatment, nontherapeutic intervention, wound complication (such as dehiscence), recurrence, and rare complications related to surgery and/or anesthesia such as venous thromboembolism and cardiorespiratory complications.     PROCEDURE: After informed consent was obtained, the patient was brought to the procedure room and placed in the prone position.  The surgical site was then prepped and draped in the typical sterile fashion. A time-out was performed to verify patient and procedure.      Local anesthetic was delivered, and an incision was carried out over the mass. The surrounding tissue was carefully dissected to free the mass, to a width of 3.0 cm, length of 2.5 cm, and subcutaneous depth of 1.0 cm.  The mass was located subfascial, atop the paraspinal muscles.  Once excised this was passed off the field and sent routine to pathology. Hemostasis was assured.      The subcutaneous tissue was closed in layers using interrupted 3-0 Vicryl suture, and skin was closed using a subcuticular suture of 4-0 Monocryl.  The skin was cleansed and dried, before administration of Dermabond glue, set to function as a sterile bandage.      The patient tolerated the procedure well.    Subhash Knox DO on 10/10/2024 at 3:39 PM      Again, thank you for allowing me to participate in the care of your patient.        Sincerely,        Subhash Knox DO

## 2024-10-12 ENCOUNTER — HEALTH MAINTENANCE LETTER (OUTPATIENT)
Age: 59
End: 2024-10-12

## 2024-10-14 ENCOUNTER — MYC MEDICAL ADVICE (OUTPATIENT)
Dept: FAMILY MEDICINE | Facility: CLINIC | Age: 59
End: 2024-10-14
Payer: COMMERCIAL

## 2024-10-14 DIAGNOSIS — E11.9 TYPE 2 DIABETES MELLITUS WITHOUT COMPLICATION, WITHOUT LONG-TERM CURRENT USE OF INSULIN (H): Primary | ICD-10-CM

## 2024-10-14 LAB
PATH REPORT.COMMENTS IMP SPEC: NORMAL
PATH REPORT.COMMENTS IMP SPEC: NORMAL
PATH REPORT.FINAL DX SPEC: NORMAL
PATH REPORT.GROSS SPEC: NORMAL
PATH REPORT.MICROSCOPIC SPEC OTHER STN: NORMAL
PATH REPORT.RELEVANT HX SPEC: NORMAL
PHOTO IMAGE: NORMAL

## 2024-11-15 ENCOUNTER — TRANSFERRED RECORDS (OUTPATIENT)
Dept: MULTI SPECIALTY CLINIC | Facility: CLINIC | Age: 59
End: 2024-11-15
Payer: COMMERCIAL

## 2024-11-15 LAB — RETINOPATHY: NORMAL

## 2024-12-04 ENCOUNTER — VIRTUAL VISIT (OUTPATIENT)
Dept: FAMILY MEDICINE | Facility: CLINIC | Age: 59
End: 2024-12-04
Payer: COMMERCIAL

## 2024-12-04 DIAGNOSIS — K76.0 FATTY LIVER: ICD-10-CM

## 2024-12-04 DIAGNOSIS — M54.6 CHRONIC BILATERAL THORACIC BACK PAIN: ICD-10-CM

## 2024-12-04 DIAGNOSIS — G89.29 CHRONIC BILATERAL THORACIC BACK PAIN: ICD-10-CM

## 2024-12-04 DIAGNOSIS — E11.9 TYPE 2 DIABETES MELLITUS WITHOUT COMPLICATION, WITHOUT LONG-TERM CURRENT USE OF INSULIN (H): Primary | ICD-10-CM

## 2024-12-04 PROCEDURE — 99214 OFFICE O/P EST MOD 30 MIN: CPT | Mod: 95 | Performed by: FAMILY MEDICINE

## 2024-12-04 PROCEDURE — G2211 COMPLEX E/M VISIT ADD ON: HCPCS | Mod: 95 | Performed by: FAMILY MEDICINE

## 2024-12-04 RX ORDER — HYDROCHLOROTHIAZIDE 12.5 MG/1
CAPSULE ORAL
Qty: 6 EACH | Refills: 3 | Status: SHIPPED | OUTPATIENT
Start: 2024-12-04

## 2024-12-04 RX ORDER — KETOROLAC TROMETHAMINE 30 MG/ML
1 INJECTION, SOLUTION INTRAMUSCULAR; INTRAVENOUS ONCE
Qty: 1 EACH | Refills: 0 | Status: CANCELLED | OUTPATIENT
Start: 2024-12-04 | End: 2024-12-04

## 2024-12-04 RX ORDER — TIRZEPATIDE 5 MG/.5ML
5 INJECTION, SOLUTION SUBCUTANEOUS WEEKLY
Qty: 2 ML | Refills: 5 | Status: SHIPPED | OUTPATIENT
Start: 2024-12-04

## 2024-12-04 RX ORDER — ATORVASTATIN CALCIUM 20 MG/1
20 TABLET, FILM COATED ORAL DAILY
COMMUNITY
Start: 2024-12-04

## 2024-12-04 ASSESSMENT — ENCOUNTER SYMPTOMS: BACK PAIN: 1

## 2024-12-04 NOTE — PROGRESS NOTES
"Margot is a 59 year old who is being evaluated via a billable video visit.    How would you like to obtain your AVS? Fish  If the video visit is dropped, the invitation should be resent by: Fish  Will anyone else be joining your video visit? No      Assessment & Plan     Type 2 diabetes mellitus without complication, without long-term current use of insulin (H)  Well controlled   Will continue on current regimen   She will restart lipitor   - Continuous Glucose Sensor (FREESTYLE SHERRI 3 PLUS SENSOR) MISC; Use 1 sensor every 15 days. Use to read blood sugars per 's instructions.  - MOUNJARO 5 MG/0.5ML SOAJ; Inject 0.5 mLs (5 mg) subcutaneously once a week.  - Lipid panel reflex to direct LDL Fasting; Future  - atorvastatin (LIPITOR) 20 MG tablet; Take 1 tablet (20 mg) by mouth daily.    Chronic bilateral thoracic back pain  Mechanical   Trial of PT   - Physical Therapy  Referral; Future    Fatty liver  Due for LFTs   Has had GI consult this is reviewed   - Hepatic panel (Albumin, ALT, AST, Bili, Alk Phos, TP); Future    The longitudinal plan of care for the diagnosis(es)/condition(s) as documented were addressed during this visit. Due to the added complexity in care, I will continue to support Margot in the subsequent management and with ongoing continuity of care.        BMI  Estimated body mass index is 25.99 kg/m  as calculated from the following:    Height as of 10/10/24: 1.645 m (5' 4.75\").    Weight as of 10/10/24: 70.3 kg (155 lb).             Subjective   Margot is a 59 year old, presenting for the following health issues:  Diabetes and Back Pain        12/4/2024     8:30 AM   Additional Questions   Roomed by Juany TREJO   Accompanied by Self         12/4/2024     8:30 AM   Patient Reported Additional Medications   Patient reports taking the following new medications .     Via the Health Maintenance questionnaire, the patient has reported the following services have been completed -Eye Exam: " [facility] 2024-11-15, this information has been sent to the abstraction team.  Via the Health Maintenance questionnaire, the patient has reported the following services have been completed -Mammogram: [facility] 2022-05-20, this information has not been sent to the abstraction team.  Back Pain     History of Present Illness       Back Pain:  She presents for follow up of back pain. Patient's back pain is a chronic problem.  Location of back pain:  Right middle of back  Description of back pain: burning, cramping and gnawing  Back pain spreads: nowhere    Since patient first noticed back pain, pain is: always present, but gets better and worse  Does back pain interfere with her job:  No       Diabetes:   She presents for follow up of diabetes.   She is checking home blood glucose with a continuous glucose monitor.   She checks blood glucose before and after meals and at bedtime.  Blood glucose is sometimes over 200 and sometimes under 70. She is aware of hypoglycemia symptoms including shakiness, weakness and lethargy.    She has no concerns regarding her diabetes at this time.   She is not experiencing numbness or burning in feet, excessive thirst, blurry vision, weight changes or redness, sores or blisters on feet. The patient has had a diabetic eye exam in the last 12 months. Eye exam performed on 11/15/2024. Location of last eye exam [facility].        She eats 0-1 servings of fruits and vegetables daily.She consumes 0 sweetened beverage(s) daily.She exercises with enough effort to increase her heart rate 10 to 19 minutes per day.  She exercises with enough effort to increase her heart rate 7 days per week.   She is taking medications regularly.          Would like to switch Nieves 3 plus - cost is much cheaper      Weight today 141lbs    Wt Readings from Last 4 Encounters:   10/10/24 70.3 kg (155 lb)   10/03/24 70.3 kg (155 lb)   09/18/24 70.3 kg (155 lb)   06/21/24 81.2 kg (179 lb)          Fatty liver   Has had complete GI eval   Fibro scan in 3 years         Review of Systems  Constitutional, HEENT, cardiovascular, pulmonary, gi and gu systems are negative, except as otherwise noted.      Objective    Vitals - Patient Reported  Weight (Patient Reported): 64 kg (141 lb)      Vitals:  No vitals were obtained today due to virtual visit.    Physical Exam   GENERAL: alert and no distress  EYES: Eyes grossly normal to inspection.  No discharge or erythema, or obvious scleral/conjunctival abnormalities.  RESP: No audible wheeze, cough, or visible cyanosis.    SKIN: Visible skin clear. No significant rash, abnormal pigmentation or lesions.  NEURO: Cranial nerves grossly intact.  Mentation and speech appropriate for age.  PSYCH: Appropriate affect, tone, and pace of words          Video-Visit Details    Type of service:  Video Visit   Originating Location (pt. Location): Home    Distant Location (provider location):  On-site  Platform used for Video Visit: Tuan  Signed Electronically by: Lakeisha Haley MD

## 2024-12-14 ENCOUNTER — LAB (OUTPATIENT)
Dept: LAB | Facility: CLINIC | Age: 59
End: 2024-12-14
Attending: FAMILY MEDICINE
Payer: COMMERCIAL

## 2024-12-14 DIAGNOSIS — K76.0 FATTY LIVER: ICD-10-CM

## 2024-12-14 DIAGNOSIS — E11.9 TYPE 2 DIABETES MELLITUS WITHOUT COMPLICATION, WITHOUT LONG-TERM CURRENT USE OF INSULIN (H): Primary | ICD-10-CM

## 2024-12-14 LAB
ALBUMIN SERPL BCG-MCNC: 4.4 G/DL (ref 3.5–5.2)
ALP SERPL-CCNC: 39 U/L (ref 40–150)
ALT SERPL W P-5'-P-CCNC: 40 U/L (ref 0–50)
AST SERPL W P-5'-P-CCNC: 32 U/L (ref 0–45)
BILIRUB DIRECT SERPL-MCNC: <0.2 MG/DL (ref 0–0.3)
BILIRUB SERPL-MCNC: 0.9 MG/DL
CHOLEST SERPL-MCNC: 144 MG/DL
EST. AVERAGE GLUCOSE BLD GHB EST-MCNC: 103 MG/DL
FASTING STATUS PATIENT QL REPORTED: NO
HBA1C MFR BLD: 5.2 % (ref 0–5.6)
HDLC SERPL-MCNC: 36 MG/DL
LDLC SERPL CALC-MCNC: 83 MG/DL
NONHDLC SERPL-MCNC: 108 MG/DL
PROT SERPL-MCNC: 6.9 G/DL (ref 6.4–8.3)
TRIGL SERPL-MCNC: 126 MG/DL

## 2024-12-14 PROCEDURE — 80076 HEPATIC FUNCTION PANEL: CPT

## 2024-12-14 PROCEDURE — 80061 LIPID PANEL: CPT

## 2024-12-14 PROCEDURE — 83036 HEMOGLOBIN GLYCOSYLATED A1C: CPT

## 2024-12-14 PROCEDURE — 36415 COLL VENOUS BLD VENIPUNCTURE: CPT

## 2024-12-17 ENCOUNTER — PATIENT OUTREACH (OUTPATIENT)
Dept: CARE COORDINATION | Facility: CLINIC | Age: 59
End: 2024-12-17
Payer: COMMERCIAL

## 2024-12-18 ENCOUNTER — THERAPY VISIT (OUTPATIENT)
Dept: PHYSICAL THERAPY | Facility: CLINIC | Age: 59
End: 2024-12-18
Attending: FAMILY MEDICINE
Payer: COMMERCIAL

## 2024-12-18 DIAGNOSIS — G89.29 CHRONIC BILATERAL THORACIC BACK PAIN: ICD-10-CM

## 2024-12-18 DIAGNOSIS — M54.6 CHRONIC BILATERAL THORACIC BACK PAIN: ICD-10-CM

## 2024-12-18 PROCEDURE — 97161 PT EVAL LOW COMPLEX 20 MIN: CPT | Mod: GP | Performed by: PHYSICAL MEDICINE & REHABILITATION

## 2024-12-18 PROCEDURE — 97110 THERAPEUTIC EXERCISES: CPT | Mod: GP | Performed by: PHYSICAL MEDICINE & REHABILITATION

## 2024-12-18 NOTE — PROGRESS NOTES
"PHYSICAL THERAPY EVALUATION  Type of Visit: Evaluation     Fall Risk Screen:  Fall screen completed by: PT  Have you fallen 2 or more times in the past year?: No  Have you fallen and had an injury in the past year?: No  Is patient a fall risk?: No    Subjective       Presenting condition or subjective complaint: (Patient-Rptd) Pain in the left side of my middle back. Per MD, \"back pain that has been a chronic problem at the right middle of back described at burning, cramping, and gnawing. Denies radiating pain, constant pain that gets better and worse.\" Had a lipoma removed on her left upper back about 9-10 weeks ago. Pain has progressed to both sides in which increases with reaching and bending forward feeling like \"ribs are rubbing on each other.\" Above bra line pain with activity and lower rib pain with turning/reaching.    Date of onset: 12/04/24    Relevant medical history:     Dates & types of surgery:      Prior diagnostic imaging/testing results: (Patient-Rptd) Other (Patient-Rptd) Ultrasound, lipoma removed.   Prior therapy history for the same diagnosis, illness or injury: (Patient-Rptd) No      Living Environment  Social support: (Patient-Rptd) With a significant other or spouse   Type of home: (Patient-Rptd) House   Stairs to enter the home: (Patient-Rptd) Yes (Patient-Rptd) 5 Is there a railing: (Patient-Rptd) Yes     Ramp: (Patient-Rptd) No   Stairs inside the home: (Patient-Rptd) Yes (Patient-Rptd) 14 Is there a railing: (Patient-Rptd) Yes     Help at home: (Patient-Rptd) Home management tasks (cooking, cleaning); Home and Yard maintenance tasks  Equipment owned:       Employment: (Patient-Rptd) Yes (Patient-Rptd)   Hobbies/Interests: (Patient-Rptd) Camping, fishing, shopping, baking    Patient goals for therapy: (Patient-Rptd) I can do most anything, for just a short time. Cooking, cleaning mostly aggrevates the pain.     Objective   THORACIC SPINE EVALUATION  PAIN: Pain Level at Rest: " 3/10  Pain Level with Use: 8/10  Pain Location: thoracic spine  Pain Quality: Aching, Dull, Sharp, and Stabbing  Pain Frequency: intermittent or constant  Pain is Worst: daytime  Pain is Exacerbated By: side bending, reaching, bending  Pain is Relieved By: rest, stretch, and topical creams  INTEGUMENTARY (edema, incisions): WFL  POSTURE: Standing Posture: Rounded shoulders, Forward head, Thoracic kyphosis increased  GAIT:   Weightbearing Status: WBAT  Assistive Device(s): None  Gait Deviations: WFL  ROM: AROM WFL  25% limited thoracic side bending bilaterally  MYOTOMES: WNL  DERMATOMES: WNL  NEURAL TENSION: Thoracic WNL  Cervical WNL  FLEXIBILITY: Decreased upper trap L, Decreased levator L, Decreased upper trap R, Decreased levator R   PALPATION:  T4-10 bilateral paraspinals    Assessment & Plan   CLINICAL IMPRESSIONS  Medical Diagnosis: Chronic bilateral thoracic back pain (M54.6, G89.29)    Treatment Diagnosis: thoracic back pain   Impression/Assessment: Patient is a 59 year old female with thoracic pain complaints.  The following significant findings have been identified: Pain, Impaired muscle performance, Decreased activity tolerance, and Impaired posture. These impairments interfere with their ability to perform recreational activities, household chores, and community mobility as compared to previous level of function.     Clinical Decision Making (Complexity):  Clinical Presentation: Stable/Uncomplicated  Clinical Presentation Rationale: based on medical and personal factors listed in PT evaluation  Clinical Decision Making (Complexity): Low complexity    PLAN OF CARE  Treatment Interventions:  Interventions: Manual Therapy, Neuromuscular Re-education, Therapeutic Activity, Therapeutic Exercise    Long Term Goals     PT Goal 1  Goal Identifier: STG  Goal Description: Patient to report cooking/cleaning for more than 20 minutes with <4/10 pain.  Rationale: to maximize safety and independence with performance of  ADLs and functional tasks  Target Date: 01/22/25  PT Goal 2  Goal Identifier: STG  Goal Description: Patient to report ability to reach forward and side bend to  items from ground with <3/10 pain.  Rationale: to maximize safety and independence with performance of ADLs and functional tasks  Target Date: 01/22/25  PT Goal 3  Goal Identifier: LTG  Goal Description: Patient to report ability to perform all household chores/duties without limitations secondary to thoracic pain/weakness.  Rationale: to maximize safety and independence with performance of ADLs and functional tasks  Target Date: 02/26/25  PT Goal 4  Goal Identifier: LTG  Goal Description: Patient to be IND with HEP to aid functional recovery and reduce future occurence of pain and/or disability.  Rationale: to maximize safety and independence with performance of ADLs and functional tasks  Target Date: 02/26/25      Frequency of Treatment: 1x/week  Duration of Treatment: 10 weeks    Education Assessment:   Learner/Method: Patient;No Barriers to Learning;Demonstration;Pictures/Video    Risks and benefits of evaluation/treatment have been explained.   Patient/Family/caregiver agrees with Plan of Care.     Evaluation Time:     PT Eval, Low Complexity Minutes (83349): 14     Signing Clinician: Magno Acosta, PT

## 2024-12-19 ENCOUNTER — PATIENT OUTREACH (OUTPATIENT)
Dept: CARE COORDINATION | Facility: CLINIC | Age: 59
End: 2024-12-19
Payer: COMMERCIAL

## 2024-12-26 ENCOUNTER — THERAPY VISIT (OUTPATIENT)
Dept: PHYSICAL THERAPY | Facility: CLINIC | Age: 59
End: 2024-12-26
Attending: FAMILY MEDICINE
Payer: COMMERCIAL

## 2024-12-26 DIAGNOSIS — M54.6 CHRONIC BILATERAL THORACIC BACK PAIN: Primary | ICD-10-CM

## 2024-12-26 DIAGNOSIS — G89.29 CHRONIC BILATERAL THORACIC BACK PAIN: Primary | ICD-10-CM

## 2024-12-26 PROCEDURE — 97110 THERAPEUTIC EXERCISES: CPT | Mod: GP | Performed by: PHYSICAL MEDICINE & REHABILITATION

## 2024-12-27 ENCOUNTER — MYC MEDICAL ADVICE (OUTPATIENT)
Dept: FAMILY MEDICINE | Facility: CLINIC | Age: 59
End: 2024-12-27
Payer: COMMERCIAL

## 2024-12-27 DIAGNOSIS — R07.89 CHEST WALL PAIN: ICD-10-CM

## 2024-12-27 NOTE — TELEPHONE ENCOUNTER
Ok to wait for provider return on 12/30/24.    Pls see [employer] message from 12/27/24.       Outpatient Medication Detail     Disp Refills Start End LOLIS   tiZANidine (ZANAFLEX) 4 MG tablet (Discontinued) 30 tablet 0 8/4/2021 12/14/2022 --   Sig - Route: Take 1 tablet (4 mg) by mouth 3 times daily - Oral   Sent to pharmacy as: tiZANidine HCl 4 MG Oral Tablet (ZANAFLEX)   Class: E-Prescribe   Order: [id number]   E-Prescribing Status: Receipt confirmed by pharmacy (8/4/2021 12:58 PM CDT)     Printout Tracking    External Result Report     Pharmacy    [place], [place] - [address] [place] [address]   Date of last VV: 12/4/24, OV 6/21/24.  RX pended, message routed to PCP to review upon her return on 12/30/24.    Julie Behrendt RN

## 2025-01-09 ENCOUNTER — THERAPY VISIT (OUTPATIENT)
Dept: PHYSICAL THERAPY | Facility: CLINIC | Age: 60
End: 2025-01-09
Attending: FAMILY MEDICINE
Payer: COMMERCIAL

## 2025-01-09 DIAGNOSIS — M54.6 CHRONIC BILATERAL THORACIC BACK PAIN: Primary | ICD-10-CM

## 2025-01-09 DIAGNOSIS — G89.29 CHRONIC BILATERAL THORACIC BACK PAIN: Primary | ICD-10-CM

## 2025-01-09 PROCEDURE — 97140 MANUAL THERAPY 1/> REGIONS: CPT | Mod: GP | Performed by: PHYSICAL MEDICINE & REHABILITATION

## 2025-01-09 PROCEDURE — 97110 THERAPEUTIC EXERCISES: CPT | Mod: GP | Performed by: PHYSICAL MEDICINE & REHABILITATION

## 2025-01-23 ENCOUNTER — THERAPY VISIT (OUTPATIENT)
Dept: PHYSICAL THERAPY | Facility: CLINIC | Age: 60
End: 2025-01-23
Attending: FAMILY MEDICINE
Payer: COMMERCIAL

## 2025-01-23 DIAGNOSIS — G89.29 CHRONIC BILATERAL THORACIC BACK PAIN: Primary | ICD-10-CM

## 2025-01-23 DIAGNOSIS — M54.6 CHRONIC BILATERAL THORACIC BACK PAIN: Primary | ICD-10-CM

## 2025-01-23 PROCEDURE — 97140 MANUAL THERAPY 1/> REGIONS: CPT | Mod: GP | Performed by: PHYSICAL MEDICINE & REHABILITATION

## 2025-01-23 PROCEDURE — 97110 THERAPEUTIC EXERCISES: CPT | Mod: GP | Performed by: PHYSICAL MEDICINE & REHABILITATION

## 2025-01-30 ENCOUNTER — THERAPY VISIT (OUTPATIENT)
Dept: PHYSICAL THERAPY | Facility: CLINIC | Age: 60
End: 2025-01-30
Attending: FAMILY MEDICINE
Payer: COMMERCIAL

## 2025-01-30 DIAGNOSIS — G89.29 CHRONIC BILATERAL THORACIC BACK PAIN: Primary | ICD-10-CM

## 2025-01-30 DIAGNOSIS — M54.6 CHRONIC BILATERAL THORACIC BACK PAIN: Primary | ICD-10-CM

## 2025-01-30 PROCEDURE — 97110 THERAPEUTIC EXERCISES: CPT | Mod: GP | Performed by: PHYSICAL MEDICINE & REHABILITATION

## 2025-02-05 ENCOUNTER — VIRTUAL VISIT (OUTPATIENT)
Dept: FAMILY MEDICINE | Facility: CLINIC | Age: 60
End: 2025-02-05
Payer: COMMERCIAL

## 2025-02-05 DIAGNOSIS — N95.1 MENOPAUSAL SYNDROME (HOT FLASHES): Primary | ICD-10-CM

## 2025-02-05 DIAGNOSIS — E11.9 TYPE 2 DIABETES MELLITUS WITHOUT COMPLICATION, WITHOUT LONG-TERM CURRENT USE OF INSULIN (H): ICD-10-CM

## 2025-02-05 PROCEDURE — 98006 SYNCH AUDIO-VIDEO EST MOD 30: CPT | Performed by: FAMILY MEDICINE

## 2025-02-05 RX ORDER — VENLAFAXINE HYDROCHLORIDE 37.5 MG/1
37.5 CAPSULE, EXTENDED RELEASE ORAL DAILY
Qty: 90 CAPSULE | Refills: 3 | Status: SHIPPED | OUTPATIENT
Start: 2025-02-05

## 2025-02-05 NOTE — PROGRESS NOTES
"Margto is a 59 year old who is being evaluated via a billable video visit.    How would you like to obtain your AVS? MyChart  If the video visit is dropped, the invitation should be resent by: Text to cell phone: [phone]  Will anyone else be joining your video visit? No      Assessment & Plan     Type 2 diabetes mellitus without complication, without long-term current use of insulin (H)  Stable no change in treatment plan.     Menopausal syndrome (hot flashes)  Trial of the below   Not a candidate for HRT due to migraines   - venlafaxine (EFFEXOR XR) 37.5 MG 24 hr capsule; Take 1 capsule (37.5 mg) by mouth daily.    The longitudinal plan of care for the diagnosis(es)/condition(s) as documented were addressed during this visit. Due to the added complexity in care, I will continue to support Margot in the subsequent management and with ongoing continuity of care.        BMI  Estimated body mass index is 25.99 kg/m  as calculated from the following:    Height as of 10/10/24: 1.645 m (5' 4.75\").    Weight as of 10/10/24: 70.3 kg (155 lb).             Subjective   Margot is a 59 year old, presenting for the following health issues:  Menopausal Sx        2/5/2025     1:50 PM   Additional Questions   Roomed by Juany TREJO   Accompanied by Self         2/5/2025     1:50 PM   Patient Reported Additional Medications   Patient reports taking the following new medications .     History of Present Illness       Reason for visit:  Menopause Symptoms    She eats 2-3 servings of fruits and vegetables daily.She consumes 0 sweetened beverage(s) daily.She exercises with enough effort to increase her heart rate 10 to 19 minutes per day.  She exercises with enough effort to increase her heart rate 7 days per week.   She is taking medications regularly.      Menopausal Hot flashes   Hot flashes - occ. During the day  A lot at night  Average blood sugar 101  Night sweats      Very poor sleep   Getting wrose   Has tried some over the counter " options                                                                                                                              Diabetes Follow-up    How often are you checking your blood sugar? Continuous glucose monitor  What time of day are you checking your blood sugars (select all that apply)?  Before and after meals  Have you had any blood sugars above 200?  No  Have you had any blood sugars below 70?  No  What symptoms do you notice when your blood sugar is low?  Shaky  What concerns do you have today about your diabetes? None   Do you have any of these symptoms? (Select all that apply)  No numbness or tingling in feet.  No redness, sores or blisters on feet.  No complaints of excessive thirst.  No reports of blurry vision.  No significant changes to weight.      BP Readings from Last 2 Encounters:   10/10/24 118/72   10/03/24 123/82     Hemoglobin A1C (%)   Date Value   12/14/2024 5.2   09/18/2024 5.4     LDL Cholesterol Calculated (mg/dL)   Date Value   12/14/2024 83   09/18/2024 126 (H)   05/12/2021 132 (H)   11/28/2016 132 (H)                 Review of Systems  Constitutional, HEENT, cardiovascular, pulmonary, gi and gu systems are negative, except as otherwise noted.      Objective           Vitals:  No vitals were obtained today due to virtual visit.    Physical Exam   GENERAL: alert and no distress  EYES: Eyes grossly normal to inspection.  No discharge or erythema, or obvious scleral/conjunctival abnormalities.  RESP: No audible wheeze, cough, or visible cyanosis.    SKIN: Visible skin clear. No significant rash, abnormal pigmentation or lesions.  NEURO: Cranial nerves grossly intact.  Mentation and speech appropriate for age.  PSYCH: Appropriate affect, tone, and pace of words          Video-Visit Details    Type of service:  Video Visit   Originating Location (pt. Location): Home    Distant Location (provider location):  On-site  Platform used for Video Visit: Tuan  Signed Electronically by:  Lakeisha Haley MD     Detail Level: Simple Initiate Treatment: Apply efudex and tazorac twice daily to both legs

## 2025-02-07 ENCOUNTER — OFFICE VISIT (OUTPATIENT)
Dept: ORTHOPEDICS | Facility: CLINIC | Age: 60
End: 2025-02-07
Payer: COMMERCIAL

## 2025-02-07 VITALS — WEIGHT: 145 LBS | BODY MASS INDEX: 24.32 KG/M2

## 2025-02-07 DIAGNOSIS — M75.32 CALCIFIC TENDINITIS OF LEFT SHOULDER: ICD-10-CM

## 2025-02-07 DIAGNOSIS — Z98.890 HX OF SHOULDER SURGERY: ICD-10-CM

## 2025-02-07 DIAGNOSIS — M25.512 ACUTE PAIN OF LEFT SHOULDER: Primary | ICD-10-CM

## 2025-02-07 DIAGNOSIS — M75.82 ROTATOR CUFF TENDINITIS, LEFT: ICD-10-CM

## 2025-02-07 PROCEDURE — 99213 OFFICE O/P EST LOW 20 MIN: CPT | Mod: 25 | Performed by: FAMILY MEDICINE

## 2025-02-07 PROCEDURE — 20610 DRAIN/INJ JOINT/BURSA W/O US: CPT | Mod: LT | Performed by: FAMILY MEDICINE

## 2025-02-07 RX ADMIN — ROPIVACAINE HYDROCHLORIDE 3 ML: 5 INJECTION, SOLUTION EPIDURAL; INFILTRATION; PERINEURAL at 15:28

## 2025-02-07 RX ADMIN — TRIAMCINOLONE ACETONIDE 40 MG: 40 INJECTION, SUSPENSION INTRA-ARTICULAR; INTRAMUSCULAR at 15:28

## 2025-02-07 NOTE — LETTER
"2025      Margot Kendrick  [address] [place] [address]      Dear Colleague,    Thank you for referring your patient, Margot Kendrick, to the [facility]. Please see a copy of my visit note below.    Margot Kendrick  :  1965  DOS: 2025  MRN: [id number]    Sports Medicine Clinic Visit    PCP: Lakeisha Haley    Margot Kendrick is a 58 year old Right hand dominant female who is seen as a self referral presenting with left shoulder pain.    Injury: Gradual onset of pain over the past 6 month(s).  Pain located over left shoulder, nonradiating.  Reports constant diffuse pain around the shoulder. No additional sx.  Symptoms are better with Home Exercises, arthritic medicines reported by patient (mobic).  Symptoms are worse with: extension, flexion, and reaching.  Other evaluation and/or treatments so far consists of: Nothing, notes restriction to Tylenol, no other medicines due to medical concerns.  Recent imaging completed: No recent imaging completed.  Prior History of related problems: Clavicle resection of both sides in  or .    Social History:      Interim History - 2024  Since last visit on 2024 patient has moderate left lateral shoulder & periscapular pain.  Left shoulder glenohumeral joint injection completed on 24 provided relief for ~ 2 weeks.  Patient noted she felt \"pulling, tear\" sensation in lateral shoulder, while removing compression stocking ~ 2+ months ago.  Also noting palpable/painful soft tissue lump in left medial scapular border for the past 2 weeks that is worse with standing over sink & reaching.  No new injury in the interim.    Interim History - 2025  Since last visit on 2024 patient states that she started PT for her middle back and both ribs, made an increase in left shoulder pain. Left subacromial injection completed on " 2024 provided  relief up until 1.5 weeks ago. No new injury in the interim.      Review of Systems  Musculoskeletal: as above  Remainder of review of systems is negative including constitutional, CV, pulmonary, GI, Skin and Neurologic except as noted in HPI or medical history.    Past Medical History:   Diagnosis Date     Arthritis      Esophageal reflux     GERD     Migraine, unspecified, without mention of intractable migraine without mention of status migrainosus      Mitral valve disorders(424.0)     thought possible MVP, not documented     Myalgia and myositis, unspecified     Fibromyalgia     Type 2 diabetes mellitus without complication, without long-term current use of insulin (H) 2024     Past Surgical History:   Procedure Laterality Date     ABDOMEN SURGERY  2005    Right Sanjiv-Collectomy     APPENDECTOMY  2005    With collectomy     COLONOSCOPY       COLONOSCOPY  2012    Procedure: COLONOSCOPY;  Colonoscopy;  Surgeon: Jun Reddy MD;  Location: [facility]     COLONOSCOPY N/A 02/15/2016    Procedure: COLONOSCOPY;  Surgeon: Anson Sethi MD;  Location: [facility]     ENT SURGERY       ENT SURGERY  2000    Sinus     ESOPHAGOSCOPY, GASTROSCOPY, DUODENOSCOPY (EGD), COMBINED N/A 10/08/2015    Procedure: COMBINED ESOPHAGOSCOPY, GASTROSCOPY, DUODENOSCOPY (EGD), BIOPSY SINGLE OR MULTIPLE;  Surgeon: Anson Sethi MD;  Location: [facility]     EYE SURGERY  2015    Recurrent corneal erosion     HYSTERECTOMY, VAGINAL  2002     LAPAROSCOPIC CHOLECYSTECTOMY N/A 10/27/2015    Procedure: LAPAROSCOPIC CHOLECYSTECTOMY;  Surgeon: Anson Sethi MD;  Location: [facility]     ORTHOPEDIC SURGERY       ORTHOPEDIC SURGERY      Left shoulder/Right Shoulder/Right Wrist x 3     SURGICAL HISTORY OF -   ,,         SURGICAL HISTORY OF -   1993    Excision (L) ganglion cyst     SURGICAL HISTORY OF -   1993    Tubal ligation     SURGICAL HISTORY OF -    02/01/1998    Septoplasty/sinus surgery     SURGICAL HISTORY OF -   01/01/1999    TVH     SURGICAL HISTORY OF -   03/2004/ 03/15/2005    Colonoscopy     SURGICAL HISTORY OF -   07/31/2001    Gastroscopy     SURGICAL HISTORY OF -   08/01/1998    Septoplasty     SURGICAL HISTORY OF -   03/01/2005    rt craig colectomy     SURGICAL HISTORY OF -   12/01/2005    Open distal clavicle resection with subacromial decompression     SURGICAL HISTORY OF -   04/11/2008    rt wrist TFCC     SURGICAL HISTORY OF -   05/09/2008    rt wrist removal of scar tissue     SURGICAL HISTORY OF -   12/01/2009    Arthroscopic subacromial decompression with introduction of On-Q pain pump.      Family History   Problem Relation Age of Onset     C.A.D. Maternal Grandmother      Arthritis Maternal Grandmother         rheumatoid arthritis     Diabetes Maternal Grandmother      Cardiovascular Maternal Grandfather      Coronary Artery Disease Maternal Grandfather      Cardiovascular Paternal Grandmother      Cardiovascular Paternal Grandfather      Respiratory Daughter         ASTHMA     Neurologic Disorder Father         FIBROMYALGIA     Heart Disease Father 68     C.A.D. Father 68        MI     Osteoporosis Father      Neurologic Disorder Brother         FIBROMYALGIA     Neurologic Disorder Sister         FIBROMYALGIA     Anxiety Disorder Sister      Thyroid Disease Mother      Hypertension Mother      Hyperlipidemia Mother      Arthritis Maternal Aunt         psoriatic     Diabetes Other         Maternal Aunt     Coronary Artery Disease Other         Maternal Aunt     Hypertension Other         Maternal Aunt     Hyperlipidemia Other         Maternal Aunt     Melanoma No family hx of        Objective  Wt 65.8 kg (145 lb)   BMI 24.32 kg/m      General: healthy, alert and in no distress    HEENT: no scleral icterus or conjunctival erythema   Skin: no suspicious lesions or rash. No jaundice.   CV: regular rhythm by palpation, 2+ distal pulses, no  pedal edema    Resp: normal respiratory effort without conversational dyspnea   Psych: normal mood and affect    Gait: nonantalgic, appropriate coordination and balance   Neuro: normal light touch sensory exam of the extremities. Motor strength as noted below     Left Shoulder exam    ROM:        Mildly decreased terminal active and passive ROM with flexion > extension, abduction, internal and external rotation, also midrange pain with abduction and flexion actively    Tender:        Anterior and lateral shoulder       Anterior GH joint nontender    Non Tender:       remainder of shoulder       sternoclavicular joint       acromioclavicular joint       periscapular region    Strength:        abduction 4+/5       internal rotation 5/5       external rotation 4+/5       adduction 5/5    Impingement testing:       neg (-) Neer       Mildly painful Mata       painful empty can       neg (-) crossover       Minimal pain with crank    Stability testing:       neg (-) anterior glide       neg (-) sulcus sign    Skin:       no visible deformities       well perfused       capillary refill brisk    Sensation:        normal sensation over shoulder and upper extremity     Radiology  Recent Results (from the past 744 hour(s))   XR Shoulder Left G/E 3 Views    Narrative    XR SHOULDER LEFT G/E 3 VIEWS   5/17/2024 8:11 AM     HISTORY: Chronic left shoulder pain; Chronic left shoulder pain  COMPARISON: None.       Impression    IMPRESSION:     Views of the left shoulder are negative for acute fracture or  dislocation. No definite acromioclavicular glenohumeral joint space  narrowing. There is diffuse osseous demineralization.    ALTON PLEITEZ MD         SYSTEM ID:  [id number]       Assessment:  1. Acute pain of left shoulder    2. Rotator cuff tendinitis, left    3. Calcific tendinitis of left shoulder    4. Hx of shoulder surgery        Plan:  Discussed the assessment with the patient.  Follow up: prn based on short term  clinical progress  Acute flare of RTC pain >> GH joint pain on exam today, flared from starting PT, mild underlying DJD noted on XR, inferior glenoid osteophyte  Somewhat different from last visit where GH joint pain and CSI to GH joint was very helpful  Calcifications noted overlying supraspinatus, focal pain in this region consistent with flare of calcific tendinitis  PT recommended and available anytime specific to shoulder, declined for now, reviewed basic HEP and activity to avoid for spine PT  Could consider advanced imaging based on short term progress, defer for now, low suspicion for full-thickness RTC tear  US guided subacromial CSI today, reviewed diagnostic and therapeutic goals as well as relative risks and limitations  XR images independently visualized and reviewed with patient today in clinic  Oral Tylenol and topical Voltaren gel reviewed as safe OTC options, reviewed safe dosing strategies  Expectations and goals of CSI reviewed  Often 2-3 days for steroid effect, and can take up to two weeks for maximum effect  We discussed modified progressive pain-free activity as tolerated  Do not overuse in first two weeks if feeling better due to concern for vulnerability while steroid is working  Supportive care reviewed  All questions were answered today  Contact us with additional questions or concerns  Signs and sx of concern reviewed      Jorge Lacy DO, AMBREEN  Sports Medicine Physician  [facility]          Disclaimer: This note consists of symbols derived from keyboarding, dictation and/or voice recognition software. As a result, there may be errors in the script that have gone undetected. Please consider this when interpreting information found in this chart.    Large Joint Injection/Arthocentesis: L subacromial bursa    Date/Time: 2/7/2025 3:28 PM    Performed by: Jorge Lacy DO  Authorized by: Jorge Lacy DO    Indications:  Pain  Needle  Size:  25 G  Guidance: landmark guided    Approach:  Posterolateral  Location:  Shoulder      Site:  L subacromial bursa  Medications:  40 mg triamcinolone 40 MG/ML; 3 mL ROPivacaine 5 MG/ML  Outcome:  Tolerated well, no immediate complications  Procedure discussed: discussed risks, benefits, and alternatives    Consent Given by:  Patient  Timeout: timeout called immediately prior to procedure    Prep: patient was prepped and draped in usual sterile fashion     Ultrasound images of procedure were permanently stored.             Again, thank you for allowing me to participate in the care of your patient.        Sincerely,        Jorge Lacy, DO    Electronically signed

## 2025-02-07 NOTE — PROGRESS NOTES
"Margot Kendrick  :  1965  DOS: 2025  MRN: [id number]    Sports Medicine Clinic Visit    PCP: Lakeisha Haley    Margot Kendrick is a 58 year old Right hand dominant female who is seen as a self referral presenting with left shoulder pain.    Injury: Gradual onset of pain over the past 6 month(s).  Pain located over left shoulder, nonradiating.  Reports constant diffuse pain around the shoulder. No additional sx.  Symptoms are better with Home Exercises, arthritic medicines reported by patient (mobic).  Symptoms are worse with: extension, flexion, and reaching.  Other evaluation and/or treatments so far consists of: Nothing, notes restriction to Tylenol, no other medicines due to medical concerns.  Recent imaging completed: No recent imaging completed.  Prior History of related problems: Clavicle resection of both sides in  or .    Social History:      Interim History - 2024  Since last visit on 2024 patient has moderate left lateral shoulder & periscapular pain.  Left shoulder glenohumeral joint injection completed on 24 provided relief for ~ 2 weeks.  Patient noted she felt \"pulling, tear\" sensation in lateral shoulder, while removing compression stocking ~ 2+ months ago.  Also noting palpable/painful soft tissue lump in left medial scapular border for the past 2 weeks that is worse with standing over sink & reaching.  No new injury in the interim.    Interim History - 2025  Since last visit on 2024 patient states that she started PT for her middle back and both ribs, made an increase in left shoulder pain. Left subacromial injection completed on 2024 provided  relief up until 1.5 weeks ago. No new injury in the interim.      Review of Systems  Musculoskeletal: as above  Remainder of review of systems is negative including constitutional, CV, pulmonary, GI, Skin and Neurologic except as noted in HPI or " medical history.    Past Medical History:   Diagnosis Date    Arthritis     Esophageal reflux     GERD    Migraine, unspecified, without mention of intractable migraine without mention of status migrainosus     Mitral valve disorders(424.0)     thought possible MVP, not documented    Myalgia and myositis, unspecified     Fibromyalgia    Type 2 diabetes mellitus without complication, without long-term current use of insulin (H) 2024     Past Surgical History:   Procedure Laterality Date    ABDOMEN SURGERY  2005    Right Sanjiv-Collectomy    APPENDECTOMY  2005    With collectomy    COLONOSCOPY      COLONOSCOPY  2012    Procedure: COLONOSCOPY;  Colonoscopy;  Surgeon: Jun Reddy MD;  Location: [facility]    COLONOSCOPY N/A 02/15/2016    Procedure: COLONOSCOPY;  Surgeon: Anson Sethi MD;  Location: [facility]    ENT SURGERY      ENT SURGERY  2000    Sinus    ESOPHAGOSCOPY, GASTROSCOPY, DUODENOSCOPY (EGD), COMBINED N/A 10/08/2015    Procedure: COMBINED ESOPHAGOSCOPY, GASTROSCOPY, DUODENOSCOPY (EGD), BIOPSY SINGLE OR MULTIPLE;  Surgeon: Anson Sethi MD;  Location: [facility]    EYE SURGERY  2015    Recurrent corneal erosion    HYSTERECTOMY, VAGINAL  2002    LAPAROSCOPIC CHOLECYSTECTOMY N/A 10/27/2015    Procedure: LAPAROSCOPIC CHOLECYSTECTOMY;  Surgeon: Anson Sethi MD;  Location: [facility]    ORTHOPEDIC SURGERY      ORTHOPEDIC SURGERY      Left shoulder/Right Shoulder/Right Wrist x 3    SURGICAL HISTORY OF -   ,,        SURGICAL HISTORY OF -   1993    Excision (L) ganglion cyst    SURGICAL HISTORY OF -   1993    Tubal ligation    SURGICAL HISTORY OF -   1998    Septoplasty/sinus surgery    SURGICAL HISTORY OF -   1999    TVH    SURGICAL HISTORY OF -   2004/ /15/2005    Colonoscopy    SURGICAL HISTORY OF -   2001    Gastroscopy    SURGICAL HISTORY OF -   1998    Septoplasty    SURGICAL HISTORY OF -    03/01/2005    rt craig colectomy    SURGICAL HISTORY OF -   12/01/2005    Open distal clavicle resection with subacromial decompression    SURGICAL HISTORY OF -   04/11/2008    rt wrist TFCC    SURGICAL HISTORY OF -   05/09/2008    rt wrist removal of scar tissue    SURGICAL HISTORY OF -   12/01/2009    Arthroscopic subacromial decompression with introduction of On-Q pain pump.      Family History   Problem Relation Age of Onset    C.A.D. Maternal Grandmother     Arthritis Maternal Grandmother         rheumatoid arthritis    Diabetes Maternal Grandmother     Cardiovascular Maternal Grandfather     Coronary Artery Disease Maternal Grandfather     Cardiovascular Paternal Grandmother     Cardiovascular Paternal Grandfather     Respiratory Daughter         ASTHMA    Neurologic Disorder Father         FIBROMYALGIA    Heart Disease Father 68    C.A.D. Father 68        MI    Osteoporosis Father     Neurologic Disorder Brother         FIBROMYALGIA    Neurologic Disorder Sister         FIBROMYALGIA    Anxiety Disorder Sister     Thyroid Disease Mother     Hypertension Mother     Hyperlipidemia Mother     Arthritis Maternal Aunt         psoriatic    Diabetes Other         Maternal Aunt    Coronary Artery Disease Other         Maternal Aunt    Hypertension Other         Maternal Aunt    Hyperlipidemia Other         Maternal Aunt    Melanoma No family hx of        Objective  Wt 65.8 kg (145 lb)   BMI 24.32 kg/m      General: healthy, alert and in no distress    HEENT: no scleral icterus or conjunctival erythema   Skin: no suspicious lesions or rash. No jaundice.   CV: regular rhythm by palpation, 2+ distal pulses, no pedal edema    Resp: normal respiratory effort without conversational dyspnea   Psych: normal mood and affect    Gait: nonantalgic, appropriate coordination and balance   Neuro: normal light touch sensory exam of the extremities. Motor strength as noted below     Left Shoulder exam    ROM:        Mildly decreased  terminal active and passive ROM with flexion > extension, abduction, internal and external rotation, also midrange pain with abduction and flexion actively    Tender:        Anterior and lateral shoulder       Anterior GH joint nontender    Non Tender:       remainder of shoulder       sternoclavicular joint       acromioclavicular joint       periscapular region    Strength:        abduction 4+/5       internal rotation 5/5       external rotation 4+/5       adduction 5/5    Impingement testing:       neg (-) Neer       Mildly painful Mata       painful empty can       neg (-) crossover       Minimal pain with crank    Stability testing:       neg (-) anterior glide       neg (-) sulcus sign    Skin:       no visible deformities       well perfused       capillary refill brisk    Sensation:        normal sensation over shoulder and upper extremity     Radiology  Recent Results (from the past 744 hour(s))   XR Shoulder Left G/E 3 Views    Narrative    XR SHOULDER LEFT G/E 3 VIEWS   5/17/2024 8:11 AM     HISTORY: Chronic left shoulder pain; Chronic left shoulder pain  COMPARISON: None.       Impression    IMPRESSION:     Views of the left shoulder are negative for acute fracture or  dislocation. No definite acromioclavicular glenohumeral joint space  narrowing. There is diffuse osseous demineralization.    ALTON PLEITEZ MD         SYSTEM ID:  [id number]       Assessment:  1. Acute pain of left shoulder    2. Rotator cuff tendinitis, left    3. Calcific tendinitis of left shoulder    4. Hx of shoulder surgery        Plan:  Discussed the assessment with the patient.  Follow up: prn based on short term clinical progress  Acute flare of RTC pain >> GH joint pain on exam today, flared from starting PT, mild underlying DJD noted on XR, inferior glenoid osteophyte  Somewhat different from last visit where GH joint pain and CSI to GH joint was very helpful  Calcifications noted overlying supraspinatus, focal pain in this  region consistent with flare of calcific tendinitis  PT recommended and available anytime specific to shoulder, declined for now, reviewed basic HEP and activity to avoid for spine PT  Could consider advanced imaging based on short term progress, defer for now, low suspicion for full-thickness RTC tear  US guided subacromial CSI today, reviewed diagnostic and therapeutic goals as well as relative risks and limitations  XR images independently visualized and reviewed with patient today in clinic  Oral Tylenol and topical Voltaren gel reviewed as safe OTC options, reviewed safe dosing strategies  Expectations and goals of CSI reviewed  Often 2-3 days for steroid effect, and can take up to two weeks for maximum effect  We discussed modified progressive pain-free activity as tolerated  Do not overuse in first two weeks if feeling better due to concern for vulnerability while steroid is working  Supportive care reviewed  All questions were answered today  Contact us with additional questions or concerns  Signs and sx of concern reviewed      Jorge Lacy DO, AMBREEN  Sports Medicine Physician  [facility]          Disclaimer: This note consists of symbols derived from keyboarding, dictation and/or voice recognition software. As a result, there may be errors in the script that have gone undetected. Please consider this when interpreting information found in this chart.    Large Joint Injection/Arthocentesis: L subacromial bursa    Date/Time: 2/7/2025 3:28 PM    Performed by: Jorge Lacy DO  Authorized by: Jorge Lacy DO    Indications:  Pain  Needle Size:  25 G  Guidance: landmark guided    Approach:  Posterolateral  Location:  Shoulder      Site:  L subacromial bursa  Medications:  40 mg triamcinolone 40 MG/ML; 3 mL ROPivacaine 5 MG/ML  Outcome:  Tolerated well, no immediate complications  Procedure discussed: discussed risks, benefits, and alternatives    Consent  Given by:  Patient  Timeout: timeout called immediately prior to procedure    Prep: patient was prepped and draped in usual sterile fashion     Ultrasound images of procedure were permanently stored.

## 2025-02-18 RX ORDER — ROPIVACAINE HYDROCHLORIDE 5 MG/ML
3 INJECTION, SOLUTION EPIDURAL; INFILTRATION; PERINEURAL
Status: COMPLETED | OUTPATIENT
Start: 2025-02-07 | End: 2025-02-07

## 2025-02-18 RX ORDER — TRIAMCINOLONE ACETONIDE 40 MG/ML
40 INJECTION, SUSPENSION INTRA-ARTICULAR; INTRAMUSCULAR
Status: COMPLETED | OUTPATIENT
Start: 2025-02-07 | End: 2025-02-07

## 2025-02-27 ENCOUNTER — ALLIED HEALTH/NURSE VISIT (OUTPATIENT)
Dept: FAMILY MEDICINE | Facility: CLINIC | Age: 60
End: 2025-02-27
Payer: COMMERCIAL

## 2025-02-27 VITALS — TEMPERATURE: 97.8 F

## 2025-02-27 DIAGNOSIS — Z23 IMMUNIZATION DUE: Primary | ICD-10-CM

## 2025-02-27 NOTE — PROGRESS NOTES
Prior to immunization administration, verified patients identity using patient s name and date of birth. Please see Immunization Activity for additional information.     Screening Questionnaire for Adult Immunization    Are you sick today?   No   Do you have allergies to medications, food, a vaccine component or latex?   No   Have you ever had a serious reaction after receiving a vaccination?   No   Do you have a long-term health problem with heart, lung, kidney, or metabolic disease (e.g., diabetes), asthma, a blood disorder, no spleen, complement component deficiency, a cochlear implant, or a spinal fluid leak?  Are you on long-term aspirin therapy?   No   Do you have cancer, leukemia, HIV/AIDS, or any other immune system problem?   No   Do you have a parent, brother, or sister with an immune system problem?   No   In the past 3 months, have you taken medications that affect  your immune system, such as prednisone, other steroids, or anticancer drugs; drugs for the treatment of rheumatoid arthritis, Crohn s disease, or psoriasis; or have you had radiation treatments?   No   Have you had a seizure, or a brain or other nervous system problem?   No   During the past year, have you received a transfusion of blood or blood    products, or been given immune (gamma) globulin or antiviral drug?   No   For women: Are you pregnant or is there a chance you could become       pregnant during the next month?   No   Have you received any vaccinations in the past 4 weeks?   No     Immunization questionnaire answers were all negative.    I have reviewed the following standing orders:   This patient is due and qualifies for the Hepatitis B vaccine.    Click here for Hepatitis B Standing Order    I have reviewed the vaccines inclusion and exclusion criteria; No concerns regarding eligibility.     Patient instructed to remain in clinic for 15 minutes afterwards, and to report any adverse reactions.     Screening performed by Anahi  RAFAEL Montemayor, CMA on 2/27/2025 at 3:08 PM.

## 2025-03-02 ENCOUNTER — MYC MEDICAL ADVICE (OUTPATIENT)
Dept: FAMILY MEDICINE | Facility: CLINIC | Age: 60
End: 2025-03-02

## 2025-03-02 DIAGNOSIS — F51.01 PRIMARY INSOMNIA: Primary | ICD-10-CM

## 2025-03-03 RX ORDER — TEMAZEPAM 15 MG/1
15 CAPSULE ORAL
Qty: 30 CAPSULE | Refills: 5 | Status: SHIPPED | OUTPATIENT
Start: 2025-03-03

## 2025-03-22 ENCOUNTER — HEALTH MAINTENANCE LETTER (OUTPATIENT)
Age: 60
End: 2025-03-22

## 2025-04-04 ENCOUNTER — MYC MEDICAL ADVICE (OUTPATIENT)
Dept: FAMILY MEDICINE | Facility: CLINIC | Age: 60
End: 2025-04-04

## 2025-04-04 NOTE — TELEPHONE ENCOUNTER
See My chart message.     Pt asking if Dr. Haley can prescribe Emgality for her in the future?    Please advise. Kathleen Felder RN

## 2025-04-16 ENCOUNTER — HOSPITAL ENCOUNTER (OUTPATIENT)
Dept: MRI IMAGING | Facility: CLINIC | Age: 60
Discharge: HOME | End: 2025-04-16
Attending: FAMILY MEDICINE
Payer: COMMERCIAL

## 2025-04-16 DIAGNOSIS — M75.82 ROTATOR CUFF TENDINITIS, LEFT: ICD-10-CM

## 2025-04-16 DIAGNOSIS — M25.512 ACUTE PAIN OF LEFT SHOULDER: ICD-10-CM

## 2025-04-16 DIAGNOSIS — M75.32 CALCIFIC TENDINITIS OF LEFT SHOULDER: ICD-10-CM

## 2025-04-16 DIAGNOSIS — R29.898 WEAKNESS OF LEFT SHOULDER: ICD-10-CM

## 2025-04-16 DIAGNOSIS — Z98.890 HX OF SHOULDER SURGERY: ICD-10-CM

## 2025-04-16 PROCEDURE — 73221 MRI JOINT UPR EXTREM W/O DYE: CPT | Mod: 26 | Performed by: RADIOLOGY

## 2025-04-16 PROCEDURE — 73221 MRI JOINT UPR EXTREM W/O DYE: CPT | Mod: LT

## 2025-04-18 ENCOUNTER — TELEPHONE (OUTPATIENT)
Dept: ORTHOPEDICS | Facility: CLINIC | Age: 60
End: 2025-04-18
Payer: COMMERCIAL

## 2025-04-18 NOTE — TELEPHONE ENCOUNTER
RICKIE for return call to discuss MRI results.    Results message was sent to patient via [employer].    Torey Gunn ATC

## 2025-04-18 NOTE — TELEPHONE ENCOUNTER
Please reach out to Margot with MRI results.  Overall reassuring that there is no large or retracted RTC tear.  Small intrasubstance tear present is not usually a surgical issue, but given her prior hx of surgery and ongoing sx despite injection, can offr PT as previously discussed, vs referral to shoulder subspecialist for their opinion options if pain remains significant despite conservative care.    No major arthritis in the shoulder which is also reassuring, noted that there may be a posterior labral tear which could be contributing to some of her joint pain.  These tears are not generally surgical, especially in a nondominant arm, although discussion with a surgeon is reasonable overall, as noted above.    If she would like to consider injection in another location, or repeat TPI, we could try this anytime.  While the imaging does not suggest an obvious new place to inject, we would plan to follow up in clinic and reevaluate and make a decision from there is she is interested in trying a different location or returning to the same location.  Ideally we would not go back to the same location before May 7, which is 3 months after her last injection around the RTC.      Jorge Lacy DO, CAQ  Sports Medicine Physician  [facility]

## 2025-04-21 ENCOUNTER — MYC REFILL (OUTPATIENT)
Dept: OTOLARYNGOLOGY | Facility: CLINIC | Age: 60
End: 2025-04-21
Payer: COMMERCIAL

## 2025-04-21 ENCOUNTER — MYC MEDICAL ADVICE (OUTPATIENT)
Dept: ORTHOPEDICS | Facility: CLINIC | Age: 60
End: 2025-04-21
Payer: COMMERCIAL

## 2025-04-21 DIAGNOSIS — M25.512 ACUTE PAIN OF LEFT SHOULDER: ICD-10-CM

## 2025-04-21 DIAGNOSIS — Z98.890 HX OF SHOULDER SURGERY: ICD-10-CM

## 2025-04-21 DIAGNOSIS — M75.82 ROTATOR CUFF TENDINITIS, LEFT: Primary | ICD-10-CM

## 2025-04-21 DIAGNOSIS — Z86.69 HISTORY OF MIGRAINE HEADACHES: ICD-10-CM

## 2025-04-21 DIAGNOSIS — R49.0 HOARSENESS: ICD-10-CM

## 2025-04-21 DIAGNOSIS — R51.9 RIGHT SIDED FACIAL PAIN: ICD-10-CM

## 2025-04-21 DIAGNOSIS — K21.9 LARYNGOPHARYNGEAL REFLUX: ICD-10-CM

## 2025-04-21 DIAGNOSIS — R51.9 SINUS HEADACHE: ICD-10-CM

## 2025-04-21 RX ORDER — FAMOTIDINE 40 MG/1
40 TABLET, FILM COATED ORAL AT BEDTIME
Qty: 90 TABLET | Refills: 3 | OUTPATIENT
Start: 2025-04-21

## 2025-04-21 NOTE — TELEPHONE ENCOUNTER
Refused Prescriptions:                       Disp   Refills    famotidine (PEPCID) 40 MG tablet           90 tab*3        Sig: Take 1 tablet (40 mg) by mouth at bedtime.  Refused By: JENNA GARCIA  Reason for Refusal: Patient needs appointment    Jenna Garcia RN Care Coordinator, [facility] 04/21/25 11:03 AM

## 2025-05-12 ENCOUNTER — MYC MEDICAL ADVICE (OUTPATIENT)
Dept: FAMILY MEDICINE | Facility: CLINIC | Age: 60
End: 2025-05-12
Payer: COMMERCIAL

## 2025-05-12 DIAGNOSIS — E11.9 TYPE 2 DIABETES MELLITUS WITHOUT COMPLICATION, WITHOUT LONG-TERM CURRENT USE OF INSULIN (H): ICD-10-CM

## 2025-05-12 DIAGNOSIS — R49.0 HOARSENESS: ICD-10-CM

## 2025-05-12 DIAGNOSIS — Z86.69 HISTORY OF MIGRAINE HEADACHES: ICD-10-CM

## 2025-05-12 DIAGNOSIS — R51.9 SINUS HEADACHE: ICD-10-CM

## 2025-05-12 DIAGNOSIS — K21.9 LARYNGOPHARYNGEAL REFLUX: ICD-10-CM

## 2025-05-12 DIAGNOSIS — R51.9 RIGHT SIDED FACIAL PAIN: ICD-10-CM

## 2025-05-12 RX ORDER — TIRZEPATIDE 5 MG/.5ML
INJECTION, SOLUTION SUBCUTANEOUS
Qty: 2 ML | Refills: 5 | OUTPATIENT
Start: 2025-05-12

## 2025-05-12 RX ORDER — TIRZEPATIDE 5 MG/.5ML
5 INJECTION, SOLUTION SUBCUTANEOUS WEEKLY
Qty: 2 ML | Refills: 2 | Status: SHIPPED | OUTPATIENT
Start: 2025-05-12

## 2025-05-12 RX ORDER — FAMOTIDINE 40 MG/1
40 TABLET, FILM COATED ORAL AT BEDTIME
Qty: 90 TABLET | Refills: 0 | Status: SHIPPED | OUTPATIENT
Start: 2025-05-12

## 2025-05-21 ENCOUNTER — TELEPHONE (OUTPATIENT)
Dept: FAMILY MEDICINE | Facility: CLINIC | Age: 60
End: 2025-05-21
Payer: COMMERCIAL

## 2025-05-21 NOTE — TELEPHONE ENCOUNTER
Patient Quality Outreach    Patient is due for the following:   Breast Cancer Screening - Mammogram    Action(s) Taken:   Patient has upcoming appointment, these items will be addressed at that time.    Type of outreach:    Made notes on chart     Questions for provider review:    None         Marcella Leung, [facility]  Chart routed to None.

## 2025-05-29 ASSESSMENT — ACTIVITIES OF DAILY LIVING (ADL)
AT_ITS_WORST?: 10
WASHING_YOUR_HAIR?: 5
TOUCHING_THE_BACK_OF_YOUR_NECK: 5
REMOVING_SOMETHING_FROM_YOUR_BACK_POCKET: 5
REACHING_FOR_SOMETHING_ON_A_HIGH_SHELF: 10
PUSHING_WITH_THE_INVOLVED_ARM: 10
PUTTING_ON_A_SHIRT_THAT_BUTTONS_DOWN_THE_FRONT: 0
PUTTING_ON_YOUR_PANTS: 0
PLACING_AN_OBJECT_ON_A_HIGH_SHELF: 10
WHEN_LYING_ON_THE_INVOLVED_SIDE: 5
PLEASE_INDICATE_YOR_PRIMARY_REASON_FOR_REFERRAL_TO_THERAPY:: SHOULDER
WASHING_YOUR_BACK: 10
CARRYING_A_HEAVY_OBJECT_OF_10_POUNDS: 5
PUTTING_ON_AN_UNDERSHIRT_OR_A_PULLOVER_SWEATER: 5

## 2025-06-02 ENCOUNTER — THERAPY VISIT (OUTPATIENT)
Dept: PHYSICAL THERAPY | Facility: CLINIC | Age: 60
End: 2025-06-02
Attending: FAMILY MEDICINE
Payer: COMMERCIAL

## 2025-06-02 DIAGNOSIS — M75.82 ROTATOR CUFF TENDINITIS, LEFT: ICD-10-CM

## 2025-06-02 DIAGNOSIS — Z98.890 HX OF SHOULDER SURGERY: ICD-10-CM

## 2025-06-02 DIAGNOSIS — M25.512 ACUTE PAIN OF LEFT SHOULDER: ICD-10-CM

## 2025-06-02 PROCEDURE — 97110 THERAPEUTIC EXERCISES: CPT | Mod: GP | Performed by: PHYSICAL MEDICINE & REHABILITATION

## 2025-06-02 PROCEDURE — 97161 PT EVAL LOW COMPLEX 20 MIN: CPT | Mod: GP | Performed by: PHYSICAL MEDICINE & REHABILITATION

## 2025-06-02 NOTE — PROGRESS NOTES
PHYSICAL THERAPY EVALUATION  Type of Visit: Evaluation       Fall Risk Screen:  Have you fallen 2 or more times in the past year?: Yes  Have you fallen and had an injury in the past year?: No  Is patient receiving Physical Therapy Services?: No  Fall screen comments: pt demonstrates safe gait mechanics and radha during evaluation today therefore not considered fall risk    Subjective   Pt arrived to PT today for L shoulder pain that started/worsened after starting PT for thoracic pain 6 months ago. Notes she did get injection into shoulder yesterday. Shared feels slightly better already but still has some residual sx. Shared undiagnosed EDS, noting many family members have EDS and feels she has similar sx.   Presenting condition or subjective complaint: Pain in left shoulder  Date of onset: 04/25/25 (date of order, chronic condition)    Relevant medical history: Arthritis; Bladder or bowel problems; Diabetes; Dizziness; Fibromyalgia; Hearing problems; Menopause; Migraines or headaches; Non-healing wounds; Numbness or tingling in perianal area; Osteoarthritis; Overweight; Pain at night or rest; Severe headaches; Sleep disorder like apnea; Smoking; Vision problems   Past Medical History:   Diagnosis Date    Arthritis     Esophageal reflux     GERD    Migraine, unspecified, without mention of intractable migraine without mention of status migrainosus     Mitral valve disorders(424.0)     thought possible MVP, not documented    Myalgia and myositis, unspecified     Fibromyalgia    Type 2 diabetes mellitus without complication, without long-term current use of insulin (H) 5/17/2024     Dates & types of surgery: Honestly, too many to list. Significant to this appointmen, left clavical resection.    Past Surgical History:   Procedure Laterality Date    ABDOMEN SURGERY  01/01/2005    Right Sajniv-Collectomy    APPENDECTOMY  01/01/2005    With collectomy    COLONOSCOPY      COLONOSCOPY  06/11/2012    Procedure: COLONOSCOPY;   Colonoscopy;  Surgeon: Jun Reddy MD;  Location: [facility]    COLONOSCOPY N/A 02/15/2016    Procedure: COLONOSCOPY;  Surgeon: Anson Sethi MD;  Location: [facility]    ENT SURGERY      ENT SURGERY  2000    Sinus    ESOPHAGOSCOPY, GASTROSCOPY, DUODENOSCOPY (EGD), COMBINED N/A 10/08/2015    Procedure: COMBINED ESOPHAGOSCOPY, GASTROSCOPY, DUODENOSCOPY (EGD), BIOPSY SINGLE OR MULTIPLE;  Surgeon: Anson Sethi MD;  Location: [facility]    EYE SURGERY  2015    Recurrent corneal erosion    HYSTERECTOMY, VAGINAL  2002    LAPAROSCOPIC CHOLECYSTECTOMY N/A 10/27/2015    Procedure: LAPAROSCOPIC CHOLECYSTECTOMY;  Surgeon: Anson Sethi MD;  Location: [facility]    ORTHOPEDIC SURGERY      ORTHOPEDIC SURGERY      Left shoulder/Right Shoulder/Right Wrist x 3    SURGICAL HISTORY OF -   ,,        SURGICAL HISTORY OF -   1993    Excision (L) ganglion cyst    SURGICAL HISTORY OF -   1993    Tubal ligation    SURGICAL HISTORY OF -   1998    Septoplasty/sinus surgery    SURGICAL HISTORY OF -   1999    TVH    SURGICAL HISTORY OF -   2004/ /15/2005    Colonoscopy    SURGICAL HISTORY OF -   2001    Gastroscopy    SURGICAL HISTORY OF -   1998    Septoplasty    SURGICAL HISTORY OF -   2005    rt craig colectomy    SURGICAL HISTORY OF -   2005    Open distal clavicle resection with subacromial decompression    SURGICAL HISTORY OF -   2008    rt wrist TFCC    SURGICAL HISTORY OF -   2008    rt wrist removal of scar tissue    SURGICAL HISTORY OF -   2009    Arthroscopic subacromial decompression with introduction of On-Q pain pump.      Prior diagnostic imaging/testing results: MRI; X-ray     Prior therapy history for the same diagnosis, illness or injury: No      Prior Level of Function  Transfers: Independent  Ambulation: Independent  ADL: Independent    Living Environment  Social support: With a significant other or  spouse   Type of home: House   Stairs to enter the home: Yes 6 Is there a railing: Yes     Ramp: No   Stairs inside the home: Yes 11 Is there a railing: Yes     Help at home: Home management tasks (cooking, cleaning); Home and Yard maintenance tasks  Equipment owned: Grab bars; Bath bench     Employment: Yes Office and   Hobbies/Interests: Outsdoors: camping, boating, fishing. Indoors: reading, coloring.    Patient goals for therapy: Use my arm in general    Pain assessment: See objective evaluation for additional pain details     Objective   SHOULDER EVALUATION  PAIN: Pain Location: shoulder  Pain Quality: pinching and pulling  INTEGUMENTARY (edema, incisions): WNL  POSTURE: Sitting Posture: Rounded shoulders  GAIT:   Weightbearing Status: WBAT  Assistive Device(s): None  Gait Deviations: WNL  AROM: flexion: 130* (pain), abd: 60* (pain), ER: T1/2 (pain and feels stuck), IR: T7/8 (pain). Notes pain from mid humerus to neck. Shared pain feels pulling and pinching with AROM.   PROM: flexion: 130* (pain and muscle guarding), abd: 115* (pain and muscle guarding), ER at 90* abd: 70* (pain and muscle guarding), IR: WNL (pain at end range)  STRENGTH: flexion: 4/5, abd: 4-/5, ER: 4+/5, IR: 5-/5. Notes pain with all motions but IR.   FLEXIBILITY: limited pec minor  SPECIAL TESTS: NT due to pain. Will assess PRN and as tolerated  PALPATION: tenderness with mod palpation of supraspinatus, infraspinatus, upper traps  JOINT MOBILITY: NT due to pain. Will assess at upcoming appts as tolerated  CERVICAL SCREEN: WNL    Assessment & Plan   CLINICAL IMPRESSIONS  Medical Diagnosis: Rotator cuff tendinitis, left (M75.82), Hx of shoulder surgery (Z98.890), Acute pain of left shoulder    Treatment Diagnosis: L shoulder pain   Impression/Assessment: Patient is a 59 year old female with L shoulder pain complaints.  The following significant findings have been identified: Pain, Decreased ROM/flexibility, Decreased joint  mobility, Decreased strength, Impaired muscle performance, Decreased activity tolerance, and Impaired posture. These impairments interfere with their ability to perform self care tasks, recreational activities, and household chores as compared to previous level of function.     Clinical Decision Making (Complexity):  Clinical Presentation: Evolving/Changing  Clinical Presentation Rationale: based on medical and personal factors listed in PT evaluation  Clinical Decision Making (Complexity): Moderate complexity    PLAN OF CARE  Treatment Interventions:  Modalities: Cryotherapy, Cupping, Dry Needling, E-stim, Hot Pack, Mechanical Traction, Ultrasound, TENS  Interventions: Manual Therapy, Neuromuscular Re-education, Therapeutic Activity, Therapeutic Exercise, Self-Care/Home Management    Long Term Goals     PT Goal 1  Goal Identifier: 1  Goal Description: Pt will be able to demonstrate 150* shoulder flexion in order to decrease difficulty with reaching overhead  Target Date: 06/30/25  PT Goal 2  Goal Identifier: 2  Goal Description: Pt will be able to demonstrate 5-/5 global L shoulder strength in order to decrease difficulty with rubbing dogs belly  Target Date: 07/14/25  PT Goal 3  Goal Identifier: 3  Goal Description: Pt will be able to don/doff shirt with less than 3/10 pain in order to decrease difficulty with dressing  Target Date: 07/28/25  PT Goal 4  Goal Identifier: 4  Goal Description: Pt will be independent with HEP in order to self manage sx  Target Date: 07/28/25      Frequency of Treatment: 1x/week  Duration of Treatment: 8 weeks    Recommended Referrals to Other Professionals: none  Education Assessment:   Learner/Method: Patient;Listening;Reading;Demonstration;Pictures/Video;No Barriers to Learning  Education Comments: pt demonstrated and verbalized good understanding    Risks and benefits of evaluation/treatment have been explained.   Patient/Family/caregiver agrees with Plan of Care.     Evaluation  Time:     PT Sherice, Low Complexity Minutes (46068): 26   Present: Not applicable     Signing Clinician: Viviana Hamm PT    Please contact me with any questions or concerns.    Thank you for your referral,     Viviana Hamm PT, DPT  Physical Therapist  [address] [address] [phone]       carried

## 2025-06-19 SDOH — HEALTH STABILITY: PHYSICAL HEALTH: ON AVERAGE, HOW MANY MINUTES DO YOU ENGAGE IN EXERCISE AT THIS LEVEL?: 0 MIN

## 2025-06-19 SDOH — HEALTH STABILITY: PHYSICAL HEALTH: ON AVERAGE, HOW MANY DAYS PER WEEK DO YOU ENGAGE IN MODERATE TO STRENUOUS EXERCISE (LIKE A BRISK WALK)?: 0 DAYS

## 2025-06-19 ASSESSMENT — SOCIAL DETERMINANTS OF HEALTH (SDOH): HOW OFTEN DO YOU GET TOGETHER WITH FRIENDS OR RELATIVES?: TWICE A WEEK

## 2025-06-24 ENCOUNTER — RESULTS FOLLOW-UP (OUTPATIENT)
Dept: FAMILY MEDICINE | Facility: CLINIC | Age: 60
End: 2025-06-24

## 2025-06-24 ENCOUNTER — OFFICE VISIT (OUTPATIENT)
Dept: FAMILY MEDICINE | Facility: CLINIC | Age: 60
End: 2025-06-24
Attending: FAMILY MEDICINE
Payer: COMMERCIAL

## 2025-06-24 VITALS
SYSTOLIC BLOOD PRESSURE: 100 MMHG | HEIGHT: 65 IN | TEMPERATURE: 97.4 F | WEIGHT: 132 LBS | DIASTOLIC BLOOD PRESSURE: 70 MMHG | HEART RATE: 70 BPM | OXYGEN SATURATION: 99 % | RESPIRATION RATE: 12 BRPM | BODY MASS INDEX: 21.99 KG/M2

## 2025-06-24 DIAGNOSIS — K21.9 LARYNGOPHARYNGEAL REFLUX: ICD-10-CM

## 2025-06-24 DIAGNOSIS — Q79.60 EDS (EHLERS-DANLOS SYNDROME): ICD-10-CM

## 2025-06-24 DIAGNOSIS — Z86.69 HISTORY OF MIGRAINE HEADACHES: ICD-10-CM

## 2025-06-24 DIAGNOSIS — Z00.00 ROUTINE GENERAL MEDICAL EXAMINATION AT A HEALTH CARE FACILITY: ICD-10-CM

## 2025-06-24 DIAGNOSIS — E11.40 TYPE 2 DIABETES MELLITUS WITH DIABETIC NEUROPATHY, WITHOUT LONG-TERM CURRENT USE OF INSULIN (H): ICD-10-CM

## 2025-06-24 DIAGNOSIS — Z12.31 VISIT FOR SCREENING MAMMOGRAM: Primary | ICD-10-CM

## 2025-06-24 DIAGNOSIS — F41.9 ANXIETY: ICD-10-CM

## 2025-06-24 DIAGNOSIS — E11.9 TYPE 2 DIABETES MELLITUS WITHOUT COMPLICATION, WITHOUT LONG-TERM CURRENT USE OF INSULIN (H): ICD-10-CM

## 2025-06-24 DIAGNOSIS — F51.01 PRIMARY INSOMNIA: ICD-10-CM

## 2025-06-24 LAB
ALBUMIN SERPL BCG-MCNC: 4.4 G/DL (ref 3.5–5.2)
ALP SERPL-CCNC: 39 U/L (ref 40–150)
ALT SERPL W P-5'-P-CCNC: 18 U/L (ref 0–50)
ANION GAP SERPL CALCULATED.3IONS-SCNC: 12 MMOL/L (ref 7–15)
AST SERPL W P-5'-P-CCNC: 23 U/L (ref 0–45)
BILIRUB SERPL-MCNC: 0.8 MG/DL
BUN SERPL-MCNC: 13.9 MG/DL (ref 6–20)
CALCIUM SERPL-MCNC: 10.2 MG/DL (ref 8.8–10.4)
CHLORIDE SERPL-SCNC: 106 MMOL/L (ref 98–107)
CHOLEST SERPL-MCNC: 216 MG/DL
CREAT SERPL-MCNC: 0.73 MG/DL (ref 0.51–0.95)
CREAT UR-MCNC: 189 MG/DL
EGFRCR SERPLBLD CKD-EPI 2021: >90 ML/MIN/1.73M2
ERYTHROCYTE [DISTWIDTH] IN BLOOD BY AUTOMATED COUNT: 12.7 % (ref 10–15)
EST. AVERAGE GLUCOSE BLD GHB EST-MCNC: 105 MG/DL
FASTING STATUS PATIENT QL REPORTED: YES
FASTING STATUS PATIENT QL REPORTED: YES
GLUCOSE SERPL-MCNC: 99 MG/DL (ref 70–99)
HBA1C MFR BLD: 5.3 % (ref 0–5.6)
HCO3 SERPL-SCNC: 24 MMOL/L (ref 22–29)
HCT VFR BLD AUTO: 42.3 % (ref 35–47)
HDLC SERPL-MCNC: 42 MG/DL
HGB BLD-MCNC: 13.8 G/DL (ref 11.7–15.7)
LDLC SERPL CALC-MCNC: 150 MG/DL
MCH RBC QN AUTO: 29.4 PG (ref 26.5–33)
MCHC RBC AUTO-ENTMCNC: 32.6 G/DL (ref 31.5–36.5)
MCV RBC AUTO: 90 FL (ref 78–100)
MICROALBUMIN UR-MCNC: 16.7 MG/L
MICROALBUMIN/CREAT UR: 8.84 MG/G CR (ref 0–25)
NONHDLC SERPL-MCNC: 174 MG/DL
PLATELET # BLD AUTO: 236 10E3/UL (ref 150–450)
POTASSIUM SERPL-SCNC: 3.9 MMOL/L (ref 3.4–5.3)
PROT SERPL-MCNC: 7.4 G/DL (ref 6.4–8.3)
RBC # BLD AUTO: 4.7 10E6/UL (ref 3.8–5.2)
SODIUM SERPL-SCNC: 142 MMOL/L (ref 135–145)
TRIGL SERPL-MCNC: 122 MG/DL
WBC # BLD AUTO: 5.2 10E3/UL (ref 4–11)

## 2025-06-24 PROCEDURE — 83036 HEMOGLOBIN GLYCOSYLATED A1C: CPT | Performed by: FAMILY MEDICINE

## 2025-06-24 PROCEDURE — 99396 PREV VISIT EST AGE 40-64: CPT | Performed by: FAMILY MEDICINE

## 2025-06-24 PROCEDURE — 36415 COLL VENOUS BLD VENIPUNCTURE: CPT | Performed by: FAMILY MEDICINE

## 2025-06-24 PROCEDURE — 80053 COMPREHEN METABOLIC PANEL: CPT | Performed by: FAMILY MEDICINE

## 2025-06-24 PROCEDURE — 3078F DIAST BP <80 MM HG: CPT | Performed by: FAMILY MEDICINE

## 2025-06-24 PROCEDURE — 82570 ASSAY OF URINE CREATININE: CPT | Performed by: FAMILY MEDICINE

## 2025-06-24 PROCEDURE — 82043 UR ALBUMIN QUANTITATIVE: CPT | Performed by: FAMILY MEDICINE

## 2025-06-24 PROCEDURE — 99214 OFFICE O/P EST MOD 30 MIN: CPT | Mod: 25 | Performed by: FAMILY MEDICINE

## 2025-06-24 PROCEDURE — 3074F SYST BP LT 130 MM HG: CPT | Performed by: FAMILY MEDICINE

## 2025-06-24 PROCEDURE — 1126F AMNT PAIN NOTED NONE PRSNT: CPT | Performed by: FAMILY MEDICINE

## 2025-06-24 PROCEDURE — 85027 COMPLETE CBC AUTOMATED: CPT | Performed by: FAMILY MEDICINE

## 2025-06-24 PROCEDURE — 80061 LIPID PANEL: CPT | Performed by: FAMILY MEDICINE

## 2025-06-24 RX ORDER — LORAZEPAM 0.5 MG/1
0.5 TABLET ORAL DAILY
Qty: 30 TABLET | Refills: 0 | Status: SHIPPED | OUTPATIENT
Start: 2025-06-24

## 2025-06-24 RX ORDER — FAMOTIDINE 40 MG/1
40 TABLET, FILM COATED ORAL AT BEDTIME
Qty: 90 TABLET | Refills: 3 | Status: SHIPPED | OUTPATIENT
Start: 2025-06-24

## 2025-06-24 RX ORDER — TEMAZEPAM 15 MG/1
15 CAPSULE ORAL
Qty: 30 CAPSULE | Refills: 5 | Status: SHIPPED | OUTPATIENT
Start: 2025-06-24

## 2025-06-24 ASSESSMENT — PAIN SCALES - GENERAL: PAINLEVEL_OUTOF10: NO PAIN (0)

## 2025-06-24 NOTE — PATIENT INSTRUCTIONS
Patient Education   Preventive Care Advice   This is general advice given by our system to help you stay healthy. However, your care team may have specific advice just for you. Please talk to your care team about your preventive care needs.  Nutrition  Eat 5 or more servings of fruits and vegetables each day.  Try wheat bread, brown rice and whole grain pasta (instead of white bread, rice, and pasta).  Get enough calcium and vitamin D. Check the label on foods and aim for 100% of the RDA (recommended daily allowance).  Lifestyle  Exercise at least 150 minutes each week  (30 minutes a day, 5 days a week).  Do muscle strengthening activities 2 days a week. These help control your weight and prevent disease.  No smoking.  Wear sunscreen to prevent skin cancer.  Have a dental exam and cleaning every 6 months.  Yearly exams  See your health care team every year to talk about:  Any changes in your health.  Any medicines your care team has prescribed.  Preventive care, family planning, and ways to prevent chronic diseases.  Shots (vaccines)   HPV shots (up to age 26), if you've never had them before.  Hepatitis B shots (up to age 59), if you've never had them before.  COVID-19 shot: Get this shot when it's due.  Flu shot: Get a flu shot every year.  Tetanus shot: Get a tetanus shot every 10 years.  Pneumococcal, hepatitis A, and RSV shots: Ask your care team if you need these based on your risk.  Shingles shot (for age 50 and up)  General health tests  Diabetes screening:  Starting at age 35, Get screened for diabetes at least every 3 years.  If you are younger than age 35, ask your care team if you should be screened for diabetes.  Cholesterol test: At age 39, start having a cholesterol test every 5 years, or more often if advised.  Bone density scan (DEXA): At age 50, ask your care team if you should have this scan for osteoporosis (brittle bones).  Hepatitis C: Get tested at least once in your life.  STIs (sexually  transmitted infections)  Before age 24: Ask your care team if you should be screened for STIs.  After age 24: Get screened for STIs if you're at risk. You are at risk for STIs (including HIV) if:  You are sexually active with more than one person.  You don't use condoms every time.  You or a partner was diagnosed with a sexually transmitted infection.  If you are at risk for HIV, ask about PrEP medicine to prevent HIV.  Get tested for HIV at least once in your life, whether you are at risk for HIV or not.  Cancer screening tests  Cervical cancer screening: If you have a cervix, begin getting regular cervical cancer screening tests starting at age 21.  Breast cancer scan (mammogram): If you've ever had breasts, begin having regular mammograms starting at age 40. This is a scan to check for breast cancer.  Colon cancer screening: It is important to start screening for colon cancer at age 45.  Have a colonoscopy test every 10 years (or more often if you're at risk) Or, ask your provider about stool tests like a FIT test every year or Cologuard test every 3 years.  To learn more about your testing options, visit:   .  For help making a decision, visit:   https://bit.ly/qk13580.  Prostate cancer screening test: If you have a prostate, ask your care team if a prostate cancer screening test (PSA) at age 55 is right for you.  Lung cancer screening: If you are a current or former smoker ages 50 to 80, ask your care team if ongoing lung cancer screenings are right for you.  For informational purposes only. Not to replace the advice of your health care provider. Copyright   2023 [facility]. All rights reserved. Clinically reviewed by the [facility] Transitions Program. Infor 720201 - REV 01/24.  Preventing Falls: Care Instructions  Injuries and health problems such as trouble walking or poor eyesight can increase your risk of falling. So can some medicines. But there are things you can do to help  "prevent falls. You can exercise to get stronger. You can also arrange your home to make it safer.    Talk to your doctor about the medicines you take. Ask if any of them increase the risk of falls and whether they can be changed or stopped.   Try to exercise regularly. It can help improve your strength and balance. This can help lower your risk of falling.         Practice fall safety and prevention.   Wear low-heeled shoes that fit well and give your feet good support. Talk to your doctor if you have foot problems that make this hard.  Carry a cellphone or wear a medical alert device that you can use to call for help.  Use stepladders instead of chairs to reach high objects. Don't climb if you're at risk for falls. Ask for help, if needed.  Wear the correct eyeglasses, if you need them.        Make your home safer.   Remove rugs, cords, clutter, and furniture from walkways.  Keep your house well lit. Use night-lights in hallways and bathrooms.  Install and use sturdy handrails on stairways.  Wear nonskid footwear, even inside. Don't walk barefoot or in socks without shoes.        Be safe outside.   Use handrails, curb cuts, and ramps whenever possible.  Keep your hands free by using a shoulder bag or backpack.  Try to walk in well-lit areas. Watch out for uneven ground, changes in pavement, and debris.  Be careful in the winter. Walk on the grass or gravel when sidewalks are slippery. Use de-icer on steps and walkways. Add non-slip devices to shoes.    Put grab bars and nonskid mats in your shower or tub and near the toilet. Try to use a shower chair or bath bench when bathing.   Get into a tub or shower by putting in your weaker leg first. Get out with your strong side first. Have a phone or medical alert device in the bathroom with you.   Where can you learn more?  Go to https://www.[employer].net/patiented  Enter G117 in the search box to learn more about \"Preventing Falls: Care Instructions.\"  Current as of: " July 31, 2024  Content Version: 14.5    2065-9436 [employer].   Care instructions adapted under license by your healthcare professional. If you have questions about a medical condition or this instruction, always ask your healthcare professional. [employer] disclaims any warranty or liability for your use of this information.

## 2025-06-24 NOTE — PROGRESS NOTES
"Date of Office Visit: 2018  Encounter Provider: ROSALIND Gillespie  Place of Service: [facility] CARDIOLOGY  Patient Name: Kaitlynn Guerrero  :Preventive Care Visit  [facility]  Lakeisha Haley MD, Family Medicine  Jun 24, 2025      Assessment & Plan     Visit for screening mammogram    - MA Screening Bilateral w/ Sarbjit; Future    Type 2 diabetes mellitus without complication, without long-term current use of insulin (H)  Adjust meds as indicated by above labs.   - HEMOGLOBIN A1C; Future  - Albumin Random Urine Quantitative with Creat Ratio; Future  - Lipid panel reflex to direct LDL Fasting; Future  - CBC with platelets; Future  - Comprehensive metabolic panel; Future  - dulaglutide (TRULICITY) 0.75 MG/0.5ML pen; Inject 0.75 mg subcutaneously every 7 days.  - Comprehensive metabolic panel  - CBC with platelets  - Lipid panel reflex to direct LDL Fasting  - Albumin Random Urine Quantitative with Creat Ratio  - HEMOGLOBIN A1C    History of migraine headaches  No longer seeing neuro   Migraines have been well controlled   - stable on emgality I will take over those refills     Laryngopharyngeal reflux  Stable no change in treatment plan.   - famotidine (PEPCID) 40 MG tablet; Take 1 tablet (40 mg) by mouth at bedtime.    Primary insomnia  Stable no change in treatment plan.   - temazepam (RESTORIL) 15 MG capsule; Take 1 capsule (15 mg) by mouth nightly as needed for sleep.    Type 2 diabetes mellitus with diabetic neuropathy, without long-term current use of insulin (H)      Anxiety  Used for dds and flying   - LORazepam (ATIVAN) 0.5 MG tablet; Take 1 tablet (0.5 mg) by mouth daily. For panic or for flying    EDS (Mendoza-Danlos syndrome)  Dxd via PT would like to see PT that specializes in EDS   - Physical Therapy  Referral; Future    Routine general medical examination at a health care facility      Patient has been advised of split billing requirements and indicates understanding: Yes    The longitudinal plan of care for the diagnosis(es)/condition(s) as documented were addressed during this visit. Due to the added  complexity in care, I will continue to support Margot in the subsequent management and with ongoing continuity of care.      Reviewed preventive health counseling, as reflected in patient instructions  Counseling  Appropriate preventive services were addressed with this patient via screening, questionnaire, or discussion as appropriate for fall prevention, nutrition, physical activity, Tobacco-use cessation, social engagement, weight loss and cognition.  Checklist reviewing preventive services available has been given to the patient.  Reviewed patient's diet, addressing concerns and/or questions.             Subjective   Margot is a 59 year old, presenting for the following:  Physical        6/24/2025     6:50 AM   Additional Questions   Roomed by Juany TREJO   Accompanied by Self         6/24/2025     6:50 AM   Patient Reported Additional Medications   Patient reports taking the following new medications .          HPI       Diabetes Follow-up    How often are you checking your blood sugar? Continuous glucose monitor  What time of day are you checking your blood sugars (select all that apply)?  Before and after meals  Have you had any blood sugars above 200?  No  Have you had any blood sugars below 70?  Yes occ. When sleeping and rolls on side of arms  What symptoms do you notice when your blood sugar is low?  None  What concerns do you have today about your diabetes? None   Do you have any of these symptoms? (Select all that apply)  No numbness or tingling in feet.  No redness, sores or blisters on feet.  No complaints of excessive thirst.  No reports of blurry vision.  No significant changes to weight.      BP Readings from Last 2 Encounters:   06/24/25 100/70   10/10/24 118/72     Hemoglobin A1C (%)   Date Value   12/14/2024 5.2   09/18/2024 5.4     LDL Cholesterol Calculated (mg/dL)   Date Value   12/14/2024 83   09/18/2024 126 (H)   05/12/2021 132 (H)   11/28/2016 132 (H)         Anxiety   How are you doing with your  anxiety since your last visit? No change  Are you having other symptoms that might be associated with anxiety? No  Have you had a significant life event? No   Are you feeling depressed? No  Do you have any concerns with your use of alcohol or other drugs? No    Social History     Tobacco Use    Smoking status: Former     Current packs/day: 0.00     Types: Cigarettes     Quit date: 2013     Years since quittin.9    Smokeless tobacco: Never   Vaping Use    Vaping status: Some Days    Substances: Nicotine   Substance Use Topics    Alcohol use: No    Drug use: No          No data to display                   No data to display                Migraine   Since your last clinic visit, how have your headaches changed?  No change  How often are you getting headaches or migraines? Rarely    Are you able to do normal daily activities when you have a migraine? Yes  Are you taking rescue/relief medications? (Select all that apply) ibuprofen (Advil, Motrin)  How helpful is your rescue/relief medication?  I get some relief  Are you taking any medications to prevent migraines? (Select all that apply)  Other: emgality   In the past 4 weeks, how often have you gone to urgent care or the emergency room because of your headaches?  0    Insomnia  Will controlled     EDS   Has been diagnosed by PT   Would like to work on strengthening  Has lost sig muscle mass on mounjaro       Advance Care Planning        Did not discuss         2025   General Health   How would you rate your overall physical health? (!) POOR   Feel stress (tense, anxious, or unable to sleep) Not at all         2025   Nutrition   Three or more servings of calcium each day? (!) NO   Diet: Diabetic   How many servings of fruit and vegetables per day? (!) 0-1   How many sweetened beverages each day? 0-1         2025   Exercise   Days per week of moderate/strenous exercise 0 days   Average minutes spent exercising at this level 0 min   (!) EXERCISE  "ELIAS VELEZ  Authorized by: ELIAS VELEZ   CONCERN      2025   Social Factors   Frequency of gathering with friends or relatives Twice a week   Worry food won't last until get money to buy more No   Food not last or not have enough money for food? No   Do you have housing? (Housing is defined as stable permanent housing and does not include staying outside in a car, in a tent, in an abandoned building, in an overnight shelter, or couch-surfing.) Yes   Are you worried about losing your housing? No   Lack of transportation? No   Unable to get utilities (heat,electricity)? No         2025   Fall Risk   Gait Speed Test (Document in seconds) 4   Gait Speed Test Interpretation Less than or equal to 5.00 seconds - PASS          2025   Dental   Dentist two times every year? Yes               2025   Substance Use   Alcohol more than 3/day or more than 7/wk Not Applicable   Do you use any other substances recreationally? No     Social History     Tobacco Use    Smoking status: Former     Current packs/day: 0.00     Types: Cigarettes     Quit date: 2013     Years since quittin.9    Smokeless tobacco: Never   Vaping Use    Vaping status: Some Days    Substances: Nicotine   Substance Use Topics    Alcohol use: No    Drug use: No           2022   LAST FHS-7 RESULTS   1st degree relative breast or ovarian cancer No   Any relative bilateral breast cancer No   Any male have breast cancer No   Any ONE woman have BOTH breast AND ovarian cancer No   Any woman with breast cancer before 50yrs No   2 or more relatives with breast AND/OR ovarian cancer No   2 or more relatives with breast AND/OR bowel cancer No        Mammogram Screening - Mammogram every 1-2 years updated in Health Maintenance based on mutual decision making        2025   STI Screening   New sexual partner(s) since last STI/HIV test? No     History of abnormal Pap smear: Status post hysterectomy with removal of cervix and no history of CIN2 or greater or cervical cancer.    • aspirin 81 MG chewable tablet Chew 81 mg Daily.     • cetirizine (ZYRTEC ALLERGY) 10 MG tablet Take 10 mg by mouth daily.     • cholecalciferol (VITAMIN D3) 1000 UNITS tablet Take 5,000 Units by mouth Daily.     • cyanocobalamin ([employer] VITAMIN B-12) 500 MCG tablet Take 500 mcg by mouth 2 (two) times a day.     • furosemide (LASIX) 40 MG tablet Take 1 tablet by mouth Daily. May take one tablet extra PRN daily 90 tablet 5   • HYDROcodone-acetaminophen (NORCO)  MG per tablet Take 1 tablet by mouth Every 6 (Six) Hours As Needed for Moderate Pain .     • isosorbide mononitrate (IMDUR) 30 MG 24 hr tablet Take 30 mg by mouth Daily.     • levothyroxine (SYNTHROID, LEVOTHROID) 100 MCG tablet Take 100 mcg by mouth Daily.     • losartan (COZAAR) 50 MG tablet Take 50 mg by mouth daily.     • metFORMIN (GLUCOPHAGE) 500 MG tablet Take 500 mg by mouth. One tablet in the morning. Two tablets in the evening.     • metoprolol succinate XL (TOPROL-XL) 100 MG 24 hr tablet Take 1 tablet by mouth Daily. 30 tablet 11   • Multiple Vitamins-Minerals (CENTRUM SILVER ADULT 50+) tablet Take 1 tablet by mouth daily.     • omeprazole (priLOSEC) 40 MG capsule Take 40 mg by mouth Daily.     • probiotic (CULTURELLE) capsule capsule Take 1 capsule by mouth daily.     • rOPINIRole (REQUIP) 1 MG tablet Take 1 mg by mouth As Needed.     • simethicone (GAS-X ULTRA STRENGTH) 180 MG capsule Take 180 mg by mouth as needed for flatulence.     • vitamin C (ASCORBIC ACID) 500 MG tablet Take 500 mg by mouth 2 (Two) Times a Day.     • nitroglycerin (NITROSTAT) 0.4 MG SL tablet 1 under the tongue as needed for angina, may repeat q5mins for up three doses 100 tablet 1     No current facility-administered medications for this visit.      Assessment:       Diagnosis Plan   1. Coronary artery disease involving native coronary artery of native heart without angina pectoris  furosemide (LASIX) 40 MG tablet   2. Other hyperlipidemia     3. Essential  "Health Maintenance and Surgical History updated.       ASCVD Risk   The 10-year ASCVD risk score (Medardo ARANDA, et al., 2019) is: 3%    Values used to calculate the score:      Age: 59 years      Sex: Female      Is Non- : No      Diabetic: Yes      Tobacco smoker: No      Systolic Blood Pressure: 100 mmHg      Is BP treated: No      HDL Cholesterol: 36 mg/dL      Total Cholesterol: 144 mg/dL           Reviewed and updated as needed this visit by Provider   Tobacco  Allergies  Meds  Problems  Med Hx  Surg Hx  Fam Hx                  Review of Systems  Constitutional, HEENT, cardiovascular, pulmonary, gi and gu systems are negative, except as otherwise noted.     Objective    Exam  /70   Pulse 70   Temp 97.4  F (36.3  C) (Tympanic)   Resp 12   Ht 1.657 m (5' 5.25\")   Wt 59.9 kg (132 lb)   SpO2 99%   BMI 21.80 kg/m     Estimated body mass index is 21.8 kg/m  as calculated from the following:    Height as of this encounter: 1.657 m (5' 5.25\").    Weight as of this encounter: 59.9 kg (132 lb).    Physical Exam  GENERAL: alert and no distress  EYES: Eyes grossly normal to inspection, PERRL and conjunctivae and sclerae normal  HENT: ear canals and TM's normal, nose and mouth without ulcers or lesions  NECK: no adenopathy, no asymmetry, masses, or scars  RESP: lungs clear to auscultation - no rales, rhonchi or wheezes  CV: regular rate and rhythm, normal S1 S2, no S3 or S4, no murmur, click or rub, no peripheral edema  ABDOMEN: soft, nontender, no hepatosplenomegaly, no masses and bowel sounds normal  MS: no gross musculoskeletal defects noted, no edema  SKIN: no suspicious lesions or rashes  NEURO: Normal strength and tone, mentation intact and speech normal  PSYCH: mentation appears normal, affect normal/bright        Signed Electronically by: Lakeisha Haley MD    " 

## 2025-06-24 NOTE — NURSING NOTE
"Chief Complaint   Patient presents with    Physical     /70   Pulse 70   Temp 97.4  F (36.3  C) (Tympanic)   Resp 12   Ht 1.657 m (5' 5.25\")   Wt 59.9 kg (132 lb)   SpO2 99%   BMI 21.80 kg/m   Estimated body mass index is 21.8 kg/m  as calculated from the following:    Height as of this encounter: 1.657 m (5' 5.25\").    Weight as of this encounter: 59.9 kg (132 lb).  Patient presents to the clinic using No DME      Health Maintenance that is potentially due pending provider review:    Health Maintenance Due   Topic Date Due    DIABETIC FOOT EXAM  Never done    MIGRAINE ACTION PLAN  Never done    LUNG CANCER SCREENING  09/14/2017    MAMMO SCREENING  06/20/2024    COVID-19 VACCINE (4 - 2024-25 season) 09/01/2024    A1C  03/14/2025    MICROALBUMIN  05/20/2025    BMP  06/21/2025    YEARLY PREVENTIVE VISIT  06/21/2025                  "

## 2025-06-25 ENCOUNTER — PATIENT OUTREACH (OUTPATIENT)
Dept: CARE COORDINATION | Facility: CLINIC | Age: 60
End: 2025-06-25

## 2025-08-12 ENCOUNTER — THERAPY VISIT (OUTPATIENT)
Dept: PHYSICAL THERAPY | Facility: REHABILITATION | Age: 60
End: 2025-08-12
Attending: FAMILY MEDICINE
Payer: COMMERCIAL

## 2025-08-12 DIAGNOSIS — M25.572 CHRONIC ANKLE PAIN, BILATERAL: ICD-10-CM

## 2025-08-12 DIAGNOSIS — G89.29 CHRONIC BILATERAL LOW BACK PAIN WITHOUT SCIATICA: ICD-10-CM

## 2025-08-12 DIAGNOSIS — G89.29 CHRONIC NECK PAIN: ICD-10-CM

## 2025-08-12 DIAGNOSIS — M54.2 CHRONIC NECK PAIN: ICD-10-CM

## 2025-08-12 DIAGNOSIS — M25.511 CHRONIC PAIN OF BOTH SHOULDERS: ICD-10-CM

## 2025-08-12 DIAGNOSIS — G89.29 CHRONIC PAIN OF BOTH SHOULDERS: ICD-10-CM

## 2025-08-12 DIAGNOSIS — G89.29 CHRONIC PAIN OF RIGHT HIP: ICD-10-CM

## 2025-08-12 DIAGNOSIS — M25.571 CHRONIC ANKLE PAIN, BILATERAL: ICD-10-CM

## 2025-08-12 DIAGNOSIS — Q79.60 EDS (EHLERS-DANLOS SYNDROME): Primary | ICD-10-CM

## 2025-08-12 DIAGNOSIS — M25.512 CHRONIC PAIN OF BOTH SHOULDERS: ICD-10-CM

## 2025-08-12 DIAGNOSIS — M54.50 CHRONIC BILATERAL LOW BACK PAIN WITHOUT SCIATICA: ICD-10-CM

## 2025-08-12 DIAGNOSIS — R07.81 RIB PAIN: ICD-10-CM

## 2025-08-12 DIAGNOSIS — G89.29 CHRONIC ANKLE PAIN, BILATERAL: ICD-10-CM

## 2025-08-12 DIAGNOSIS — M25.551 CHRONIC PAIN OF RIGHT HIP: ICD-10-CM

## 2025-08-12 PROCEDURE — 97162 PT EVAL MOD COMPLEX 30 MIN: CPT | Mod: GP | Performed by: PHYSICAL THERAPIST

## 2025-08-12 PROCEDURE — 97110 THERAPEUTIC EXERCISES: CPT | Mod: GP | Performed by: PHYSICAL THERAPIST

## 2025-08-20 ENCOUNTER — THERAPY VISIT (OUTPATIENT)
Dept: PHYSICAL THERAPY | Facility: REHABILITATION | Age: 60
End: 2025-08-20
Attending: FAMILY MEDICINE
Payer: COMMERCIAL

## 2025-08-20 DIAGNOSIS — G89.29 CHRONIC BILATERAL LOW BACK PAIN WITHOUT SCIATICA: ICD-10-CM

## 2025-08-20 DIAGNOSIS — M25.511 CHRONIC PAIN OF BOTH SHOULDERS: ICD-10-CM

## 2025-08-20 DIAGNOSIS — G89.29 CHRONIC NECK PAIN: ICD-10-CM

## 2025-08-20 DIAGNOSIS — R07.81 RIB PAIN: ICD-10-CM

## 2025-08-20 DIAGNOSIS — M25.572 CHRONIC ANKLE PAIN, BILATERAL: ICD-10-CM

## 2025-08-20 DIAGNOSIS — G89.29 CHRONIC ANKLE PAIN, BILATERAL: ICD-10-CM

## 2025-08-20 DIAGNOSIS — M54.2 CHRONIC NECK PAIN: ICD-10-CM

## 2025-08-20 DIAGNOSIS — M25.512 CHRONIC PAIN OF BOTH SHOULDERS: ICD-10-CM

## 2025-08-20 DIAGNOSIS — G89.29 CHRONIC PAIN OF BOTH SHOULDERS: ICD-10-CM

## 2025-08-20 DIAGNOSIS — M54.50 CHRONIC BILATERAL LOW BACK PAIN WITHOUT SCIATICA: ICD-10-CM

## 2025-08-20 DIAGNOSIS — G89.29 CHRONIC PAIN OF RIGHT HIP: ICD-10-CM

## 2025-08-20 DIAGNOSIS — Q79.60 EDS (EHLERS-DANLOS SYNDROME): Primary | ICD-10-CM

## 2025-08-20 DIAGNOSIS — M25.571 CHRONIC ANKLE PAIN, BILATERAL: ICD-10-CM

## 2025-08-20 DIAGNOSIS — M25.551 CHRONIC PAIN OF RIGHT HIP: ICD-10-CM

## 2025-08-20 PROCEDURE — 97110 THERAPEUTIC EXERCISES: CPT | Mod: GP | Performed by: PHYSICAL THERAPIST

## 2025-08-21 ENCOUNTER — E-VISIT (OUTPATIENT)
Dept: URGENT CARE | Facility: CLINIC | Age: 60
End: 2025-08-21
Payer: COMMERCIAL

## 2025-08-21 DIAGNOSIS — R30.0 DYSURIA: Primary | ICD-10-CM

## 2025-08-28 ENCOUNTER — THERAPY VISIT (OUTPATIENT)
Dept: PHYSICAL THERAPY | Facility: REHABILITATION | Age: 60
End: 2025-08-28
Attending: FAMILY MEDICINE
Payer: COMMERCIAL

## 2025-08-28 DIAGNOSIS — Q79.60 EDS (EHLERS-DANLOS SYNDROME): Primary | ICD-10-CM

## 2025-08-28 DIAGNOSIS — M54.2 CHRONIC NECK PAIN: ICD-10-CM

## 2025-08-28 DIAGNOSIS — M25.551 CHRONIC PAIN OF RIGHT HIP: ICD-10-CM

## 2025-08-28 DIAGNOSIS — G89.29 CHRONIC PAIN OF RIGHT HIP: ICD-10-CM

## 2025-08-28 DIAGNOSIS — M25.512 CHRONIC PAIN OF BOTH SHOULDERS: ICD-10-CM

## 2025-08-28 DIAGNOSIS — M54.50 CHRONIC BILATERAL LOW BACK PAIN WITHOUT SCIATICA: ICD-10-CM

## 2025-08-28 DIAGNOSIS — G89.29 CHRONIC PAIN OF BOTH SHOULDERS: ICD-10-CM

## 2025-08-28 DIAGNOSIS — M25.571 CHRONIC ANKLE PAIN, BILATERAL: ICD-10-CM

## 2025-08-28 DIAGNOSIS — G89.29 CHRONIC NECK PAIN: ICD-10-CM

## 2025-08-28 DIAGNOSIS — M25.572 CHRONIC ANKLE PAIN, BILATERAL: ICD-10-CM

## 2025-08-28 DIAGNOSIS — G89.29 CHRONIC ANKLE PAIN, BILATERAL: ICD-10-CM

## 2025-08-28 DIAGNOSIS — R07.81 RIB PAIN: ICD-10-CM

## 2025-08-28 DIAGNOSIS — G89.29 CHRONIC BILATERAL LOW BACK PAIN WITHOUT SCIATICA: ICD-10-CM

## 2025-08-28 DIAGNOSIS — M25.511 CHRONIC PAIN OF BOTH SHOULDERS: ICD-10-CM
